# Patient Record
Sex: FEMALE | Race: WHITE | NOT HISPANIC OR LATINO | Employment: OTHER | URBAN - METROPOLITAN AREA
[De-identification: names, ages, dates, MRNs, and addresses within clinical notes are randomized per-mention and may not be internally consistent; named-entity substitution may affect disease eponyms.]

---

## 2017-11-29 ENCOUNTER — HOSPITAL ENCOUNTER (EMERGENCY)
Facility: HOSPITAL | Age: 42
Discharge: HOME | End: 2017-11-29
Attending: EMERGENCY MEDICINE
Payer: MEDICARE

## 2017-11-29 VITALS
SYSTOLIC BLOOD PRESSURE: 139 MMHG | HEART RATE: 95 BPM | HEIGHT: 68 IN | DIASTOLIC BLOOD PRESSURE: 60 MMHG | TEMPERATURE: 99 F | RESPIRATION RATE: 16 BRPM | OXYGEN SATURATION: 100 % | WEIGHT: 151 LBS | BODY MASS INDEX: 22.88 KG/M2

## 2017-11-29 DIAGNOSIS — N83.202 LEFT OVARIAN CYST: Primary | ICD-10-CM

## 2017-11-29 DIAGNOSIS — J06.9 VIRAL URI: ICD-10-CM

## 2017-11-29 LAB
BILIRUB UR QL STRIP: NEGATIVE
CLARITY UR: CLEAR
COLOR UR: YELLOW
GLUCOSE UR QL STRIP: NEGATIVE
HGB UR QL STRIP: NEGATIVE
KETONES UR QL STRIP: NEGATIVE
LEUKOCYTE ESTERASE UR QL STRIP: NEGATIVE
NITRITE UR QL STRIP: NEGATIVE
PH UR STRIP: 6 [PH] (ref 5–8)
PROT UR QL STRIP: NEGATIVE
SP GR UR STRIP: <=1.005 (ref 1–1.03)
URN SPEC COLLECT METH UR: ABNORMAL
UROBILINOGEN UR STRIP-ACNC: NEGATIVE EU/DL

## 2017-11-29 PROCEDURE — 99284 EMERGENCY DEPT VISIT MOD MDM: CPT | Mod: 25

## 2017-11-29 PROCEDURE — 81003 URINALYSIS AUTO W/O SCOPE: CPT

## 2017-11-29 PROCEDURE — 96372 THER/PROPH/DIAG INJ SC/IM: CPT

## 2017-11-29 PROCEDURE — 63600175 PHARM REV CODE 636 W HCPCS: Performed by: EMERGENCY MEDICINE

## 2017-11-29 RX ORDER — BENZONATATE 100 MG/1
100 CAPSULE ORAL 3 TIMES DAILY PRN
Qty: 20 CAPSULE | Refills: 0 | Status: SHIPPED | OUTPATIENT
Start: 2017-11-29 | End: 2017-12-09

## 2017-11-29 RX ORDER — KETOROLAC TROMETHAMINE 30 MG/ML
10 INJECTION, SOLUTION INTRAMUSCULAR; INTRAVENOUS
Status: COMPLETED | OUTPATIENT
Start: 2017-11-29 | End: 2017-11-29

## 2017-11-29 RX ORDER — DICLOFENAC SODIUM 50 MG/1
50 TABLET, DELAYED RELEASE ORAL 3 TIMES DAILY PRN
Qty: 30 TABLET | Refills: 0 | Status: SHIPPED | OUTPATIENT
Start: 2017-11-29 | End: 2021-01-25

## 2017-11-29 RX ORDER — FLUTICASONE PROPIONATE 50 MCG
1 SPRAY, SUSPENSION (ML) NASAL 2 TIMES DAILY PRN
Qty: 15 G | Refills: 0 | Status: SHIPPED | OUTPATIENT
Start: 2017-11-29 | End: 2020-05-26

## 2017-11-29 RX ADMIN — KETOROLAC TROMETHAMINE 10 MG: 30 INJECTION, SOLUTION INTRAMUSCULAR at 05:11

## 2017-11-29 NOTE — ED PROVIDER NOTES
Encounter Date: 2017    SCRIBE #1 NOTE: I, Anita Minaya, am scribing for, and in the presence of, Dr. Teresa.       History     Chief Complaint   Patient presents with    Abdominal Pain     began yesterday    URI       2017 5:08 PM     Chief complaint: Abdominal pain      Linda Sullivan is a 42 y.o. female with Behcet's syndrome, depression, and anxiety who presents to the ED with an onset of lower abdominal pain since yesterday with associated loose stools. She was seen at an urgent care [facility] where a negative UA was obtained and was referred to the ER for an US. The patient also endorses nasal congestion for a few days. She denies diarrhea, blood in stool, dysuria, hematuria, or any other symptoms at this time. The patient has a SHx including 3  sections and a hysterectomy.       The history is provided by the patient.     Review of patient's allergies indicates:   Allergen Reactions    Penicillins Hives     Past Medical History:   Diagnosis Date    Allergy     Anxiety     Behcet's     Depression     with psychosis    Migraine headache      Past Surgical History:   Procedure Laterality Date    SPINE SURGERY      cervical fusion C6 to cranium    TONSILLECTOMY, ADENOIDECTOMY      TOTAL ABDOMINAL HYSTERECTOMY      still has ovaries     Family History   Problem Relation Age of Onset    Hypertension Mother     Hyperlipidemia Mother     Cataracts Mother     Cancer Maternal Grandmother      bone    No Known Problems Father     No Known Problems Brother     Allergies Daughter     No Known Problems Son     No Known Problems Maternal Uncle     No Known Problems Paternal Aunt     No Known Problems Paternal Uncle     Amblyopia Neg Hx     Blindness Neg Hx     Diabetes Neg Hx     Glaucoma Neg Hx     Macular degeneration Neg Hx     Retinal detachment Neg Hx     Strabismus Neg Hx     Stroke Neg Hx     Thyroid disease Neg Hx      Social History   Substance Use Topics    Smoking  status: Current Every Day Smoker     Packs/day: 1.00     Years: 21.00     Types: Cigarettes     Start date: 1/27/2015    Smokeless tobacco: Never Used      Comment: started smoking again, 3 months    Alcohol use 16.8 oz/week     28 Cans of beer per week      Comment: 4 cans of beer a night     Review of Systems   Constitutional: Negative for chills and fever.   HENT: Positive for congestion (nasal). Negative for nosebleeds.    Eyes: Negative for visual disturbance.   Respiratory: Negative for cough and shortness of breath.    Cardiovascular: Negative for chest pain and palpitations.   Gastrointestinal: Positive for abdominal pain (lower). Negative for blood in stool, diarrhea, nausea and vomiting.        Positive for loose stools.    Genitourinary: Negative for dysuria and hematuria.   Musculoskeletal: Negative for back pain and neck pain.   Skin: Negative for rash.   Neurological: Negative for seizures, syncope and headaches.     Physical Exam     Initial Vitals [11/29/17 1655]   BP Pulse Resp Temp SpO2   139/60 95 16 98.6 °F (37 °C) 100 %      MAP       86.33         Physical Exam    Nursing note and vitals reviewed.  Constitutional: She appears well-developed and well-nourished. She is not diaphoretic. No distress.   HENT:   Head: Normocephalic and atraumatic.   Eyes: EOM are normal. Pupils are equal, round, and reactive to light.   Neck: Normal range of motion. Neck supple.   Cardiovascular: Normal rate, regular rhythm, normal heart sounds and intact distal pulses. Exam reveals no gallop and no friction rub.    No murmur heard.  Pulmonary/Chest: Breath sounds normal. No respiratory distress. She has no wheezes. She has no rhonchi. She has no rales.   Abdominal: Soft. Bowel sounds are normal. There is tenderness in the right lower quadrant and left lower quadrant. There is no rebound and no guarding.   Musculoskeletal: Normal range of motion.   Neurological: She is alert and oriented to person, place, and time.    Skin: Skin is warm and dry.   Psychiatric: She has a normal mood and affect. Her behavior is normal. Judgment and thought content normal.       ED Course   Procedures  Labs Reviewed   URINALYSIS      Imaging Results          US Pelvis Comp with Transvag NON-OB (xpd (Final result)  Result time 11/29/17 17:55:48    Final result by Basil Thompson MD (11/29/17 17:55:48)                 Impression:      1.  Approximately 5 cm hemorrhagic left ovarian cyst. 6-12 week followup ultrasound surveillance is recommended in this patient with pelvic pain.  2. Hysterectomy.      Electronically signed by: Basil Thompson MD  Date:     11/29/17  Time:    17:55              Narrative:    Comparison: None    Technique: Transverse and longitudinal gray scale ultrasound images of the pelvis with color and spectral Doppler analysis.    Findings: Evidence of prior hysterectomy is noted. The right ovary measures approximately 2.7 x 1.6 x 2.2 cm. It demonstrates normal Doppler flow.    The left ovary is enlarged, measuring approximately 4.4 x 4.5 x 5.2 cm. It contains an approximately 5 cm hypoechoic nonvascular mass consistent with a hemorrhagic cyst. Normal flow to the surrounding left ovarian tissue is identified.    No abnormal free pelvic fluid is identified.                                  Medical Decision Making:   History:   Old Medical Records: I decided to obtain old medical records.  Initial Assessment:   42-year-old female presents with a chief complaint of  pelvic pain and upper respiratory symptoms.  Differential Diagnosis:   Initial differential diagnosis included but not limited to ovarian torsion, ovarian cysts, UTI, and URI.  Clinical Tests:   Lab Tests: Reviewed and Ordered  Radiological Study: Reviewed and Ordered  ED Management:  The patient was emergently evaluated in the emergency Department, her evaluation was significant for a young female with mild pelvic tenderness.  The patient's ultrasound does show a  left ovarian cyst, that is likely the etiology of her pain.  The patient's pain was treated with a dose of parenteral Toradol.  The patient had a negative urinalysis done at the urgent care center prior to coming to the emergency Department tonight.  The patient is stable for discharge to home.  The patient will be discharged home with by mouth diclofenac to take as needed for pain and is to follow-up with her GYN physician.  Additionally the patient will be discharged home with Flonase and by mouth Tessalon Perles to take for her month-long URI.  There is no need for antibiotics for this.  She is to follow-up with her PCP for this.            Scribe Attestation:   Scribe #1: I performed the above scribed service and the documentation accurately describes the services I performed. I attest to the accuracy of the note.    I, Dr. Kunal Teresa, personally performed the services described in this documentation. All medical record entries made by the scribe were at my direction and in my presence.  I have reviewed the chart and agree that the record reflects my personal performance and is accurate and complete. Kunal Teresa MD.  6:50 PM 11/29/2017          ED Course      Clinical Impression:     1. Left ovarian cyst    2. Viral URI                                 Kunal Teresa MD  11/29/17 [id number]

## 2018-01-11 ENCOUNTER — OFFICE VISIT (OUTPATIENT)
Dept: OBSTETRICS AND GYNECOLOGY | Facility: CLINIC | Age: 43
End: 2018-01-11
Payer: MEDICARE

## 2018-01-11 VITALS
HEART RATE: 97 BPM | BODY MASS INDEX: 22.63 KG/M2 | HEIGHT: 69 IN | SYSTOLIC BLOOD PRESSURE: 121 MMHG | DIASTOLIC BLOOD PRESSURE: 81 MMHG | WEIGHT: 152.75 LBS

## 2018-01-11 DIAGNOSIS — Z01.419 ENCOUNTER FOR WELL WOMAN EXAM WITH ROUTINE GYNECOLOGICAL EXAM: Primary | ICD-10-CM

## 2018-01-11 DIAGNOSIS — N60.12 FIBROCYSTIC BREAST CHANGES OF BOTH BREASTS: ICD-10-CM

## 2018-01-11 DIAGNOSIS — Z01.419 GYNECOLOGIC EXAM NORMAL: ICD-10-CM

## 2018-01-11 DIAGNOSIS — N60.11 FIBROCYSTIC BREAST CHANGES OF BOTH BREASTS: ICD-10-CM

## 2018-01-11 DIAGNOSIS — R35.0 FREQUENT URINATION: ICD-10-CM

## 2018-01-11 DIAGNOSIS — Z12.31 VISIT FOR SCREENING MAMMOGRAM: ICD-10-CM

## 2018-01-11 LAB
BILIRUB SERPL-MCNC: NEGATIVE MG/DL
BLOOD URINE, POC: NEGATIVE
COLOR, POC UA: YELLOW
GLUCOSE UR QL STRIP: NORMAL
KETONES UR QL STRIP: NEGATIVE
LEUKOCYTE ESTERASE URINE, POC: NEGATIVE
NITRITE, POC UA: NEGATIVE
PH, POC UA: 5
PROTEIN, POC: NEGATIVE
SPECIFIC GRAVITY, POC UA: 1.02
UROBILINOGEN, POC UA: NORMAL

## 2018-01-11 PROCEDURE — 81001 URINALYSIS AUTO W/SCOPE: CPT | Mod: PBBFAC,PO | Performed by: OBSTETRICS & GYNECOLOGY

## 2018-01-11 PROCEDURE — 99999 PR PBB SHADOW E&M-EST. PATIENT-LVL IV: CPT | Mod: PBBFAC,,, | Performed by: OBSTETRICS & GYNECOLOGY

## 2018-01-11 PROCEDURE — 88142 CYTOPATH C/V THIN LAYER: CPT | Performed by: PATHOLOGY

## 2018-01-11 PROCEDURE — 87086 URINE CULTURE/COLONY COUNT: CPT

## 2018-01-11 PROCEDURE — 88141 CYTOPATH C/V INTERPRET: CPT | Mod: ,,, | Performed by: PATHOLOGY

## 2018-01-11 PROCEDURE — 99214 OFFICE O/P EST MOD 30 MIN: CPT | Mod: PBBFAC,PO | Performed by: OBSTETRICS & GYNECOLOGY

## 2018-01-11 PROCEDURE — 87624 HPV HI-RISK TYP POOLED RSLT: CPT

## 2018-01-11 PROCEDURE — G0101 CA SCREEN;PELVIC/BREAST EXAM: HCPCS | Mod: S$PBB,,, | Performed by: OBSTETRICS & GYNECOLOGY

## 2018-01-11 NOTE — PROGRESS NOTES
Subjective:       Patient ID: Linda Sullivan is a 42 y.o. female.    Chief Complaint:  lump on left breast and cyst on left ovary      History of Present Illness  HPI  Annual Exam-Premenopausal  Patient presents for annual exam. The patient has no complaints today other than urinary frequency.GYN screening history: no prior history of gyn screening tests. The patient wears seatbelts: yes. The patient participates in regular exercise: not asked. Has the patient ever been transfused or tattooed?: not asked. The patient reports that there is not domestic violence in her life.    GYN & OB History  No LMP recorded. Patient has had a hysterectomy.   Date of Last Pap: No result found    OB History    Para Term  AB Living   4 3 3   1     SAB TAB Ectopic Multiple Live Births   1              # Outcome Date GA Lbr Saturnino/2nd Weight Sex Delivery Anes PTL Lv   4 SAB            3 Term      CS-LTranv      2 Term      CS-LTranv      1 Term      CS-LTranv         Obstetric Comments   Menarche age 12. LMP age  35 (postsurgical).   No history of abnormal PAP smear.       Review of Systems  Review of Systems   Constitutional: Negative for activity change, appetite change, chills, diaphoresis, fatigue, fever and unexpected weight change.   HENT: Negative for mouth sores and tinnitus.    Eyes: Negative for discharge and visual disturbance.   Respiratory: Negative for cough, shortness of breath and wheezing.    Cardiovascular: Negative for chest pain, palpitations and leg swelling.   Gastrointestinal: Negative for abdominal pain, bloating, blood in stool, constipation, diarrhea, nausea and vomiting.   Endocrine: Negative for diabetes, hair loss, hot flashes, hyperthyroidism and hypothyroidism.   Genitourinary: Positive for frequency. Negative for decreased libido, dyspareunia, dysuria, flank pain, genital sores, hematuria, menorrhagia, menstrual problem, pelvic pain, urgency, vaginal bleeding, vaginal discharge, vaginal  pain, dysmenorrhea, urinary incontinence, postcoital bleeding, postmenopausal bleeding and vaginal odor.   Musculoskeletal: Negative for back pain, joint swelling and myalgias.   Skin:  Negative for rash, no acne and hair changes.   Neurological: Negative for seizures, syncope, numbness and headaches.   Hematological: Negative for adenopathy. Does not bruise/bleed easily.   Psychiatric/Behavioral: Positive for depression. Negative for sleep disturbance. The patient is not nervous/anxious.    Breast: Negative for breast mass, breast pain, nipple discharge and skin changes          Objective:    Physical Exam:   Constitutional: She is oriented to person, place, and time. She appears well-developed and well-nourished. No distress.    HENT:   Head: Normocephalic.    Eyes: Pupils are equal, round, and reactive to light.    Neck: Normal range of motion.    Cardiovascular: Normal rate.     Pulmonary/Chest: Effort normal.   Breasts are multinodular bilaterally. No nipple discharge noted        Abdominal: Soft. She exhibits no distension.     Genitourinary: Vagina normal. No vaginal discharge found.   Genitourinary Comments: Pap smear done of vaginal cuff. No palpable pelvic masses or tenderness           Musculoskeletal: Normal range of motion.       Neurological: She is alert and oriented to person, place, and time.    Skin: Skin is warm and dry.    Psychiatric: She has a normal mood and affect. Her behavior is normal. Judgment and thought content normal.          Assessment:        1. Encounter for well woman exam with routine gynecological exam    2. Frequent urination    3. Gynecologic exam normal    4. Visit for screening mammogram    5. Fibrocystic breast changes of both breasts                Plan:      Screening mammogram  Urine C&S

## 2018-01-13 LAB — BACTERIA UR CULT: NO GROWTH

## 2018-01-15 ENCOUNTER — HOSPITAL ENCOUNTER (OUTPATIENT)
Dept: RADIOLOGY | Facility: HOSPITAL | Age: 43
Discharge: HOME | End: 2018-01-15
Attending: OBSTETRICS & GYNECOLOGY
Payer: MEDICARE

## 2018-01-15 DIAGNOSIS — Z12.31 VISIT FOR SCREENING MAMMOGRAM: ICD-10-CM

## 2018-01-15 PROCEDURE — 77063 BREAST TOMOSYNTHESIS BI: CPT | Mod: 26,,, | Performed by: OBSTETRICS & GYNECOLOGY

## 2018-01-15 PROCEDURE — 77067 SCR MAMMO BI INCL CAD: CPT | Mod: TC

## 2018-01-15 PROCEDURE — 77067 SCR MAMMO BI INCL CAD: CPT | Mod: 26,,, | Performed by: OBSTETRICS & GYNECOLOGY

## 2018-01-19 LAB
HPV16 AG SPEC QL: NEGATIVE
HPV16+18+H RISK 12 DNA CVX-IMP: NEGATIVE
HPV18 DNA SPEC QL NAA+PROBE: NEGATIVE

## 2020-04-15 ENCOUNTER — OFFICE VISIT (OUTPATIENT)
Dept: FAMILY MEDICINE | Facility: CLINIC | Age: 45
End: 2020-04-15
Payer: COMMERCIAL

## 2020-04-15 VITALS
HEIGHT: 68 IN | DIASTOLIC BLOOD PRESSURE: 82 MMHG | SYSTOLIC BLOOD PRESSURE: 122 MMHG | BODY MASS INDEX: 21.98 KG/M2 | OXYGEN SATURATION: 98 % | TEMPERATURE: 99 F | WEIGHT: 145 LBS | HEART RATE: 112 BPM

## 2020-04-15 DIAGNOSIS — M35.2 BEHCET'S DISEASE: ICD-10-CM

## 2020-04-15 DIAGNOSIS — M54.2 CHRONIC NECK PAIN: ICD-10-CM

## 2020-04-15 DIAGNOSIS — R53.83 FATIGUE, UNSPECIFIED TYPE: ICD-10-CM

## 2020-04-15 DIAGNOSIS — Z98.1 S/P CERVICAL SPINAL FUSION: ICD-10-CM

## 2020-04-15 DIAGNOSIS — G43.511 INTRACTABLE PERSISTENT MIGRAINE AURA WITHOUT CEREBRAL INFARCTION AND WITH STATUS MIGRAINOSUS: Primary | ICD-10-CM

## 2020-04-15 DIAGNOSIS — R00.0 TACHYCARDIA: ICD-10-CM

## 2020-04-15 DIAGNOSIS — G89.29 CHRONIC NECK PAIN: ICD-10-CM

## 2020-04-15 DIAGNOSIS — Z12.31 SCREENING MAMMOGRAM, ENCOUNTER FOR: ICD-10-CM

## 2020-04-15 DIAGNOSIS — Z91.018 MULTIPLE FOOD ALLERGIES: ICD-10-CM

## 2020-04-15 DIAGNOSIS — Z00.00 WELLNESS EXAMINATION: ICD-10-CM

## 2020-04-15 PROCEDURE — 99205 PR OFFICE/OUTPT VISIT, NEW, LEVL V, 60-74 MIN: ICD-10-PCS | Mod: S$GLB,,, | Performed by: NURSE PRACTITIONER

## 2020-04-15 PROCEDURE — 99205 OFFICE O/P NEW HI 60 MIN: CPT | Mod: S$GLB,,, | Performed by: NURSE PRACTITIONER

## 2020-04-15 RX ORDER — RIZATRIPTAN BENZOATE 10 MG/1
10 TABLET ORAL
Qty: 9 TABLET | Refills: 1 | Status: SHIPPED | OUTPATIENT
Start: 2020-04-15 | End: 2021-01-25

## 2020-04-15 RX ORDER — TOPIRAMATE 25 MG/1
25 TABLET ORAL 2 TIMES DAILY
Qty: 60 TABLET | Refills: 2 | Status: SHIPPED | OUTPATIENT
Start: 2020-04-15 | End: 2021-01-25 | Stop reason: ALTCHOICE

## 2020-04-15 NOTE — PATIENT INSTRUCTIONS
Eating Heart-Healthy Foods  Eating has a big impact on your heart health. In fact, eating healthier can improve several of your heart risks at once. For instance, it helps you manage weight, cholesterol, and blood pressure. Here are ideas to help you make heart-healthy changes without giving up all the foods and flavors you love.  Getting started  · Talk with your health care provider about eating plans, such as the DASH or Mediterranean diet. You may also be referred to a dietitian.  · Change a few things at a time. Give yourself time to get used to a few eating changes before adding more.  · Work to create a tasty, healthy eating plan that you can stick to for the rest of your life.    Goals for healthy eating  Below are some tips to improve your eating habits:  · Limit saturated fats and trans fats. Saturated fats raise your levels of cholesterol, so keep these fats to a minimum. They are found in foods such as fatty meats, whole milk, cheese, and palm and coconut oils. Avoid trans fats because they lower good cholesterol as well as raise bad cholesterol. Trans fats are most often found in processed foods.  · Reduce sodium (salt) intake. Eating too much salt may increase your blood pressure. Limit your sodium intake to 2,300 milligrams (mg) per day, or less if your health care provider recommends it. Dining out less often and eating fewer processed foods are two great ways to decrease the amount of salt you consume.  · Managing calories. A calorie is a unit of energy. Your body burns calories for fuel, but if you eat more calories than your body burns, the extras are stored as fat. Your health care provider can help you create a diet plan to manage your calories. This will likely include eating healthier foods as well as exercising regularly. To help you track your progress, keep a diary to record what you eat and how often you exercise.  Choose the right foods  Aim to make these foods staples of your diet. If  you have diabetes, you may have different recommendations than what is listed here:  · Fruits and vegetable provide plenty of nutrients without a lot of calories. At meals, fill half your plate with these foods. Split the other half of your plate between whole grains and lean protein.  · Whole grains are high in fiber and rich in vitamins and nutrients. Good choices include whole-wheat bread, pasta, and brown rice.  · Lean proteins give you nutrition with less fat. Good choices include fish, skinless chicken, and beans.  · Low-fat or nonfat dairy provides nutrients without a lot of fat. Try low-fat or nonfat milk, cheese, or yogurt.  · Healthy fats can be good for you in small amounts. These are unsaturated fats, such as olive oil, nuts, and fish. Try to have at least 2 servings per week of fatty fish such as salmon, sardines, mackerel, rainbow trout, and albacore tuna. These contain omega-3 fatty acids, which are good for your heart. Flaxseed is another source of a heart-healthy fat.  More on heart healthy eating    Read food labels  Healthy eating starts at the grocery store. Be sure to pay attention to food labels on packaged foods. Look for products that are high in fiber and protein, and low in saturated fat, cholesterol, and sodium. Avoid products that contain trans fat. And pay close attention to serving size. For instance, if you plan to eat two servings, double all the numbers on the label.  Prepare food right  A key part of healthy cooking is cutting down on added fat and salt. Look on the internet for lower-fat, lower-sodium recipes. Also, try these tips:  · Remove fat from meat and skin from poultry before cooking.  · Skim fat from the surface of soups and sauces.  · Broil, boil, bake, steam, grill, and microwave food without added fats.  · Choose ingredients that spice up your food without adding calories, fat, or sodium. Try these items: horseradish, hot sauce, lemon, mustard, nonfat salad dressings,  and vinegar. For salt-free herbs and spices, try basil, cilantro, cinnamon, pepper, and rosemary.  Date Last Reviewed: 6/25/2015 © 2000-2017 [employer]. [address] [place] [address], [place] [address]. All rights reserved. This information is not intended as a substitute for professional medical care. Always follow your healthcare professional's instructions.        Understanding Tachycardia    The heart has an electrical system that sends signals to control the heartbeat. Any abnormal change in the speed or pattern of the heartbeat is called an arrhythmia. If you have an arrhythmia that causes the heart to beat faster than normal, this is known as tachycardia. There are many types of tachycardia. They can affect the hearts upper chambers (atria), the hearts lower chambers (ventricles), or both.  What causes tachycardia?  Many things can cause tachycardia, including:  · Damage to heart tissue from heart disease, past heart attack, or heart surgery  · Abnormal electrical pathways in the heart  · Problems with the hearts structure that you are born with   · High blood pressure  · Overactive thyroid  · Use of certain medicines  · Severe blood loss or anemia  · Dehydration  · Severe stress, fear, or anxiety  · Smoking  · Too much alcohol or caffeine  · Abuse of certain street drugs, such as cocaine  · Infections  · Certain inflammatory conditions  · Chronic  pain syndromes  What are the symptoms of tachycardia?  Tachycardia can cause a fast, pounding, or irregular heartbeat. It can also make it harder for the heart to pump blood efficiently to the body. This may cause symptoms such as:  · Shortness of breath  · Tiredness  · Dizziness or fainting  · Chest pain  Some people with tachycardia may have no symptoms at all.  How is tachycardiatreated?  Treatment for tachycardia depends on the cause. It also depends on the type you have and how severe your symptoms are. Tachycardia in the ventricles is often more  serious than in the atria. For this reason, it may need to be treated right away. Possible treatments include:  · Treating the underlying cause. For instance, if a medicine is causing your tachycardia, changing the dosage or stopping the medicine with your doctors guidance may correct the problem.  · Lifestyle changes. These include getting enough sleep and reducing stress. They also include avoiding caffeine, alcohol, tobacco, and street drugs.  · Vagal maneuvers.These are techniques that may help interrupt a fast heartbeat and slow it down. One example is to take a deep breath and bear down hard while holding your breath.   · Medicines. These may be used to help slow down a fast heartbeat. They may also be used to regulate the pattern of the heartbeat.  · Electrical cardioversion. Special pads or paddles are used to send one or more brief  electrical shocks to the heart. This can help restore the heartbeat to normal.  · Ablation. A long thin tube called a catheter is inserted into a blood vessel and threaded to the heart. The catheter sends out hot or cold energy to the areas causing abnormal signals. This destroys the problem tissue or cells. This may stop certain types of tachycardia.  · Pacemaker. This is a device that is placed just under the skin in the chest. It sends paced signals to make the heart beat at a more normal rate and rhythm. You may need this when certain medicines that treat tachycardia also result in a slow heart rate.    · Implantable cardioverter defibrillator (ICD). This is a device that is placed just under the skin in the chest or armpit. The ICD monitors your heart rate. When needed, it sends controlled burst of signals to the heart to overdrive a tachycardia.  It can also send a single stronger shock to the heart to stop a life-threatening type of tachycardia, if needed.  · Surgery. During surgery, different techniques may be used to create scar tissue in the areas of the heart causing  abnormal signals. This may help stop certain types of tachycardia.  What are the complications of tachycardia?  These can include:  · Blood clots or stroke  · Heart failure. With this problem, the heart muscle is so weak it no longer pumps blood well.  · Sudden cardiac arrest. This is when the heart suddenly stops beating.  When should I call my healthcare provider?  Call your healthcare provider right away if you have any of these:  · Symptoms that dont get better with treatment, or get worse  · New symptoms   Date Last Reviewed: 5/1/2016 © 2000-2017 [employer]. [address] [place] [id number]. All rights reserved. This information is not intended as a substitute for professional medical care. Always follow your healthcare professional's instructions.        Preventing Migraine Headaches: Medicines and Lifestyle Changes     Going to bed and getting up at the same time each day, including weekends, may help prevent migraines.     A migraine is a type of severe headache. Having a migraine can be very painful. But there are steps you can take to help prevent migraines.  Medicines to help prevent migraines  · Your healthcare provider may prescribe certain medicines to help prevent migraines. These medicines may need to be taken daily. Or they may only need to be taken at times when youre likely to have a migraine.  · Common medicines used to help prevent migraines include:  ¨ Triptans (serotonin receptor agonists)  ¨ Nonsteroidal anti-inflammatory drugs (available over-the-counter)  ¨ Beta-blockers  ¨ Anticonvulsants  ¨ Tricyclic antidepressants  ¨ Calcium channel blockers  ¨ Certain vitamins, minerals, and plant extracts  ¨ Botulinum toxin injection (Botox) for certain chronic migraines   ¨ CGRP (calcitonin gene-related peptide) agnonists are being reviewed by the Food and Drug Administration (FDA)  Lifestyle changes for long-term prevention  Here are some suggestions:  · Exercise. Regular  exercise can help prevent migraines and improve your health. (If exercise triggers your migraines, talk to your healthcare provider.)  · Keep regular habits. Dont skip or delay meals. Drink plenty of water. And go to bed and get up at about the same time each day. This includes weekends.  · Try alternative treatments. These are treatments that do not involve the use of medicines or surgery. They may help relieve symptoms and prevent migraines. Some treatment options include biofeedback and acupuncture. Ask your healthcare provider to tell you more about these treatments if you have questions.  · Limit caffeine. You may find that caffeine helps relieve pain during an attack. But too much caffeine can also trigger migraines. So, limit the amount of caffeine you consume.  Date Last Reviewed: 10/11/2015  © 2220-8610 [employer]. [address] [place] [address], [place] [address]. All rights reserved. This information is not intended as a substitute for professional medical care. Always follow your healthcare professional's instructions.

## 2020-04-15 NOTE — PROGRESS NOTES
SUBJECTIVE:    Patient ID: Linda Sullivan is a 45 y.o. female.    Chief Complaint: Establish Care; Migraine; and Sinus Problem    Ms. Sullivan is a 45-year-old lady here to establish as a new patient.  Her main complaint today is fatigue which has been persistent over the last few months.  No recent blood work.  No recent vision exam.    She has history significant for Behcet's syndrome, migraine headache, environmental allergies, depression and anxiety with history of psychosis.  She reports 4 prior neuro surgeries including cervical fusion and with prior diagnosis of basilar invagination.  She reports her migraines were previously managed with Triptan as abortive agent, Topamax which she believes was 300 mg q.day, morphine p.o. and some other medications that she cannot remember.  She was previously being seen by Neurology, Dr. Bravo.    She is requesting referral to allergist as she has been told previously that she may require allergy injections.  Today she has complained of clear rhinorrhea and scratchy throat, denies cough.  Eyes are itchy and watery.  She is taking OTC loratadine.    Denies chest pain, palpitations or dyspnea.  Denies dyspepsia, hemoptysis or melena.      Past Medical History:   Diagnosis Date    Allergy     Anxiety     Behcet's     Depression     with psychosis    Migraine headache      Past Surgical History:   Procedure Laterality Date     SECTION      SPINE SURGERY      cervical fusion C6 to cranium    TONSILLECTOMY, ADENOIDECTOMY      TOTAL ABDOMINAL HYSTERECTOMY      still has ovaries     Family History   Problem Relation Age of Onset    Hypertension Mother     Hyperlipidemia Mother     Cataracts Mother     Cancer Maternal Grandmother         bone    Cancer Father     No Known Problems Brother     Allergies Daughter     No Known Problems Son     No Known Problems Maternal Uncle     No Known Problems Paternal Aunt     No Known Problems Paternal Uncle   "   No Known Problems Daughter     Amblyopia Neg Hx     Blindness Neg Hx     Diabetes Neg Hx     Glaucoma Neg Hx     Macular degeneration Neg Hx     Retinal detachment Neg Hx     Strabismus Neg Hx     Stroke Neg Hx     Thyroid disease Neg Hx        Marital Status:   Alcohol History:  reports that she drank about 28.0 standard drinks of alcohol per week.  Tobacco History:  reports that she has been smoking cigarettes. She has a 8.25 pack-year smoking history. She has never used smokeless tobacco.  Drug History:  reports that she has current or past drug history. Drugs: Amphetamines, "Crack" cocaine, Cocaine, Codeine, Hydrocodone, Morphine, and Marijuana.    Review of patient's allergies indicates:   Allergen Reactions    Penicillins Hives    Banana Nausea Only    Potato Nausea Only    Tomato (solanum lycopersicum) Nausea Only       Current Outpatient Medications:     diclofenac (VOLTAREN) 50 MG EC tablet, Take 1 tablet (50 mg total) by mouth 3 (three) times daily as needed (pain). (Patient not taking: Reported on 4/15/2020), Disp: 30 tablet, Rfl: 0    fluticasone (FLONASE) 50 mcg/actuation nasal spray, 1 spray by Each Nare route 2 (two) times daily as needed for Rhinitis. (Patient not taking: Reported on 4/15/2020), Disp: 15 g, Rfl: 0    rizatriptan (MAXALT) 10 MG tablet, Take 1 tablet (10 mg total) by mouth as needed for Migraine (may repeat dose in 2 hours if HA not relieved, max 2 tabs in 24h)., Disp: 9 tablet, Rfl: 1    topiramate (TOPAMAX) 25 MG tablet, Take 1 tablet (25 mg total) by mouth 2 (two) times daily., Disp: 60 tablet, Rfl: 2    Review of Systems   Constitutional: Positive for fatigue. Negative for fever.   HENT: Positive for postnasal drip (With scratchy throat) and rhinorrhea.    Eyes: Positive for photophobia and itching.   Respiratory: Positive for cough (dry x2mo). Negative for shortness of breath.    Gastrointestinal: Negative for diarrhea, nausea and vomiting. " "  Allergic/Immunologic: Positive for environmental allergies and food allergies.   Neurological: Positive for light-headedness and headaches.   All other systems reviewed and are negative.         Objective:      Vitals:    04/15/20 0934   BP: 122/82   Pulse: (!) 112   Temp: 99.2 °F (37.3 °C)   SpO2: 98%   Weight: 65.8 kg (145 lb)   Height: 5' 8" (1.727 m)     Body mass index is 22.05 kg/m².  Physical Exam   Constitutional: She is oriented to person, place, and time. She appears well-developed and well-nourished.   HENT:   Head: Normocephalic.   Eyes: Pupils are equal, round, and reactive to light. Conjunctivae and EOM are normal.   Neck: Normal range of motion. Neck supple. No thyromegaly present.   Cardiovascular: Regular rhythm and normal heart sounds. Tachycardia present.   88bpm   Pulmonary/Chest: Effort normal and breath sounds normal.   Abdominal: Soft. Bowel sounds are normal.   Musculoskeletal: Normal range of motion.   Neurological: She is alert and oriented to person, place, and time.   Skin: Skin is warm and dry. Capillary refill takes less than 2 seconds.   Psychiatric: She has a normal mood and affect.   Nursing note and vitals reviewed.        Assessment:       1. Intractable persistent migraine aura without cerebral infarction and with status migrainosus    2. Tachycardia    3. Fatigue, unspecified type    4. S/P cervical spinal fusion    5. Behcet's disease    6. Chronic neck pain    7. Wellness examination    8. Screening mammogram, encounter for    9. Multiple food allergies         Plan:       Intractable persistent migraine aura without cerebral infarction and with status migrainosus  -     Ambulatory referral/consult to Neurology; Future; Expected date: 04/22/2020  -     Comprehensive metabolic panel; Future; Expected date: 04/15/2020  -     TSH; Future; Expected date: 04/15/2020  -     T4, free; Future; Expected date: 04/15/2020  -     topiramate (TOPAMAX) 25 MG tablet; Take 1 tablet (25 mg " total) by mouth 2 (two) times daily.  Dispense: 60 tablet; Refill: 2    Tachycardia  Keep home log.  Bring to follow-up.    Fatigue, unspecified type  -     Ambulatory referral/consult to Neurology; Future; Expected date: 04/22/2020  -     CBC auto differential; Future; Expected date: 04/15/2020  -     TSH; Future; Expected date: 04/15/2020  -     Vitamin D; Future; Expected date: 04/15/2020  -     T4, free; Future; Expected date: 04/15/2020    S/P cervical spinal fusion  Historical.    Behcet's disease  Historical.-     Ambulatory referral/consult to Rheumatology; Future; Expected date: 04/22/2020    Chronic neck pain  -     Ambulatory referral/consult to Neurology; Future; Expected date: 04/22/2020    Wellness examination  -     CBC auto differential; Future; Expected date: 04/15/2020  -     Comprehensive metabolic panel; Future; Expected date: 04/15/2020  -     Lipid panel; Future; Expected date: 04/15/2020  -     TSH; Future; Expected date: 04/15/2020  -     Vitamin D; Future; Expected date: 04/15/2020  -     T4, free; Future; Expected date: 04/15/2020  -     HIV 1/2 Ag/Ab (4th Gen); Future; Expected date: 04/15/2020    Screening mammogram, encounter for  -     Mammo Digital Screening Bilat without CA; Future    Multiple food allergies  -     Ambulatory referral/consult to Allergy; Future; Expected date: 04/22/2020    Other orders  -     rizatriptan (MAXALT) 10 MG tablet; Take 1 tablet (10 mg total) by mouth as needed for Migraine (may repeat dose in 2 hours if HA not relieved, max 2 tabs in 24h).  Dispense: 9 tablet; Refill: 1     I asked her to keep a blood pressure log to include her heart rate and to bring to her follow-up point appointment.    Follow up in about 3 months (around 7/15/2020), or if symptoms worsen or fail to improve.

## 2020-05-26 ENCOUNTER — OFFICE VISIT (OUTPATIENT)
Dept: ALLERGY | Facility: CLINIC | Age: 45
End: 2020-05-26
Payer: COMMERCIAL

## 2020-05-26 VITALS
HEART RATE: 89 BPM | SYSTOLIC BLOOD PRESSURE: 120 MMHG | BODY MASS INDEX: 21.98 KG/M2 | WEIGHT: 145 LBS | DIASTOLIC BLOOD PRESSURE: 80 MMHG | HEIGHT: 68 IN | OXYGEN SATURATION: 98 %

## 2020-05-26 DIAGNOSIS — J31.0 CHRONIC RHINITIS: ICD-10-CM

## 2020-05-26 DIAGNOSIS — K90.49 FOOD INTOLERANCE: Primary | ICD-10-CM

## 2020-05-26 PROCEDURE — 99204 OFFICE O/P NEW MOD 45 MIN: CPT | Mod: S$GLB,,, | Performed by: ALLERGY & IMMUNOLOGY

## 2020-05-26 PROCEDURE — 99204 PR OFFICE/OUTPT VISIT, NEW, LEVL IV, 45-59 MIN: ICD-10-PCS | Mod: S$GLB,,, | Performed by: ALLERGY & IMMUNOLOGY

## 2020-05-26 RX ORDER — MOMETASONE FUROATE 50 UG/1
2 SPRAY, METERED NASAL DAILY
Qty: 17 G | Refills: 3 | Status: SHIPPED | OUTPATIENT
Start: 2020-05-26 | End: 2021-01-25

## 2020-05-26 RX ORDER — AZELASTINE 1 MG/ML
1 SPRAY, METERED NASAL 2 TIMES DAILY
Qty: 30 ML | Refills: 2 | Status: SHIPPED | OUTPATIENT
Start: 2020-05-26 | End: 2021-01-25

## 2020-05-26 NOTE — LETTER
May 26, 2020      Gabriela Reynolds NP  [address]  [address]  [place] [address]           [facility] - Allergy  [address]  [address]  [place] [address]  Phone: [phone]  Fax: [phone]          Patient: Linda Sullivan   MR Number: [id number]   YOB: 1975   Date of Visit: 5/26/2020       Dear Gabriela Reynolds:    Thank you for referring Linda Sullivan to me for evaluation. Attached you will find relevant portions of my assessment and plan of care.    If you have questions, please do not hesitate to call me. I look forward to following Linda Sullivan along with you.    Sincerely,    Joan Gama MD    Enclosure  CC:  No Recipients    If you would like to receive this communication electronically, please contact [email or web address] or [phone] to request more information on [employer] Link access.    For providers and/or their staff who would like to refer a patient to [facility], please contact us through our one-stop-shop provider referral line, [facility] , at [phone].    If you feel you have received this communication in error or would no longer like to receive these types of communications, please e-mail [email or web address]

## 2020-05-26 NOTE — PATIENT INSTRUCTIONS
Nose:  Saline first 1-2 times per day- arm and hammer simply saline   Next azelastine 1 spray per nare twice per day - if symptoms worsen, may use up to 4 times per day   Then rhinocort 1 spray per nare twice per day    Technique:  Head down  Aim up and out   Spray don't sniff    Food testing    Instructions For Skin Testing    Please HOLD all antihistamines (Benadryl, zyrtec, Claritin, loratidine, cetirizine, diphenhydramine, medications with pm in the name, Allegra, fexofenadine) 7 days prior to testing.     Please HOLD zantac, ranitidine, pepsid, famotidine 3 days prior to your testing.     Please HOLD azelastine, astelin, astepro, dymista three days prior to your skin testing    Please HOLD your Beta Blocker (ask the office if you are on one.) These medicines typically end in olol. HOLD the morning of skin testing.    Please HOLD clonidine the morning of skin testing.     Please HOLD any Tricyclic antidepressants 14 days prior to skin testing. Please consult your prescribing doctor prior to discontinuing this medication.     Please HOLD Seroquel 14 days prior to skin testing. Please consult your prescribing physician prior to stopping this medication.     After skin testing, you may resume taking your HELD medications.     You may CONTINUE Montelukast , Flonase, Fluticasone, Nasonex, or other intranasal steroid.       Follow up in 4-6 weeks sooner if needed.

## 2020-05-26 NOTE — PROGRESS NOTES
"Subjective:       Patient ID: Linda Sullivan is a 45 y.o. female.    Chief Complaint: Food Intolerance (referred by SIM Etienne for multiple food allergies.) and Allergic Rhinitis  (seasonal allergy, pt had testing done ~15 years ago. )    HPI     Pt presents as a consult from Gabriela Reynolds NP for food sensitivity.       Food sensitivity   Onset: childhood   Sx: nausea, headaches, "light real bright" photophobia and feels this makes her sick. - does have migraine medication. Sore throat. Diarrhea.   Foods: uncertain, reports food testing of banana and potato   Treatment: tylenol, cold medicine    Testing: 15 yrs ago     Rhinitis:  Onset: childhood   Sx: headache, eyes burning, pnd, congestion   Trigger: possible outside. Smells   Tx: flonase- nothing right now.   Testing: 15 years ago     Atopic Hx    Food allergy no ige mediated sx   Oral allergy ulcers - citrus   Asthma- history of asthma   Latex- tolerates  Eczema denies   Urticaria denies   Nsaid tolerance yes     Infection History  N/a     Pneumonia # in the past 12 months:  Sinus infection # in the past 12 months:  Otitis infection # in the past 12 months:     Hospitalized to clear infection     Overwhelming warts or molluscum:      Denies lpr sx.     Review of Systems    General: neg unexpected weight changes, fevers, chills, night sweats, malaise  HEENT: see hpi, Neg eye pain, vision changes, ear drainage, nose bleeds, throat tightness, sores in the mouth  CV: Neg chest pain, palpitations, swelling  Resp: see hpi, neg shortness of breath, hemoptysis, cough  GI: see hpi, neg dysphagia, reflux, chronic diarrhea, chronic constipation  Derm: See Hpi, neg new rash, neg flushing  Mu/sk: Neg joint pain, joint swelling   Psych: Neg anxiety  neuro: neg chronic headaches, muscle weakness  Endo: neg heat/cold intolerance, chronic fatigue    Objective:     Vitals:    05/26/20 1315   BP: 120/80   Pulse: 89   SpO2: 98%   Weight: 65.8 kg (145 lb)   Height: 5' 8" " (1.727 m)        Physical Exam    General: no acute distress, well developed well nourished   HEENT:   Head:normocephalic atraumatic  Eyes: CARISSA, EOMI, Neg injection, scleral icterus, or conjunctival papillary hypertrophy.  Ears: tm clear bilaterally, normal canal  Nose: 2-3+ inferior turbinates pink, neg nasal polyps            Mucosa: moist             Septal irritation: none   OP: mucus membranes moist, - cobblestoning, - PND, neg erythema or lesions  Neck: supple, Full range of motion, neg lymphadenopathy  Chest: full respiratory excursion no abnormal chest abnormality  Resp: clear to ascultation bilaterally  CV: RRR, neg MRG, brisk capillary refill  Abdomen: BS+, non tender, non distended  Ext:  Neg clubbing, cyanosis, pitting edema  Skin: Neg rashes or lesions  Lymph: neg supraclavicular, axillary     Assessment:       1. Food intolerance    2. Chronic rhinitis        Plan:       Food intolerance  -     Ambulatory referral/consult to Allergy    Chronic rhinitis  -     azelastine (ASTELIN) 137 mcg (0.1 %) nasal spray; 1 spray (137 mcg total) by Nasal route 2 (two) times daily.  Dispense: 30 mL; Refill: 2  -     mometasone (NASONEX) 50 mcg/actuation nasal spray; 2 sprays by Nasal route once daily.  Dispense: 17 g; Refill: 3      Food intolerance- nausea   Food panel on procedure day   Suspects tomato and cheese  Will need to be off zyrtec x 7 days.     Chronic rhinitis  Inhalant panel at same time as food panel   Start with saline and combination therapy     History of GI doctor seen in the past     History of migraine   Sees neurology on topomax   Most likely the reason for nausea and sensitivity to light.     Follow up in 6 weeks, sooner if needed.         Joan Gama M.D.  Allergy/Immunology  [facility]   [phone] phone  [phone] fax

## 2020-07-08 ENCOUNTER — CLINICAL SUPPORT (OUTPATIENT)
Dept: ALLERGY | Facility: CLINIC | Age: 45
End: 2020-07-08
Payer: COMMERCIAL

## 2020-07-08 VITALS — BODY MASS INDEX: 21.98 KG/M2 | WEIGHT: 145 LBS | TEMPERATURE: 98 F | HEIGHT: 68 IN

## 2020-07-08 DIAGNOSIS — J31.0 CHRONIC RHINITIS: ICD-10-CM

## 2020-07-08 DIAGNOSIS — K90.49 FOOD INTOLERANCE: ICD-10-CM

## 2020-07-08 PROCEDURE — 95004 PERQ TESTS W/ALRGNC XTRCS: CPT | Mod: S$GLB,,, | Performed by: ALLERGY & IMMUNOLOGY

## 2020-07-08 PROCEDURE — 95004 PR ALLERGY SKIN TESTS,ALLERGENS: ICD-10-PCS | Mod: S$GLB,,, | Performed by: ALLERGY & IMMUNOLOGY

## 2020-07-09 ENCOUNTER — HOSPITAL ENCOUNTER (OUTPATIENT)
Dept: RADIOLOGY | Facility: HOSPITAL | Age: 45
Discharge: HOME | End: 2020-07-09
Attending: NURSE PRACTITIONER
Payer: COMMERCIAL

## 2020-07-09 VITALS — HEIGHT: 68 IN | WEIGHT: 145.06 LBS | BODY MASS INDEX: 21.99 KG/M2

## 2020-07-09 DIAGNOSIS — Z12.31 SCREENING MAMMOGRAM, ENCOUNTER FOR: ICD-10-CM

## 2020-07-09 PROCEDURE — 77067 SCR MAMMO BI INCL CAD: CPT | Mod: TC,PO

## 2020-07-13 ENCOUNTER — TELEPHONE (OUTPATIENT)
Dept: FAMILY MEDICINE | Facility: CLINIC | Age: 45
End: 2020-07-13

## 2020-07-13 NOTE — TELEPHONE ENCOUNTER
----- Message from Gabriela Reynolds NP sent at 7/9/2020  1:43 PM CDT -----  Please contact the patient and let them know that her mammo was wnl and f/u for annual mammo.

## 2020-07-15 ENCOUNTER — TELEPHONE (OUTPATIENT)
Dept: FAMILY MEDICINE | Facility: CLINIC | Age: 45
End: 2020-07-15

## 2020-07-22 ENCOUNTER — TELEPHONE (OUTPATIENT)
Dept: FAMILY MEDICINE | Facility: CLINIC | Age: 45
End: 2020-07-22

## 2020-07-22 NOTE — TELEPHONE ENCOUNTER
----- Message from Julia Whitaker MA sent at 7/22/2020  4:13 PM CDT -----  Regarding: FW: DEXA ORDER REQUEST    ----- Message -----  From: Gabriela Reynolds NP  Sent: 7/22/2020  11:24 AM CDT  To: Julia Whitaker MA  Subject: RE: DEXA ORDER REQUEST                           Bone density test will not be covered at her age unless there is a specific indication.   ----- Message -----  From: Julia Whitaker MA  Sent: 7/13/2020  12:50 PM CDT  To: Gabriela Reynolds NP  Subject: FW: DEXA ORDER REQUEST                             ----- Message -----  From: Denise Barron  Sent: 7/9/2020  11:09 AM CDT  To: [facility] Staff  Subject: DEXA ORDER REQUEST                               PT IS REQUESTING A BONE DENSITY TEST , PT ST SHE HAS HISTORY OF NEUR SURGERIES,AND HIP INJURY, PLEASE CALL PT IF THERE ARE ANY QUESTIONS OR CONCERNS. THANKS IN ADVANCE

## 2020-07-27 ENCOUNTER — DOCUMENTATION ONLY (OUTPATIENT)
Dept: ALLERGY | Facility: CLINIC | Age: 45
End: 2020-07-27

## 2020-07-27 DIAGNOSIS — Z91.199 NO-SHOW FOR APPOINTMENT: Primary | ICD-10-CM

## 2020-08-19 ENCOUNTER — LAB VISIT (OUTPATIENT)
Dept: LAB | Facility: HOSPITAL | Age: 45
End: 2020-08-19
Attending: INTERNAL MEDICINE
Payer: COMMERCIAL

## 2020-08-19 ENCOUNTER — OFFICE VISIT (OUTPATIENT)
Dept: RHEUMATOLOGY | Facility: CLINIC | Age: 45
End: 2020-08-19
Payer: COMMERCIAL

## 2020-08-19 VITALS
BODY MASS INDEX: 23.13 KG/M2 | DIASTOLIC BLOOD PRESSURE: 65 MMHG | TEMPERATURE: 97 F | SYSTOLIC BLOOD PRESSURE: 105 MMHG | WEIGHT: 152.13 LBS

## 2020-08-19 DIAGNOSIS — M79.7 FIBROMYALGIA: ICD-10-CM

## 2020-08-19 DIAGNOSIS — M79.7 FIBROMYALGIA: Primary | ICD-10-CM

## 2020-08-19 DIAGNOSIS — R29.91 MARFANOID HABITUS: ICD-10-CM

## 2020-08-19 DIAGNOSIS — F32.9 MAJOR DEPRESSIVE DISORDER WITH CURRENT ACTIVE EPISODE, UNSPECIFIED DEPRESSION EPISODE SEVERITY, UNSPECIFIED WHETHER RECURRENT: ICD-10-CM

## 2020-08-19 DIAGNOSIS — M35.2 BEHCET'S DISEASE: ICD-10-CM

## 2020-08-19 LAB
CK SERPL-CCNC: 51 U/L (ref 20–180)
CRP SERPL-MCNC: <0.02 MG/DL

## 2020-08-19 PROCEDURE — 36415 COLL VENOUS BLD VENIPUNCTURE: CPT

## 2020-08-19 PROCEDURE — 99205 OFFICE O/P NEW HI 60 MIN: CPT | Mod: S$GLB,,, | Performed by: INTERNAL MEDICINE

## 2020-08-19 PROCEDURE — 86038 ANTINUCLEAR ANTIBODIES: CPT

## 2020-08-19 PROCEDURE — 86235 NUCLEAR ANTIGEN ANTIBODY: CPT

## 2020-08-19 PROCEDURE — 86140 C-REACTIVE PROTEIN: CPT

## 2020-08-19 PROCEDURE — 82550 ASSAY OF CK (CPK): CPT

## 2020-08-19 PROCEDURE — 99205 PR OFFICE/OUTPT VISIT, NEW, LEVL V, 60-74 MIN: ICD-10-PCS | Mod: S$GLB,,, | Performed by: INTERNAL MEDICINE

## 2020-08-19 PROCEDURE — 86200 CCP ANTIBODY: CPT

## 2020-08-19 NOTE — PROGRESS NOTES
[facility] RHEUMATOLOGY           New patient visit    Notes dictated to M*Modal. Please forgive any unintentional errors.  Subjective:       Patient ID:   NAME: Linda Sullivan : 1975     45 y.o. female    Referring Doc: Gabriela Reynolds NP  Other Physicians:    Chief Complaint:  Behcet's disease    HPI:  This patient was referred by Dr. Gama for evaluation and management of generalized musculoskeletal pain of a chronic nature.  Additionally, the patient was diagnosed with Behcet's disease many years ago though she has not been treated for it in at least the last 10 years  (patient claims the diagnosis was made in error).  She describes pain in her neck, shoulders, and lower back which is burning in quality and moderate to severe.  She does not describe any red, hot, and/or swollen joints.  She notes that she has had multiples interventions surgically for neck and back problems and that none of these have been successful.  She takes over-the-counter anti-inflammatories and uses diclofenac gel with limited benefit.  She has not been treated with steroids.    She has a past medical history of major depression/schizophrenia which had been treated 4 times on an inpatient basis but is now totally untreated.  She is not under the care of any mental health provider.    She also has a past medical history of atopy and migraines.    ROS:   GEN:      no fever, night sweats or weight loss.  SKIN:     no rashes, bruising, no Raynauds, no photosensitivity  HEENT:  no acute changes in vision, no mouth ulcers, no sicca symptoms, no scalp tenderness, jaw claudication.  CV:        no CP, PND, PATE or orthopnea, no palpitations  PULM:   no SOB, cough, hemoptysis, sputum or pleuritic pain  GI:          No dysphagia, GERD, hematemesis; no abdominal pain, nausea, vomiting, constipation, diarrhea, melanotic stools, BRBPR.  :        no hematuria, dysuria.  No vaginal lesions  NEURO: no paresthesias, + visual disturbances  "with migraine headaches  MUSCULOSKELETAL:  No red, hot, and/or swollen joints.  No muscle weakness   PSYCH: + insomnia, + anxiety-depression    Past Medical/Surgical History:  Past Medical History:   Diagnosis Date    Allergy     Anxiety     Behcet's     Depression     with psychosis    Migraine headache      Past Surgical History:   Procedure Laterality Date     SECTION      SPINE SURGERY      cervical fusion C6 to cranium    TONSILLECTOMY, ADENOIDECTOMY      TOTAL ABDOMINAL HYSTERECTOMY      still has ovaries       Allergies:  Review of patient's allergies indicates:   Allergen Reactions    Penicillins Hives    Banana Nausea Only    Potato Nausea Only    Tomato (solanum lycopersicum) Nausea Only       Social/Family History:  Social History     Socioeconomic History    Marital status:      Spouse name: Not on file    Number of children: 3    Years of education: Not on file    Highest education level: Not on file   Occupational History    Occupation: homemaker   Social Needs    Financial resource strain: Not on file    Food insecurity     Worry: Not on file     Inability: Not on file    Transportation needs     Medical: Not on file     Non-medical: Not on file   Tobacco Use    Smoking status: Current Some Day Smoker     Packs/day: 0.25     Years: 33.00     Pack years: 8.25     Types: Cigarettes    Smokeless tobacco: Never Used    Tobacco comment: started smoking again, 3 months   Substance and Sexual Activity    Alcohol use: Not Currently     Alcohol/week: 28.0 standard drinks     Types: 28 Cans of beer per week     Comment: now only occasionally, quit after 2012    Drug use: Not Currently     Types: Amphetamines, "Crack" cocaine, Cocaine, Codeine, Hydrocodone, Morphine, Marijuana     Comment: rehab, clean years ago     Sexual activity: Not Currently     Comment: No hx of STd.   Lifestyle    Physical activity     Days per week: Not on file     Minutes per session: " Not on file    Stress: Very much   Relationships    Social connections     Talks on phone: Not on file     Gets together: Not on file     Attends Jew service: Not on file     Active member of club or organization: Not on file     Attends meetings of clubs or organizations: Not on file     Relationship status: Not on file   Other Topics Concern    Not on file   Social History Narrative    The patient does not exercise regularly ().Rates diet as fair.She is not satisfied with weight.     Family History   Problem Relation Age of Onset    Hypertension Mother     Hyperlipidemia Mother     Cataracts Mother     Cancer Maternal Grandmother         bone    Cancer Father     No Known Problems Brother     Allergies Daughter     No Known Problems Son     No Known Problems Maternal Uncle     No Known Problems Paternal Aunt     No Known Problems Paternal Uncle     No Known Problems Daughter     Amblyopia Neg Hx     Blindness Neg Hx     Diabetes Neg Hx     Glaucoma Neg Hx     Macular degeneration Neg Hx     Retinal detachment Neg Hx     Strabismus Neg Hx     Stroke Neg Hx     Thyroid disease Neg Hx      FAMILY HISTORY: negative for Connective Tissue Disease  There are no end exam questions for this visit.    Medications:    Current Outpatient Medications:     acetaminophen (TYLENOL ORAL), Take by mouth., Disp: , Rfl:     CALCIUM ORAL, Take by mouth., Disp: , Rfl:     MAGNESIUM ORAL, Take by mouth., Disp: , Rfl:     ZINC ORAL, Take by mouth., Disp: , Rfl:     azelastine (ASTELIN) 137 mcg (0.1 %) nasal spray, 1 spray (137 mcg total) by Nasal route 2 (two) times daily. (Patient not taking: Reported on 8/19/2020), Disp: 30 mL, Rfl: 2    diclofenac (VOLTAREN) 50 MG EC tablet, Take 1 tablet (50 mg total) by mouth 3 (three) times daily as needed (pain). (Patient not taking: Reported on 8/19/2020), Disp: 30 tablet, Rfl: 0    mometasone (NASONEX) 50 mcg/actuation nasal spray, 2 sprays by Nasal route  once daily. (Patient not taking: Reported on 8/19/2020), Disp: 17 g, Rfl: 3    rizatriptan (MAXALT) 10 MG tablet, Take 1 tablet (10 mg total) by mouth as needed for Migraine (may repeat dose in 2 hours if HA not relieved, max 2 tabs in 24h). (Patient not taking: Reported on 8/19/2020), Disp: 9 tablet, Rfl: 1    topiramate (TOPAMAX) 25 MG tablet, Take 1 tablet (25 mg total) by mouth 2 (two) times daily. (Patient not taking: Reported on 8/19/2020), Disp: 60 tablet, Rfl: 2    Objective:     Vitals:  Blood pressure 105/65, temperature 97.4 °F (36.3 °C), weight 69 kg (152 lb 1.6 oz).    Physical Examination:   GEN:     Tall, long-limbed female in no apparent distress  SKIN:    no rashes, no sclerodactyly, no Raynaud's, no periungual erythema, no digital tip tapering, no nailbed pitting  HEAD:   no alopecia, no scalp tenderness, no temporal artery tenderness or induration.  EYES:   no conjunctival pallor, no icterus  ENT:     no thrush, no mucosal dryness or ulcerations, adequate oral hygiene & dentition.  NECK:   Decreased range of motion in 6 planes, no masses, no thyromegaly, no lymphadenopathy.  CV:        S1 and S2, RRR, II/VI MARIA VICTORIA @ LUSB, no gallop or rubs  CHEST: Normal respiratory effort;  normal breath sounds/no adventitious sounds. No signs of consolidation.  ABD:      non-tender and non-distended; soft; normal bowel sounds; no rebound, guarding, or tenderness. No hepatosplenomegaly.  Musculoskeletal:  No evidence of inflammatory arthritis of the small joints of the hands or feet.  No evidence of large joint inflammation, deformity, or laxity.  Muscle strength 5/5 x4.  No discrete muscular tenderness or atrophy noted.  Trigger points are reactive in 6 of 8 tested locations.  Range of motion of the lumbar spine is full.  Posture is normal.  Gait is normal  EXTREM: no clubbing, cyanosis or edema. normal pulses. Unique's - x2  NEURO:  grossly intact; motor/sensory WNL; no tremors  PSYCH:  Somewhat anxious,  pressured speech, oriented x3, insight impaired, not acutely psychotic, not suicidal nor homicidal    Labs:   Lab Results   Component Value Date    WBC 6.05 03/20/2014    HGB 14.0 03/20/2014    HCT 42.3 03/20/2014     (H) 03/20/2014     03/20/2014   CMP@  Sodium   Date Value Ref Range Status   03/20/2014 137 136 - 145 mmol/L Final     Potassium   Date Value Ref Range Status   03/20/2014 4.3 3.5 - 5.1 mmol/L Final     Chloride   Date Value Ref Range Status   03/20/2014 103 95 - 110 mmol/L Final     CO2   Date Value Ref Range Status   03/20/2014 23 23 - 29 mmol/L Final     Glucose   Date Value Ref Range Status   03/20/2014 98 70 - 110 mg/dL Final     BUN, Bld   Date Value Ref Range Status   03/20/2014 8 6 - 20 mg/dL Final     Creatinine   Date Value Ref Range Status   03/20/2014 0.8 0.5 - 1.4 mg/dL Final     Calcium   Date Value Ref Range Status   03/20/2014 9.2 8.7 - 10.5 mg/dL Final     Total Protein   Date Value Ref Range Status   03/20/2014 7.4 6.0 - 8.4 g/dL Final     Albumin   Date Value Ref Range Status   03/20/2014 3.9 3.5 - 5.2 g/dL Final     Total Bilirubin   Date Value Ref Range Status   03/20/2014 0.9 0.1 - 1.0 mg/dL Final     Comment:     For infants and newborns, interpretation of results should be based  on gestational age, weight and in agreement with clinical  observations.  Premature Infant recommended reference ranges:  Up to 24 hours.............<8.0 mg/dL  Up to 48 hours............<12.0 mg/dL  3-5 days..................<15.0 mg/dL  6-29 days.................<15.0 mg/dL     Alkaline Phosphatase   Date Value Ref Range Status   03/20/2014 54 (L) 55 - 135 U/L Final     AST   Date Value Ref Range Status   03/20/2014 49 (H) 10 - 40 U/L Final     ALT   Date Value Ref Range Status   03/20/2014 65 (H) 10 - 44 U/L Final     CRP   Date Value Ref Range Status   11/07/2013 0.6 0.0 - 8.2 mg/L Final     CRISTA   Date Value Ref Range Status   07/01/2013 Negative Neg <1:160 Final     Comment:     CRISTA  performed on HEP-2 substrate.     Rheumatoid Factor   Date Value Ref Range Status   07/01/2013 < 10.0 0 - 15 IU/ml Final       Radiology/Diagnostic Studies:    None    Assessment/Discussion/Plan:   45 y.o. female with a history of Behcet's disease diagnosed in the mid 1990s without any features at present that suggest activity  2) generalized musculoskeletal pain in a setting of chronic anxiety-depressive disorder, without evidence of inflammatory muscle or joint disease-consistent with fibromyalgia  3) MAJOR DEPRESSIVE DISORDER- untreated  4) MARFANOID HABITUS    PLAN:  I reviewed Behcet's disease with her. She answers all questions with a zay no.  She then mentions that that diagnosis was incorrect.    I then described fibromyalgia to her.  She states did that she had some familiarity with it.  I explained that it is generally caused by an abnormality in the sleep cycle which results in muscular exhaustion.  I told her that anxiety and depression are very common triggers of an interrupted sleep cycle.  I recommended that she reestablish care with a mental health provider so that her anxiety-depression can be dealt with in such a way that her myofascial pain symptoms will diminish.  She is not interested in such a referral at this time.    I have ordered appropriate blood testing to rule out any underlying inflammatory disease.  This included an CRISTA/SSA, RF/CCP, CPK, and acute phase reactants.    I recommend that she have an echocardiogram done through her cardiology NP.  I note that a previous echo was done about 8 years ago and was normal.  My concern is not only the mitral valve murmur heard on exam but the possibility of aortic root issues associated with what appears to be Marfan's.  Additionally, he should be referred to Ophthalmology for a complete examination including slit-lamp exam.    RTC:  I will see her back on an as-needed or re-referral basis.             Electronically signed by Jase THURSTON  MD Doni

## 2020-08-19 NOTE — LETTER
August 19, 2020      Gabriela Reynolds NP  [address]  [address]  [place] [address]           [facility] - Rheumatology  [address]  [address]  [place] [address]  Phone: [phone]  Fax: [phone]          Patient: Linda Sullivan   MR Number: [id number]   YOB: 1975   Date of Visit: 8/19/2020       Dear Gabriela Reynolds:    Thank you for referring Linda Sullivan to me for evaluation. Attached you will find relevant portions of my assessment and plan of care.    If you have questions, please do not hesitate to call me. I look forward to following Linda Sullivan along with you.    Sincerely,    Jase Marion MD    Enclosure  CC:  No Recipients    If you would like to receive this communication electronically, please contact [email or web address] or [phone] to request more information on [employer] Link access.    For providers and/or their staff who would like to refer a patient to [facility], please contact us through our one-stop-shop provider referral line, [facility], at [phone].    If you feel you have received this communication in error or would no longer like to receive these types of communications, please e-mail [email or web address]

## 2020-08-19 NOTE — PATIENT INSTRUCTIONS
Understanding Fibromyalgia     People with fibromyalgia tend to have at least 11 of the 18 tender points shown above.     Fibromyalgia is a condition that causes pain at specific points on the body, stiffness, and fatigue.  What are the symptoms of fibromyalgia?  In most cases, you will have tender points on your body. These points are very sore, especially when touched. Finding several of these points helps your healthcare provider diagnose fibromyalgia.  Along with the tender points, you may have some or all of the following symptoms:  · Constant tiredness (fatigue), even after a full nights sleep (non-restorative sleep)  · A burning or throbbing pain in many parts of the body (this pain may vary during the day)  · Stiffness or aching all over your body  · Numbness or tingling in your arms and legs  · Trouble sleeping  · Bowel problems (bloating, diarrhea, constipation)  · Headaches  · Depression  How is fibromyalgia diagnosed?  There is no lab test to diagnose fibromyalgia. Instead, your healthcare provider will take your health history and examine your joints and muscles. Certain criteria are used when diagnosing fibromyalgia. Symptoms need to be present for at least 3 months. Tests may be done to rule out other conditions with similar symptoms. Then, together you can design a plan to help you manage your symptoms.   How is fibromyalgia treated?  Several medications are approved to treat fibromyalgia. Two were originally made to treat depression. They are duloxetine, and milnacipran. A third, called pregaballin, was developed to treat nerve pain. Certain medicines used to treat depression have been helpful with fibromyalgia. Other medications include pain relievers such as acetaminophen or stronger narcotics. These may be prescribed short term.  NSAIDs (nonsteroidal anti-inflammatory drugs) including aspirin, ibuprofen, and naproxen are used to relieve pain.  Getting enough sleep, regular exercise, and eating  a healthy diet can help manage symptoms. Cognitive behavioral therapy (a form of psychotherapy) can be helpful for fibromyalgia. Some people find alternative treatments such as massage, chiropractic treatments, biofeedback, or acupuncture also help with symptoms.  Date Last Reviewed: 2/14/2016  © 5415-6419 [employer]. [address] [place] [address], [place] [address]. All rights reserved. This information is not intended as a substitute for professional medical care. Always follow your healthcare professional's instructions.

## 2020-08-20 ENCOUNTER — TELEPHONE (OUTPATIENT)
Dept: FAMILY MEDICINE | Facility: CLINIC | Age: 45
End: 2020-08-20

## 2020-08-20 LAB
ANA TITR SER IF: NEGATIVE {TITER}
ENA SS-A AB SER-ACNC: <0.2 AI (ref 0–0.9)

## 2020-08-20 NOTE — TELEPHONE ENCOUNTER
----- Message from Gabriela Reynolds NP sent at 8/19/2020  6:39 PM CDT -----  Regarding: RE: Echo needed  Please schedule her for an appointment next week.  ----- Message -----  From: Jase Marion MD  Sent: 8/19/2020  11:56 AM CDT  To: Gabriela Reynolds NP  Subject: Echo needed                                      Gabriela,    Please see the last paragraph of my note on this patient today.  I think she should have an echo done.  A referral to Ophthalmology would also be advised.    Thanks,    DIAZ Marion MD

## 2020-08-21 LAB — CCP IGA+IGG SERPL IA-ACNC: 4 UNITS (ref 0–19)

## 2020-09-14 ENCOUNTER — TELEPHONE (OUTPATIENT)
Dept: FAMILY MEDICINE | Facility: CLINIC | Age: 45
End: 2020-09-14

## 2020-09-14 NOTE — TELEPHONE ENCOUNTER
----- Message from Gabriela Reynolds NP sent at 9/14/2020 10:51 AM CDT -----  Please call her for a f/u appt.   ----- Message -----  From: Jase Marion MD  Sent: 8/19/2020  11:55 AM CDT  To: Gabriela Reynolds NP

## 2020-09-16 ENCOUNTER — TELEPHONE (OUTPATIENT)
Dept: FAMILY MEDICINE | Facility: CLINIC | Age: 45
End: 2020-09-16
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
## 2021-01-25 ENCOUNTER — OFFICE VISIT (OUTPATIENT)
Dept: FAMILY MEDICINE | Facility: CLINIC | Age: 46
End: 2021-01-25
Payer: MEDICARE

## 2021-01-25 VITALS
DIASTOLIC BLOOD PRESSURE: 82 MMHG | BODY MASS INDEX: 20.89 KG/M2 | HEIGHT: 68 IN | SYSTOLIC BLOOD PRESSURE: 144 MMHG | WEIGHT: 137.81 LBS | TEMPERATURE: 98 F | OXYGEN SATURATION: 99 % | HEART RATE: 89 BPM

## 2021-01-25 DIAGNOSIS — Z00.00 PERIODIC HEALTH ASSESSMENT, GENERAL SCREENING, ADULT: ICD-10-CM

## 2021-01-25 DIAGNOSIS — R89.9 ABNORMAL LABORATORY TEST RESULT: ICD-10-CM

## 2021-01-25 DIAGNOSIS — J31.0 CHRONIC RHINITIS: ICD-10-CM

## 2021-01-25 DIAGNOSIS — M62.838 CERVICAL PARASPINAL MUSCLE SPASM: ICD-10-CM

## 2021-01-25 DIAGNOSIS — R53.83 FATIGUE, UNSPECIFIED TYPE: ICD-10-CM

## 2021-01-25 DIAGNOSIS — G43.111 INTRACTABLE MIGRAINE WITH AURA WITH STATUS MIGRAINOSUS: Primary | ICD-10-CM

## 2021-01-25 DIAGNOSIS — F32.0 CURRENT MILD EPISODE OF MAJOR DEPRESSIVE DISORDER WITHOUT PRIOR EPISODE: ICD-10-CM

## 2021-01-25 PROCEDURE — 99204 OFFICE O/P NEW MOD 45 MIN: CPT | Mod: PBBFAC,PO | Performed by: PHYSICIAN ASSISTANT

## 2021-01-25 PROCEDURE — 99204 PR OFFICE/OUTPT VISIT, NEW, LEVL IV, 45-59 MIN: ICD-10-PCS | Mod: S$GLB,,, | Performed by: PHYSICIAN ASSISTANT

## 2021-01-25 PROCEDURE — 99999 PR PBB SHADOW E&M-NEW PATIENT-LVL IV: CPT | Mod: PBBFAC,,, | Performed by: PHYSICIAN ASSISTANT

## 2021-01-25 PROCEDURE — 99999 PR PBB SHADOW E&M-NEW PATIENT-LVL IV: ICD-10-PCS | Mod: PBBFAC,,, | Performed by: PHYSICIAN ASSISTANT

## 2021-01-25 PROCEDURE — 99204 OFFICE O/P NEW MOD 45 MIN: CPT | Mod: S$GLB,,, | Performed by: PHYSICIAN ASSISTANT

## 2021-01-25 RX ORDER — SUMATRIPTAN SUCCINATE 100 MG/1
TABLET ORAL
Qty: 18 TABLET | Refills: 11 | Status: SHIPPED | OUTPATIENT
Start: 2021-01-25 | End: 2021-06-23 | Stop reason: ALTCHOICE

## 2021-01-25 RX ORDER — TOPIRAMATE 100 MG/1
100 TABLET, FILM COATED ORAL 2 TIMES DAILY
Qty: 60 TABLET | Refills: 11 | Status: SHIPPED | OUTPATIENT
Start: 2021-01-25 | End: 2021-12-30

## 2021-01-25 RX ORDER — CITALOPRAM 10 MG/1
10 TABLET ORAL DAILY
Qty: 30 TABLET | Refills: 11 | Status: SHIPPED | OUTPATIENT
Start: 2021-01-25 | End: 2021-06-23

## 2021-01-25 RX ORDER — ONDANSETRON 4 MG/1
4 TABLET, ORALLY DISINTEGRATING ORAL EVERY 8 HOURS PRN
Qty: 20 TABLET | Refills: 1 | Status: SHIPPED | OUTPATIENT
Start: 2021-01-25 | End: 2021-03-21

## 2021-02-01 ENCOUNTER — TELEPHONE (OUTPATIENT)
Dept: FAMILY MEDICINE | Facility: CLINIC | Age: 46
End: 2021-02-01

## 2021-02-02 ENCOUNTER — TELEPHONE (OUTPATIENT)
Dept: FAMILY MEDICINE | Facility: CLINIC | Age: 46
End: 2021-02-02

## 2021-02-09 ENCOUNTER — TELEPHONE (OUTPATIENT)
Dept: FAMILY MEDICINE | Facility: CLINIC | Age: 46
End: 2021-02-09

## 2021-02-11 ENCOUNTER — LAB VISIT (OUTPATIENT)
Dept: LAB | Facility: HOSPITAL | Age: 46
End: 2021-02-11
Attending: PHYSICIAN ASSISTANT
Payer: MEDICARE

## 2021-02-11 ENCOUNTER — TELEPHONE (OUTPATIENT)
Dept: FAMILY MEDICINE | Facility: CLINIC | Age: 46
End: 2021-02-11

## 2021-02-11 DIAGNOSIS — Z00.00 PERIODIC HEALTH ASSESSMENT, GENERAL SCREENING, ADULT: ICD-10-CM

## 2021-02-11 DIAGNOSIS — F32.0 CURRENT MILD EPISODE OF MAJOR DEPRESSIVE DISORDER WITHOUT PRIOR EPISODE: ICD-10-CM

## 2021-02-11 DIAGNOSIS — G43.111 INTRACTABLE MIGRAINE WITH AURA WITH STATUS MIGRAINOSUS: ICD-10-CM

## 2021-02-11 DIAGNOSIS — J31.0 CHRONIC RHINITIS: ICD-10-CM

## 2021-02-11 DIAGNOSIS — R53.83 FATIGUE, UNSPECIFIED TYPE: ICD-10-CM

## 2021-02-11 LAB
ALBUMIN SERPL BCP-MCNC: 4.2 G/DL (ref 3.5–5.2)
ALP SERPL-CCNC: 35 U/L (ref 55–135)
ALT SERPL W/O P-5'-P-CCNC: 43 U/L (ref 10–44)
ANION GAP SERPL CALC-SCNC: 6 MMOL/L (ref 8–16)
AST SERPL-CCNC: 19 U/L (ref 10–40)
BASOPHILS # BLD AUTO: 0.03 K/UL (ref 0–0.2)
BASOPHILS NFR BLD: 0.8 % (ref 0–1.9)
BILIRUB SERPL-MCNC: 1.1 MG/DL (ref 0.1–1)
BUN SERPL-MCNC: 4 MG/DL (ref 6–20)
CALCIUM SERPL-MCNC: 8.5 MG/DL (ref 8.7–10.5)
CHLORIDE SERPL-SCNC: 110 MMOL/L (ref 95–110)
CO2 SERPL-SCNC: 24 MMOL/L (ref 23–29)
CREAT SERPL-MCNC: 0.8 MG/DL (ref 0.5–1.4)
DIFFERENTIAL METHOD: ABNORMAL
EOSINOPHIL # BLD AUTO: 0 K/UL (ref 0–0.5)
EOSINOPHIL NFR BLD: 0.5 % (ref 0–8)
ERYTHROCYTE [DISTWIDTH] IN BLOOD BY AUTOMATED COUNT: 12.1 % (ref 11.5–14.5)
EST. GFR  (AFRICAN AMERICAN): >60 ML/MIN/1.73 M^2
EST. GFR  (NON AFRICAN AMERICAN): >60 ML/MIN/1.73 M^2
GLUCOSE SERPL-MCNC: 93 MG/DL (ref 70–110)
HCT VFR BLD AUTO: 38.2 % (ref 37–48.5)
HGB BLD-MCNC: 12.4 G/DL (ref 12–16)
IMM GRANULOCYTES # BLD AUTO: 0 K/UL (ref 0–0.04)
IMM GRANULOCYTES NFR BLD AUTO: 0 % (ref 0–0.5)
LYMPHOCYTES # BLD AUTO: 1.2 K/UL (ref 1–4.8)
LYMPHOCYTES NFR BLD: 32.4 % (ref 18–48)
MCH RBC QN AUTO: 31.6 PG (ref 27–31)
MCHC RBC AUTO-ENTMCNC: 32.5 G/DL (ref 32–36)
MCV RBC AUTO: 97 FL (ref 82–98)
MONOCYTES # BLD AUTO: 0.4 K/UL (ref 0.3–1)
MONOCYTES NFR BLD: 10.7 % (ref 4–15)
NEUTROPHILS # BLD AUTO: 2.1 K/UL (ref 1.8–7.7)
NEUTROPHILS NFR BLD: 55.6 % (ref 38–73)
NRBC BLD-RTO: 0 /100 WBC
PLATELET # BLD AUTO: 232 K/UL (ref 150–350)
PMV BLD AUTO: 11.5 FL (ref 9.2–12.9)
POTASSIUM SERPL-SCNC: 4.7 MMOL/L (ref 3.5–5.1)
PROT SERPL-MCNC: 6.6 G/DL (ref 6–8.4)
RBC # BLD AUTO: 3.93 M/UL (ref 4–5.4)
SODIUM SERPL-SCNC: 140 MMOL/L (ref 136–145)
TSH SERPL DL<=0.005 MIU/L-ACNC: 1.63 UIU/ML (ref 0.4–4)
WBC # BLD AUTO: 3.73 K/UL (ref 3.9–12.7)

## 2021-02-11 PROCEDURE — 85025 COMPLETE CBC W/AUTO DIFF WBC: CPT

## 2021-02-11 PROCEDURE — 84443 ASSAY THYROID STIM HORMONE: CPT

## 2021-02-11 PROCEDURE — 80053 COMPREHEN METABOLIC PANEL: CPT

## 2021-02-11 PROCEDURE — 36415 COLL VENOUS BLD VENIPUNCTURE: CPT | Mod: PO

## 2021-02-12 ENCOUNTER — LAB VISIT (OUTPATIENT)
Dept: LAB | Facility: HOSPITAL | Age: 46
End: 2021-02-12
Attending: PHYSICIAN ASSISTANT
Payer: MEDICARE

## 2021-02-12 DIAGNOSIS — R53.83 FATIGUE, UNSPECIFIED TYPE: ICD-10-CM

## 2021-02-12 DIAGNOSIS — J31.0 CHRONIC RHINITIS: ICD-10-CM

## 2021-02-12 DIAGNOSIS — F32.0 CURRENT MILD EPISODE OF MAJOR DEPRESSIVE DISORDER WITHOUT PRIOR EPISODE: ICD-10-CM

## 2021-02-12 DIAGNOSIS — G43.111 INTRACTABLE MIGRAINE WITH AURA WITH STATUS MIGRAINOSUS: ICD-10-CM

## 2021-02-12 DIAGNOSIS — Z00.00 PERIODIC HEALTH ASSESSMENT, GENERAL SCREENING, ADULT: ICD-10-CM

## 2021-02-12 PROCEDURE — 81003 URINALYSIS AUTO W/O SCOPE: CPT

## 2021-02-13 LAB
BILIRUB UR QL STRIP: NEGATIVE
CLARITY UR REFRACT.AUTO: CLEAR
COLOR UR AUTO: NORMAL
GLUCOSE UR QL STRIP: NEGATIVE
HGB UR QL STRIP: NEGATIVE
KETONES UR QL STRIP: NEGATIVE
LEUKOCYTE ESTERASE UR QL STRIP: NEGATIVE
NITRITE UR QL STRIP: NEGATIVE
PH UR STRIP: 7 [PH] (ref 5–8)
PROT UR QL STRIP: NEGATIVE
SP GR UR STRIP: 1 (ref 1–1.03)
URN SPEC COLLECT METH UR: NORMAL

## 2021-02-18 ENCOUNTER — TELEPHONE (OUTPATIENT)
Dept: FAMILY MEDICINE | Facility: CLINIC | Age: 46
End: 2021-02-18

## 2021-02-19 ENCOUNTER — TELEPHONE (OUTPATIENT)
Dept: FAMILY MEDICINE | Facility: CLINIC | Age: 46
End: 2021-02-19

## 2021-02-19 ENCOUNTER — TELEPHONE (OUTPATIENT)
Dept: OBSTETRICS AND GYNECOLOGY | Facility: CLINIC | Age: 46
End: 2021-02-19

## 2021-02-24 ENCOUNTER — TELEPHONE (OUTPATIENT)
Dept: FAMILY MEDICINE | Facility: CLINIC | Age: 46
End: 2021-02-24

## 2021-03-02 ENCOUNTER — TELEPHONE (OUTPATIENT)
Dept: FAMILY MEDICINE | Facility: CLINIC | Age: 46
End: 2021-03-02

## 2021-04-14 ENCOUNTER — TELEPHONE (OUTPATIENT)
Dept: NEUROLOGY | Facility: CLINIC | Age: 46
End: 2021-04-14

## 2021-06-23 ENCOUNTER — OFFICE VISIT (OUTPATIENT)
Dept: FAMILY MEDICINE | Facility: CLINIC | Age: 46
End: 2021-06-23
Payer: MEDICARE

## 2021-06-23 VITALS
BODY MASS INDEX: 20.16 KG/M2 | HEART RATE: 66 BPM | SYSTOLIC BLOOD PRESSURE: 105 MMHG | TEMPERATURE: 98 F | HEIGHT: 68 IN | DIASTOLIC BLOOD PRESSURE: 60 MMHG | WEIGHT: 133 LBS

## 2021-06-23 DIAGNOSIS — B96.89 BACTERIAL SINUSITIS: ICD-10-CM

## 2021-06-23 DIAGNOSIS — J32.9 BACTERIAL SINUSITIS: ICD-10-CM

## 2021-06-23 DIAGNOSIS — J31.0 CHRONIC RHINITIS: ICD-10-CM

## 2021-06-23 DIAGNOSIS — F32.0 CURRENT MILD EPISODE OF MAJOR DEPRESSIVE DISORDER WITHOUT PRIOR EPISODE: ICD-10-CM

## 2021-06-23 DIAGNOSIS — G43.111 INTRACTABLE MIGRAINE WITH AURA WITH STATUS MIGRAINOSUS: Primary | ICD-10-CM

## 2021-06-23 DIAGNOSIS — K21.9 GASTROESOPHAGEAL REFLUX DISEASE, UNSPECIFIED WHETHER ESOPHAGITIS PRESENT: ICD-10-CM

## 2021-06-23 DIAGNOSIS — Z01.419 WOMEN'S ANNUAL ROUTINE GYNECOLOGICAL EXAMINATION: ICD-10-CM

## 2021-06-23 PROCEDURE — 99999 PR PBB SHADOW E&M-EST. PATIENT-LVL V: ICD-10-PCS | Mod: PBBFAC,,, | Performed by: INTERNAL MEDICINE

## 2021-06-23 PROCEDURE — 99204 PR OFFICE/OUTPT VISIT, NEW, LEVL IV, 45-59 MIN: ICD-10-PCS | Mod: S$GLB,,, | Performed by: INTERNAL MEDICINE

## 2021-06-23 PROCEDURE — 1126F PR PAIN SEVERITY QUANTIFIED, NO PAIN PRESENT: ICD-10-PCS | Mod: S$GLB,,, | Performed by: INTERNAL MEDICINE

## 2021-06-23 PROCEDURE — 99204 OFFICE O/P NEW MOD 45 MIN: CPT | Mod: S$GLB,,, | Performed by: INTERNAL MEDICINE

## 2021-06-23 PROCEDURE — 1126F AMNT PAIN NOTED NONE PRSNT: CPT | Mod: S$GLB,,, | Performed by: INTERNAL MEDICINE

## 2021-06-23 PROCEDURE — 99999 PR PBB SHADOW E&M-EST. PATIENT-LVL V: CPT | Mod: PBBFAC,,, | Performed by: INTERNAL MEDICINE

## 2021-06-23 PROCEDURE — 3008F BODY MASS INDEX DOCD: CPT | Mod: CPTII,S$GLB,, | Performed by: INTERNAL MEDICINE

## 2021-06-23 PROCEDURE — 3008F PR BODY MASS INDEX (BMI) DOCUMENTED: ICD-10-PCS | Mod: CPTII,S$GLB,, | Performed by: INTERNAL MEDICINE

## 2021-06-23 RX ORDER — RIZATRIPTAN BENZOATE 10 MG/1
10 TABLET ORAL
Qty: 9 TABLET | Refills: 5 | Status: SHIPPED | OUTPATIENT
Start: 2021-06-23 | End: 2022-05-04

## 2021-06-23 RX ORDER — DOXYCYCLINE 100 MG/1
100 CAPSULE ORAL 2 TIMES DAILY
Qty: 14 CAPSULE | Refills: 0 | Status: SHIPPED | OUTPATIENT
Start: 2021-06-23 | End: 2021-06-30

## 2021-06-23 RX ORDER — OMEPRAZOLE 20 MG/1
20 CAPSULE, DELAYED RELEASE ORAL DAILY
COMMUNITY
End: 2022-05-04

## 2021-06-23 RX ORDER — AZELASTINE 1 MG/ML
1 SPRAY, METERED NASAL 2 TIMES DAILY
Qty: 30 ML | Refills: 0 | Status: SHIPPED | OUTPATIENT
Start: 2021-06-23 | End: 2021-10-01

## 2021-06-23 RX ORDER — CITALOPRAM 20 MG/1
20 TABLET, FILM COATED ORAL DAILY
Qty: 30 TABLET | Refills: 11 | Status: SHIPPED | OUTPATIENT
Start: 2021-06-23 | End: 2021-10-01

## 2021-06-23 RX ORDER — PHENYLEPHRINE HCL 10 MG/1
10 TABLET, FILM COATED ORAL EVERY 4 HOURS PRN
COMMUNITY

## 2021-06-23 RX ORDER — MINERAL OIL
180 ENEMA (ML) RECTAL DAILY
COMMUNITY

## 2021-06-23 RX ORDER — FOLIC ACID 0.8 MG
800 TABLET ORAL DAILY
COMMUNITY

## 2021-06-23 RX ORDER — FLUTICASONE PROPIONATE 50 MCG
SPRAY, SUSPENSION (ML) NASAL
Qty: 16 G | Refills: 12 | Status: SHIPPED | OUTPATIENT
Start: 2021-06-23 | End: 2021-10-01

## 2021-08-31 ENCOUNTER — PATIENT OUTREACH (OUTPATIENT)
Dept: ADMINISTRATIVE | Facility: OTHER | Age: 46
End: 2021-08-31

## 2021-10-01 ENCOUNTER — TELEPHONE (OUTPATIENT)
Dept: OBSTETRICS AND GYNECOLOGY | Facility: CLINIC | Age: 46
End: 2021-10-01

## 2021-10-01 ENCOUNTER — OFFICE VISIT (OUTPATIENT)
Dept: OBSTETRICS AND GYNECOLOGY | Facility: CLINIC | Age: 46
End: 2021-10-01
Payer: MEDICARE

## 2021-10-01 VITALS
BODY MASS INDEX: 20.11 KG/M2 | HEIGHT: 68 IN | DIASTOLIC BLOOD PRESSURE: 88 MMHG | SYSTOLIC BLOOD PRESSURE: 122 MMHG | WEIGHT: 132.69 LBS

## 2021-10-01 DIAGNOSIS — Z12.31 BREAST CANCER SCREENING BY MAMMOGRAM: Primary | ICD-10-CM

## 2021-10-01 DIAGNOSIS — R10.2 PELVIC PAIN: Primary | ICD-10-CM

## 2021-10-01 PROCEDURE — 1160F RVW MEDS BY RX/DR IN RCRD: CPT | Mod: CPTII,S$GLB,, | Performed by: NURSE PRACTITIONER

## 2021-10-01 PROCEDURE — 3079F DIAST BP 80-89 MM HG: CPT | Mod: CPTII,S$GLB,, | Performed by: NURSE PRACTITIONER

## 2021-10-01 PROCEDURE — 99999 PR PBB SHADOW E&M-EST. PATIENT-LVL IV: ICD-10-PCS | Mod: PBBFAC,,, | Performed by: NURSE PRACTITIONER

## 2021-10-01 PROCEDURE — 3074F PR MOST RECENT SYSTOLIC BLOOD PRESSURE < 130 MM HG: ICD-10-PCS | Mod: CPTII,S$GLB,, | Performed by: NURSE PRACTITIONER

## 2021-10-01 PROCEDURE — 3008F PR BODY MASS INDEX (BMI) DOCUMENTED: ICD-10-PCS | Mod: CPTII,S$GLB,, | Performed by: NURSE PRACTITIONER

## 2021-10-01 PROCEDURE — 1159F PR MEDICATION LIST DOCUMENTED IN MEDICAL RECORD: ICD-10-PCS | Mod: CPTII,S$GLB,, | Performed by: NURSE PRACTITIONER

## 2021-10-01 PROCEDURE — 1160F PR REVIEW ALL MEDS BY PRESCRIBER/CLIN PHARMACIST DOCUMENTED: ICD-10-PCS | Mod: CPTII,S$GLB,, | Performed by: NURSE PRACTITIONER

## 2021-10-01 PROCEDURE — 3079F PR MOST RECENT DIASTOLIC BLOOD PRESSURE 80-89 MM HG: ICD-10-PCS | Mod: CPTII,S$GLB,, | Performed by: NURSE PRACTITIONER

## 2021-10-01 PROCEDURE — 3008F BODY MASS INDEX DOCD: CPT | Mod: CPTII,S$GLB,, | Performed by: NURSE PRACTITIONER

## 2021-10-01 PROCEDURE — 3074F SYST BP LT 130 MM HG: CPT | Mod: CPTII,S$GLB,, | Performed by: NURSE PRACTITIONER

## 2021-10-01 PROCEDURE — 99202 OFFICE O/P NEW SF 15 MIN: CPT | Mod: S$GLB,,, | Performed by: NURSE PRACTITIONER

## 2021-10-01 PROCEDURE — 99202 PR OFFICE/OUTPT VISIT, NEW, LEVL II, 15-29 MIN: ICD-10-PCS | Mod: S$GLB,,, | Performed by: NURSE PRACTITIONER

## 2021-10-01 PROCEDURE — 99999 PR PBB SHADOW E&M-EST. PATIENT-LVL IV: CPT | Mod: PBBFAC,,, | Performed by: NURSE PRACTITIONER

## 2021-10-01 PROCEDURE — 1159F MED LIST DOCD IN RCRD: CPT | Mod: CPTII,S$GLB,, | Performed by: NURSE PRACTITIONER

## 2021-10-18 ENCOUNTER — HOSPITAL ENCOUNTER (OUTPATIENT)
Dept: RADIOLOGY | Facility: HOSPITAL | Age: 46
Discharge: HOME | End: 2021-10-18
Attending: NURSE PRACTITIONER
Payer: MEDICARE

## 2021-10-18 DIAGNOSIS — Z12.31 BREAST CANCER SCREENING BY MAMMOGRAM: ICD-10-CM

## 2021-10-18 DIAGNOSIS — R10.2 PELVIC PAIN: ICD-10-CM

## 2021-10-18 PROCEDURE — 77063 BREAST TOMOSYNTHESIS BI: CPT | Mod: 26,,, | Performed by: RADIOLOGY

## 2021-10-18 PROCEDURE — 76830 US PELVIS COMP WITH TRANSVAG NON-OB (XPD): ICD-10-PCS | Mod: 26,,, | Performed by: RADIOLOGY

## 2021-10-18 PROCEDURE — 77067 MAMMO DIGITAL SCREENING BILAT WITH TOMO: ICD-10-PCS | Mod: 26,,, | Performed by: RADIOLOGY

## 2021-10-18 PROCEDURE — 76856 US PELVIS COMP WITH TRANSVAG NON-OB (XPD): ICD-10-PCS | Mod: 26,,, | Performed by: RADIOLOGY

## 2021-10-18 PROCEDURE — 77067 SCR MAMMO BI INCL CAD: CPT | Mod: 26,,, | Performed by: RADIOLOGY

## 2021-10-18 PROCEDURE — 77063 MAMMO DIGITAL SCREENING BILAT WITH TOMO: ICD-10-PCS | Mod: 26,,, | Performed by: RADIOLOGY

## 2021-10-18 PROCEDURE — 76856 US EXAM PELVIC COMPLETE: CPT | Mod: 26,,, | Performed by: RADIOLOGY

## 2021-10-18 PROCEDURE — 76830 TRANSVAGINAL US NON-OB: CPT | Mod: 26,,, | Performed by: RADIOLOGY

## 2021-10-18 PROCEDURE — 77067 SCR MAMMO BI INCL CAD: CPT | Mod: TC

## 2021-10-18 PROCEDURE — 76856 US EXAM PELVIC COMPLETE: CPT | Mod: TC

## 2021-10-19 ENCOUNTER — TELEPHONE (OUTPATIENT)
Dept: OBSTETRICS AND GYNECOLOGY | Facility: CLINIC | Age: 46
End: 2021-10-19

## 2021-12-30 ENCOUNTER — TELEPHONE (OUTPATIENT)
Dept: FAMILY MEDICINE | Facility: CLINIC | Age: 46
End: 2021-12-30
Payer: MEDICARE

## 2021-12-30 ENCOUNTER — HOSPITAL ENCOUNTER (OUTPATIENT)
Dept: RADIOLOGY | Facility: HOSPITAL | Age: 46
Discharge: HOME | End: 2021-12-30
Attending: STUDENT IN AN ORGANIZED HEALTH CARE EDUCATION/TRAINING PROGRAM
Payer: MEDICARE

## 2021-12-30 ENCOUNTER — OFFICE VISIT (OUTPATIENT)
Dept: FAMILY MEDICINE | Facility: CLINIC | Age: 46
End: 2021-12-30
Payer: MEDICARE

## 2021-12-30 VITALS
SYSTOLIC BLOOD PRESSURE: 119 MMHG | WEIGHT: 138.69 LBS | HEART RATE: 73 BPM | DIASTOLIC BLOOD PRESSURE: 77 MMHG | BODY MASS INDEX: 21.02 KG/M2 | TEMPERATURE: 99 F | HEIGHT: 68 IN

## 2021-12-30 DIAGNOSIS — F32.A DEPRESSION, UNSPECIFIED DEPRESSION TYPE: ICD-10-CM

## 2021-12-30 DIAGNOSIS — Z98.1 S/P CERVICAL SPINAL FUSION: ICD-10-CM

## 2021-12-30 DIAGNOSIS — Z11.4 SCREENING FOR HIV (HUMAN IMMUNODEFICIENCY VIRUS): ICD-10-CM

## 2021-12-30 DIAGNOSIS — E16.2 HYPOGLYCEMIA: ICD-10-CM

## 2021-12-30 DIAGNOSIS — G43.111 INTRACTABLE MIGRAINE WITH AURA WITH STATUS MIGRAINOSUS: ICD-10-CM

## 2021-12-30 DIAGNOSIS — R11.0 NAUSEA: Primary | ICD-10-CM

## 2021-12-30 DIAGNOSIS — K42.9 UMBILICAL HERNIA WITHOUT OBSTRUCTION AND WITHOUT GANGRENE: ICD-10-CM

## 2021-12-30 PROCEDURE — 3044F HG A1C LEVEL LT 7.0%: CPT | Mod: CPTII,S$GLB,, | Performed by: STUDENT IN AN ORGANIZED HEALTH CARE EDUCATION/TRAINING PROGRAM

## 2021-12-30 PROCEDURE — 3078F PR MOST RECENT DIASTOLIC BLOOD PRESSURE < 80 MM HG: ICD-10-PCS | Mod: CPTII,S$GLB,, | Performed by: STUDENT IN AN ORGANIZED HEALTH CARE EDUCATION/TRAINING PROGRAM

## 2021-12-30 PROCEDURE — 72040 X-RAY EXAM NECK SPINE 2-3 VW: CPT | Mod: 26,,, | Performed by: RADIOLOGY

## 2021-12-30 PROCEDURE — 3074F SYST BP LT 130 MM HG: CPT | Mod: CPTII,S$GLB,, | Performed by: STUDENT IN AN ORGANIZED HEALTH CARE EDUCATION/TRAINING PROGRAM

## 2021-12-30 PROCEDURE — 3044F PR MOST RECENT HEMOGLOBIN A1C LEVEL <7.0%: ICD-10-PCS | Mod: CPTII,S$GLB,, | Performed by: STUDENT IN AN ORGANIZED HEALTH CARE EDUCATION/TRAINING PROGRAM

## 2021-12-30 PROCEDURE — 3008F BODY MASS INDEX DOCD: CPT | Mod: CPTII,S$GLB,, | Performed by: STUDENT IN AN ORGANIZED HEALTH CARE EDUCATION/TRAINING PROGRAM

## 2021-12-30 PROCEDURE — 1160F RVW MEDS BY RX/DR IN RCRD: CPT | Mod: CPTII,S$GLB,, | Performed by: STUDENT IN AN ORGANIZED HEALTH CARE EDUCATION/TRAINING PROGRAM

## 2021-12-30 PROCEDURE — 72040 X-RAY EXAM NECK SPINE 2-3 VW: CPT | Mod: TC,PO

## 2021-12-30 PROCEDURE — 3078F DIAST BP <80 MM HG: CPT | Mod: CPTII,S$GLB,, | Performed by: STUDENT IN AN ORGANIZED HEALTH CARE EDUCATION/TRAINING PROGRAM

## 2021-12-30 PROCEDURE — 3074F PR MOST RECENT SYSTOLIC BLOOD PRESSURE < 130 MM HG: ICD-10-PCS | Mod: CPTII,S$GLB,, | Performed by: STUDENT IN AN ORGANIZED HEALTH CARE EDUCATION/TRAINING PROGRAM

## 2021-12-30 PROCEDURE — 1160F PR REVIEW ALL MEDS BY PRESCRIBER/CLIN PHARMACIST DOCUMENTED: ICD-10-PCS | Mod: CPTII,S$GLB,, | Performed by: STUDENT IN AN ORGANIZED HEALTH CARE EDUCATION/TRAINING PROGRAM

## 2021-12-30 PROCEDURE — 3008F PR BODY MASS INDEX (BMI) DOCUMENTED: ICD-10-PCS | Mod: CPTII,S$GLB,, | Performed by: STUDENT IN AN ORGANIZED HEALTH CARE EDUCATION/TRAINING PROGRAM

## 2021-12-30 PROCEDURE — 99999 PR PBB SHADOW E&M-EST. PATIENT-LVL IV: CPT | Mod: PBBFAC,,, | Performed by: STUDENT IN AN ORGANIZED HEALTH CARE EDUCATION/TRAINING PROGRAM

## 2021-12-30 PROCEDURE — 99214 OFFICE O/P EST MOD 30 MIN: CPT | Mod: S$GLB,,, | Performed by: STUDENT IN AN ORGANIZED HEALTH CARE EDUCATION/TRAINING PROGRAM

## 2021-12-30 PROCEDURE — 1159F MED LIST DOCD IN RCRD: CPT | Mod: CPTII,S$GLB,, | Performed by: STUDENT IN AN ORGANIZED HEALTH CARE EDUCATION/TRAINING PROGRAM

## 2021-12-30 PROCEDURE — 99214 PR OFFICE/OUTPT VISIT, EST, LEVL IV, 30-39 MIN: ICD-10-PCS | Mod: S$GLB,,, | Performed by: STUDENT IN AN ORGANIZED HEALTH CARE EDUCATION/TRAINING PROGRAM

## 2021-12-30 PROCEDURE — 72040 XR CERVICAL SPINE AP LATERAL: ICD-10-PCS | Mod: 26,,, | Performed by: RADIOLOGY

## 2021-12-30 PROCEDURE — 1159F PR MEDICATION LIST DOCUMENTED IN MEDICAL RECORD: ICD-10-PCS | Mod: CPTII,S$GLB,, | Performed by: STUDENT IN AN ORGANIZED HEALTH CARE EDUCATION/TRAINING PROGRAM

## 2021-12-30 PROCEDURE — 99999 PR PBB SHADOW E&M-EST. PATIENT-LVL IV: ICD-10-PCS | Mod: PBBFAC,,, | Performed by: STUDENT IN AN ORGANIZED HEALTH CARE EDUCATION/TRAINING PROGRAM

## 2021-12-30 RX ORDER — GABAPENTIN 400 MG/1
400 CAPSULE ORAL 3 TIMES DAILY
COMMUNITY

## 2021-12-30 RX ORDER — ONDANSETRON HYDROCHLORIDE 8 MG/1
8 TABLET, FILM COATED ORAL 2 TIMES DAILY
Qty: 15 TABLET | Refills: 1 | Status: SHIPPED | OUTPATIENT
Start: 2021-12-30 | End: 2021-12-30 | Stop reason: SDUPTHER

## 2021-12-30 RX ORDER — VENLAFAXINE 37.5 MG/1
37.5 TABLET ORAL ONCE
COMMUNITY

## 2021-12-30 RX ORDER — QUETIAPINE FUMARATE 50 MG/1
50 TABLET, FILM COATED ORAL NIGHTLY
COMMUNITY

## 2021-12-30 RX ORDER — ONDANSETRON 4 MG/1
1 TABLET, ORALLY DISINTEGRATING ORAL EVERY 8 HOURS PRN
COMMUNITY
Start: 2021-12-23 | End: 2021-12-30

## 2021-12-30 RX ORDER — ONDANSETRON HYDROCHLORIDE 8 MG/1
8 TABLET, FILM COATED ORAL 2 TIMES DAILY
Qty: 15 TABLET | Refills: 1 | Status: SHIPPED | OUTPATIENT
Start: 2021-12-30 | End: 2022-03-07

## 2021-12-30 RX ORDER — BUTALBITAL, ACETAMINOPHEN AND CAFFEINE 50; 325; 40 MG/1; MG/1; MG/1
1 TABLET ORAL 3 TIMES DAILY
Qty: 30 TABLET | Refills: 1 | Status: SHIPPED | OUTPATIENT
Start: 2021-12-30 | End: 2022-04-05 | Stop reason: SDUPTHER

## 2021-12-30 RX ORDER — ARIPIPRAZOLE 5 MG/1
5 TABLET ORAL DAILY
COMMUNITY

## 2021-12-30 RX ORDER — BUTALBITAL, ACETAMINOPHEN AND CAFFEINE 50; 325; 40 MG/1; MG/1; MG/1
1 TABLET ORAL 3 TIMES DAILY
COMMUNITY
Start: 2021-12-23 | End: 2021-12-30 | Stop reason: SDUPTHER

## 2021-12-31 PROBLEM — K42.9 UMBILICAL HERNIA WITHOUT OBSTRUCTION AND WITHOUT GANGRENE: Status: ACTIVE | Noted: 2021-12-31

## 2021-12-31 PROBLEM — E16.2 HYPOGLYCEMIA: Status: ACTIVE | Noted: 2021-12-31

## 2022-01-03 ENCOUNTER — TELEPHONE (OUTPATIENT)
Dept: FAMILY MEDICINE | Facility: CLINIC | Age: 47
End: 2022-01-03
Payer: MEDICARE

## 2022-01-03 NOTE — TELEPHONE ENCOUNTER
----- Message from Li Gutierres sent at 1/3/2022 10:56 AM CST -----  Contact: Linda  .Type:  Test Results    Who Called:  Linda   Name of Test (Lab/Mammo/Etc):  labs  Date of Test:  12/30/21  Ordering Provider: Dr Walker   Where the test was performed:  [facility]  Would the patient rather a call back or a response via [facility]? call  Best Call Back Number:  [phone]  Additional Information:  please give patient a call back with status.  Thanks,  RP

## 2022-01-04 ENCOUNTER — TELEPHONE (OUTPATIENT)
Dept: FAMILY MEDICINE | Facility: CLINIC | Age: 47
End: 2022-01-04
Payer: MEDICARE

## 2022-01-04 NOTE — TELEPHONE ENCOUNTER
----- Message from Jess Fenton sent at 1/4/2022  1:52 PM CST -----  Contact: [phone]  Name of test:    Date of test: 12/30/21     Ordering provider: Royal Yael RAGSDALE    Where was the test performed:[facility]    Comments: patient stated that has been trying to reach out someone in the office for a few days. Thank you.

## 2022-01-04 NOTE — TELEPHONE ENCOUNTER
Patient contacted, advised that we will contact with lab results once they have all returned and received back from provider.     Patient is inquiring about neck xray results as well. Please advise.

## 2022-01-04 NOTE — TELEPHONE ENCOUNTER
----- Message from Roya Savage sent at 1/4/2022  2:05 PM CST -----  Contact: Self   Patient is returning a phone call.  Who left a message for the patient: Luanne Presley LPN  Does patient know what this is regarding:  results  Would you like a call back, or a response through your [facility] portal?:   call back  Comments:

## 2022-01-10 ENCOUNTER — TELEPHONE (OUTPATIENT)
Dept: FAMILY MEDICINE | Facility: CLINIC | Age: 47
End: 2022-01-10
Payer: MEDICARE

## 2022-01-10 NOTE — TELEPHONE ENCOUNTER
----- Message from Marcie Alvarado sent at 1/10/2022  9:12 AM CST -----  Contact: Patient [phone]  Calling to get test results.  Name of test (lab, x-ray): Labs  Date of test: 12/30/21  [place] [facility]  Would you like a call back, or a response through your [facility] portal?: call back   Comments:

## 2022-01-11 ENCOUNTER — TELEPHONE (OUTPATIENT)
Dept: FAMILY MEDICINE | Facility: CLINIC | Age: 47
End: 2022-01-11
Payer: MEDICARE

## 2022-01-11 DIAGNOSIS — G43.111 INTRACTABLE MIGRAINE WITH AURA WITH STATUS MIGRAINOSUS: Primary | ICD-10-CM

## 2022-01-11 NOTE — TELEPHONE ENCOUNTER
Patient called requesting lab and x-ray results. Informed patient of normal lab results, and that I would send a message to Dr. Walker for x-ray results. Patient verbalized understanding. She would like to know if the medications previously prescribed to her by Dr. Walker could be refilled, and would also like to know if a referral can be placed for Neurology.

## 2022-01-11 NOTE — TELEPHONE ENCOUNTER
----- Message from Diann Mcduffie sent at 1/11/2022  2:34 PM CST -----  Contact: Linda  Type:  Test Results    Who Called: Linda  Name of Test (Lab/Mammo/Etc): Labs, Xrays  Date of Test: 12/30/2021  Ordering Provider: Dr. Walker  Where the test was performed: [facility]  Would the patient rather a call back or a response via [facility]? Callback  Best Call Back Number: Please call her at [phone]  Additional Information:

## 2022-02-03 ENCOUNTER — PATIENT MESSAGE (OUTPATIENT)
Dept: FAMILY MEDICINE | Facility: CLINIC | Age: 47
End: 2022-02-03
Payer: MEDICARE

## 2022-02-09 PROBLEM — F20.9 SCHIZOPHRENIA: Status: ACTIVE | Noted: 2022-02-09

## 2022-03-05 DIAGNOSIS — R11.0 NAUSEA: ICD-10-CM

## 2022-03-07 RX ORDER — ONDANSETRON HYDROCHLORIDE 8 MG/1
TABLET, FILM COATED ORAL
Qty: 15 TABLET | Refills: 0 | Status: SHIPPED | OUTPATIENT
Start: 2022-03-07 | End: 2022-08-23

## 2022-03-18 ENCOUNTER — PATIENT MESSAGE (OUTPATIENT)
Dept: ADMINISTRATIVE | Facility: HOSPITAL | Age: 47
End: 2022-03-18
Payer: MEDICARE

## 2022-03-28 ENCOUNTER — OFFICE VISIT (OUTPATIENT)
Dept: NEUROLOGY | Facility: CLINIC | Age: 47
End: 2022-03-28
Payer: MEDICARE

## 2022-03-28 ENCOUNTER — PATIENT OUTREACH (OUTPATIENT)
Dept: ADMINISTRATIVE | Facility: OTHER | Age: 47
End: 2022-03-28
Payer: MEDICARE

## 2022-03-28 VITALS
WEIGHT: 157.44 LBS | RESPIRATION RATE: 17 BRPM | TEMPERATURE: 98 F | HEIGHT: 63 IN | BODY MASS INDEX: 27.89 KG/M2 | SYSTOLIC BLOOD PRESSURE: 102 MMHG | DIASTOLIC BLOOD PRESSURE: 63 MMHG | HEART RATE: 65 BPM

## 2022-03-28 DIAGNOSIS — Z12.11 ENCOUNTER FOR FECAL IMMUNOCHEMICAL TEST SCREENING: Primary | ICD-10-CM

## 2022-03-28 DIAGNOSIS — Z72.820 LACK OF ADEQUATE SLEEP: ICD-10-CM

## 2022-03-28 DIAGNOSIS — G44.40 REBOUND HEADACHE: ICD-10-CM

## 2022-03-28 DIAGNOSIS — G43.E09 CHRONIC MIGRAINE WITH AURA: Primary | ICD-10-CM

## 2022-03-28 DIAGNOSIS — M79.18 MYOFASCIAL PAIN: ICD-10-CM

## 2022-03-28 DIAGNOSIS — F15.90 CAFFEINE USE DISORDER: ICD-10-CM

## 2022-03-28 DIAGNOSIS — R11.2 NAUSEA AND VOMITING, INTRACTABILITY OF VOMITING NOT SPECIFIED, UNSPECIFIED VOMITING TYPE: ICD-10-CM

## 2022-03-28 DIAGNOSIS — Z98.890 HISTORY OF NECK SURGERY: ICD-10-CM

## 2022-03-28 PROCEDURE — 99214 OFFICE O/P EST MOD 30 MIN: CPT | Mod: 25,S$GLB,, | Performed by: PHYSICIAN ASSISTANT

## 2022-03-28 PROCEDURE — 3074F PR MOST RECENT SYSTOLIC BLOOD PRESSURE < 130 MM HG: ICD-10-PCS | Mod: CPTII,S$GLB,, | Performed by: PHYSICIAN ASSISTANT

## 2022-03-28 PROCEDURE — 3078F DIAST BP <80 MM HG: CPT | Mod: CPTII,S$GLB,, | Performed by: PHYSICIAN ASSISTANT

## 2022-03-28 PROCEDURE — 3078F PR MOST RECENT DIASTOLIC BLOOD PRESSURE < 80 MM HG: ICD-10-PCS | Mod: CPTII,S$GLB,, | Performed by: PHYSICIAN ASSISTANT

## 2022-03-28 PROCEDURE — 3074F SYST BP LT 130 MM HG: CPT | Mod: CPTII,S$GLB,, | Performed by: PHYSICIAN ASSISTANT

## 2022-03-28 PROCEDURE — 1160F PR REVIEW ALL MEDS BY PRESCRIBER/CLIN PHARMACIST DOCUMENTED: ICD-10-PCS | Mod: CPTII,S$GLB,, | Performed by: PHYSICIAN ASSISTANT

## 2022-03-28 PROCEDURE — 99999 PR PBB SHADOW E&M-EST. PATIENT-LVL V: CPT | Mod: PBBFAC,,, | Performed by: PHYSICIAN ASSISTANT

## 2022-03-28 PROCEDURE — 1160F RVW MEDS BY RX/DR IN RCRD: CPT | Mod: CPTII,S$GLB,, | Performed by: PHYSICIAN ASSISTANT

## 2022-03-28 PROCEDURE — 3008F PR BODY MASS INDEX (BMI) DOCUMENTED: ICD-10-PCS | Mod: CPTII,S$GLB,, | Performed by: PHYSICIAN ASSISTANT

## 2022-03-28 PROCEDURE — 64400: ICD-10-PCS | Mod: 50,S$GLB,, | Performed by: PHYSICIAN ASSISTANT

## 2022-03-28 PROCEDURE — 99999 PR PBB SHADOW E&M-EST. PATIENT-LVL V: ICD-10-PCS | Mod: PBBFAC,,, | Performed by: PHYSICIAN ASSISTANT

## 2022-03-28 PROCEDURE — 99214 PR OFFICE/OUTPT VISIT, EST, LEVL IV, 30-39 MIN: ICD-10-PCS | Mod: 25,S$GLB,, | Performed by: PHYSICIAN ASSISTANT

## 2022-03-28 PROCEDURE — 1159F MED LIST DOCD IN RCRD: CPT | Mod: CPTII,S$GLB,, | Performed by: PHYSICIAN ASSISTANT

## 2022-03-28 PROCEDURE — 64400 NJX AA&/STRD TRIGEMINAL NRV: CPT | Mod: 50,S$GLB,, | Performed by: PHYSICIAN ASSISTANT

## 2022-03-28 PROCEDURE — 1159F PR MEDICATION LIST DOCUMENTED IN MEDICAL RECORD: ICD-10-PCS | Mod: CPTII,S$GLB,, | Performed by: PHYSICIAN ASSISTANT

## 2022-03-28 PROCEDURE — 3008F BODY MASS INDEX DOCD: CPT | Mod: CPTII,S$GLB,, | Performed by: PHYSICIAN ASSISTANT

## 2022-03-28 RX ORDER — PROMETHAZINE HYDROCHLORIDE 25 MG/1
25 TABLET ORAL EVERY 6 HOURS PRN
Qty: 20 TABLET | Refills: 1 | Status: SHIPPED | OUTPATIENT
Start: 2022-03-28 | End: 2023-10-28

## 2022-03-28 RX ORDER — LANOLIN ALCOHOL/MO/W.PET/CERES
400 CREAM (GRAM) TOPICAL DAILY
COMMUNITY

## 2022-03-28 RX ORDER — GALCANEZUMAB 120 MG/ML
INJECTION, SOLUTION SUBCUTANEOUS
Qty: 2 EACH | Refills: 0 | Status: SHIPPED | OUTPATIENT
Start: 2022-03-28 | End: 2022-04-05 | Stop reason: SDUPTHER

## 2022-03-28 RX ORDER — BUPIVACAINE HYDROCHLORIDE 2.5 MG/ML
2 INJECTION, SOLUTION EPIDURAL; INFILTRATION; INTRACAUDAL ONCE
Status: COMPLETED | OUTPATIENT
Start: 2022-03-28 | End: 2022-03-28

## 2022-03-28 RX ORDER — GALCANEZUMAB 120 MG/ML
120 INJECTION, SOLUTION SUBCUTANEOUS
Qty: 1 EACH | Refills: 6 | Status: SHIPPED | OUTPATIENT
Start: 2022-03-28 | End: 2022-04-05 | Stop reason: SDUPTHER

## 2022-03-28 RX ADMIN — BUPIVACAINE HYDROCHLORIDE 5 MG: 2.5 INJECTION, SOLUTION EPIDURAL; INFILTRATION; INTRACAUDAL at 02:03
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
## 2022-03-28 NOTE — PROGRESS NOTES
Health Maintenance Due   Topic Date Due    Pneumococcal Vaccines (Age 0-64) (1 of 2 - PPSV23) Never done    TETANUS VACCINE  Never done    Lipid Panel  03/20/2019    Colorectal Cancer Screening  Never done     Updates were requested from care everywhere.  Chart was reviewed for overdue Proactive [facility] Encounters (FATEM[facility]) topics (CRS, Breast Cancer Screening, Eye exam)  Health Maintenance has been updated.  LINKS immunization registry triggered.  Immunizations were reconciled.

## 2022-03-28 NOTE — PATIENT INSTRUCTIONS
Today you received a trigeminal nerve block (marcaine was used)       I referred you to physical therapy and pain management      I sent phenergan to your pharmacy (for your nausea)    Emgality ordered (once a month injection) to help with the headaches

## 2022-04-01 ENCOUNTER — CLINICAL SUPPORT (OUTPATIENT)
Dept: REHABILITATION | Facility: HOSPITAL | Age: 47
End: 2022-04-01
Payer: MEDICARE

## 2022-04-01 DIAGNOSIS — R29.898 UPPER EXTREMITY WEAKNESS: ICD-10-CM

## 2022-04-01 DIAGNOSIS — M79.18 MYOFASCIAL PAIN: ICD-10-CM

## 2022-04-01 DIAGNOSIS — G89.29 CHRONIC NECK PAIN: Primary | ICD-10-CM

## 2022-04-01 DIAGNOSIS — M54.2 CHRONIC NECK PAIN: Primary | ICD-10-CM

## 2022-04-01 DIAGNOSIS — Z74.09 IMPAIRED FUNCTIONAL MOBILITY AND ENDURANCE: ICD-10-CM

## 2022-04-01 PROCEDURE — 97110 THERAPEUTIC EXERCISES: CPT | Mod: PN

## 2022-04-01 PROCEDURE — 97162 PT EVAL MOD COMPLEX 30 MIN: CPT | Mod: PN

## 2022-04-04 PROBLEM — Z74.09 IMPAIRED FUNCTIONAL MOBILITY AND ENDURANCE: Status: ACTIVE | Noted: 2022-04-04

## 2022-04-04 PROBLEM — R29.898 UPPER EXTREMITY WEAKNESS: Status: ACTIVE | Noted: 2022-04-04

## 2022-04-05 ENCOUNTER — CLINICAL SUPPORT (OUTPATIENT)
Dept: REHABILITATION | Facility: HOSPITAL | Age: 47
End: 2022-04-05
Attending: PHYSICIAN ASSISTANT
Payer: MEDICARE

## 2022-04-05 ENCOUNTER — TELEPHONE (OUTPATIENT)
Dept: NEUROLOGY | Facility: CLINIC | Age: 47
End: 2022-04-05
Payer: MEDICARE

## 2022-04-05 DIAGNOSIS — G44.40 REBOUND HEADACHE: ICD-10-CM

## 2022-04-05 DIAGNOSIS — R29.898 UPPER EXTREMITY WEAKNESS: Primary | ICD-10-CM

## 2022-04-05 DIAGNOSIS — G43.111 INTRACTABLE MIGRAINE WITH AURA WITH STATUS MIGRAINOSUS: ICD-10-CM

## 2022-04-05 DIAGNOSIS — M79.18 MYOFASCIAL PAIN: ICD-10-CM

## 2022-04-05 DIAGNOSIS — Z74.09 IMPAIRED FUNCTIONAL MOBILITY AND ENDURANCE: ICD-10-CM

## 2022-04-05 DIAGNOSIS — G43.E09 CHRONIC MIGRAINE WITH AURA: ICD-10-CM

## 2022-04-05 PROCEDURE — 97110 THERAPEUTIC EXERCISES: CPT | Mod: PN

## 2022-04-05 PROCEDURE — 97112 NEUROMUSCULAR REEDUCATION: CPT | Mod: PN

## 2022-04-05 RX ORDER — GALCANEZUMAB 120 MG/ML
INJECTION, SOLUTION SUBCUTANEOUS
Qty: 2 ML | Refills: 0 | Status: SHIPPED | OUTPATIENT
Start: 2022-04-05

## 2022-04-05 RX ORDER — BUTALBITAL, ACETAMINOPHEN AND CAFFEINE 50; 325; 40 MG/1; MG/1; MG/1
TABLET ORAL
Qty: 20 TABLET | Refills: 0 | Status: SHIPPED | OUTPATIENT
Start: 2022-04-05 | End: 2022-05-04

## 2022-04-05 RX ORDER — GALCANEZUMAB 120 MG/ML
120 INJECTION, SOLUTION SUBCUTANEOUS
Qty: 1 ML | Refills: 6 | Status: SHIPPED | OUTPATIENT
Start: 2022-04-05 | End: 2022-12-29 | Stop reason: SDUPTHER

## 2022-04-05 NOTE — TELEPHONE ENCOUNTER
personally reviewed.     Will write supply of fioricet, 1 pill up to 3x a day, no use more than 3 days out of week, 20 pills needs to last >1 month. Medication dispatched to her pharmacy on file.     JAGDEEP

## 2022-04-05 NOTE — TELEPHONE ENCOUNTER
----- Message from Michael Bertrand sent at 4/5/2022 12:32 PM CDT -----  Regarding: deedee authhorization  Contact: Patient  Patient want to speak with a nurse regarding deedee authorization, please call back at [phone] (home)     [id number] - [address] & [address] [address]  Phone: [phone] Fax: [phone]       Case number [id number]

## 2022-04-05 NOTE — TELEPHONE ENCOUNTER
Called patient and notified that Emgality sent to [facility] so that the PA department here can complete the PA. Patient then request refill for Fioricet. Notified patient would send to provider to review. Verbalized understanding.
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
## 2022-04-06 ENCOUNTER — TELEPHONE (OUTPATIENT)
Dept: PHARMACY | Facility: CLINIC | Age: 47
End: 2022-04-06
Payer: MEDICARE

## 2022-04-19 ENCOUNTER — TELEPHONE (OUTPATIENT)
Dept: NEUROLOGY | Facility: CLINIC | Age: 47
End: 2022-04-19
Payer: MEDICARE

## 2022-04-19 NOTE — TELEPHONE ENCOUNTER
----- Message from Nehal Stubbs sent at 4/19/2022 11:29 AM CDT -----  Contact: pt  Type: Needs Medical Advice  Who Called:  pt  Best Call Back Number: [phone]   Additional Information: pt went to pick medication at [facility]'s pt states [facility] says the medication  galcanezumab-gnlm (EMGALITY PEN) 120 mg/mL PnIj cost 1000 dollars and ask that nurse give her a call back

## 2022-04-29 ENCOUNTER — PATIENT OUTREACH (OUTPATIENT)
Dept: ADMINISTRATIVE | Facility: OTHER | Age: 47
End: 2022-04-29
Payer: MEDICARE

## 2022-04-29 NOTE — PROGRESS NOTES
Chart was reviewed for overdue Proactive [facility] Encounters (FATEMEH)  topics  Updates were requested from care everywhere  Health Maintenance has been updated  [place] immunization registry triggered

## 2022-05-04 ENCOUNTER — OFFICE VISIT (OUTPATIENT)
Dept: ENDOCRINOLOGY | Facility: CLINIC | Age: 47
End: 2022-05-04
Payer: MEDICARE

## 2022-05-04 VITALS
BODY MASS INDEX: 23.27 KG/M2 | HEART RATE: 56 BPM | TEMPERATURE: 98 F | WEIGHT: 153.56 LBS | HEIGHT: 68 IN | OXYGEN SATURATION: 98 % | SYSTOLIC BLOOD PRESSURE: 102 MMHG | DIASTOLIC BLOOD PRESSURE: 72 MMHG

## 2022-05-04 DIAGNOSIS — E16.2 HYPOGLYCEMIA: Primary | ICD-10-CM

## 2022-05-04 LAB — GLUCOSE SERPL-MCNC: 142 MG/DL (ref 70–110)

## 2022-05-04 PROCEDURE — 1159F MED LIST DOCD IN RCRD: CPT | Mod: CPTII,S$GLB,, | Performed by: PHYSICIAN ASSISTANT

## 2022-05-04 PROCEDURE — 99213 OFFICE O/P EST LOW 20 MIN: CPT | Mod: S$GLB,,, | Performed by: PHYSICIAN ASSISTANT

## 2022-05-04 PROCEDURE — 3078F DIAST BP <80 MM HG: CPT | Mod: CPTII,S$GLB,, | Performed by: PHYSICIAN ASSISTANT

## 2022-05-04 PROCEDURE — 82962 GLUCOSE BLOOD TEST: CPT | Mod: S$GLB,,, | Performed by: PHYSICIAN ASSISTANT

## 2022-05-04 PROCEDURE — 3008F PR BODY MASS INDEX (BMI) DOCUMENTED: ICD-10-PCS | Mod: CPTII,S$GLB,, | Performed by: PHYSICIAN ASSISTANT

## 2022-05-04 PROCEDURE — 1159F PR MEDICATION LIST DOCUMENTED IN MEDICAL RECORD: ICD-10-PCS | Mod: CPTII,S$GLB,, | Performed by: PHYSICIAN ASSISTANT

## 2022-05-04 PROCEDURE — 1160F RVW MEDS BY RX/DR IN RCRD: CPT | Mod: CPTII,S$GLB,, | Performed by: PHYSICIAN ASSISTANT

## 2022-05-04 PROCEDURE — 99213 PR OFFICE/OUTPT VISIT, EST, LEVL III, 20-29 MIN: ICD-10-PCS | Mod: S$GLB,,, | Performed by: PHYSICIAN ASSISTANT

## 2022-05-04 PROCEDURE — 3008F BODY MASS INDEX DOCD: CPT | Mod: CPTII,S$GLB,, | Performed by: PHYSICIAN ASSISTANT

## 2022-05-04 PROCEDURE — 3074F PR MOST RECENT SYSTOLIC BLOOD PRESSURE < 130 MM HG: ICD-10-PCS | Mod: CPTII,S$GLB,, | Performed by: PHYSICIAN ASSISTANT

## 2022-05-04 PROCEDURE — 99999 PR PBB SHADOW E&M-EST. PATIENT-LVL V: CPT | Mod: PBBFAC,,, | Performed by: PHYSICIAN ASSISTANT

## 2022-05-04 PROCEDURE — 3078F PR MOST RECENT DIASTOLIC BLOOD PRESSURE < 80 MM HG: ICD-10-PCS | Mod: CPTII,S$GLB,, | Performed by: PHYSICIAN ASSISTANT

## 2022-05-04 PROCEDURE — 99999 PR PBB SHADOW E&M-EST. PATIENT-LVL V: ICD-10-PCS | Mod: PBBFAC,,, | Performed by: PHYSICIAN ASSISTANT

## 2022-05-04 PROCEDURE — 3074F SYST BP LT 130 MM HG: CPT | Mod: CPTII,S$GLB,, | Performed by: PHYSICIAN ASSISTANT

## 2022-05-04 PROCEDURE — 82962 POCT GLUCOSE, HAND-HELD DEVICE: ICD-10-PCS | Mod: S$GLB,,, | Performed by: PHYSICIAN ASSISTANT

## 2022-05-04 PROCEDURE — 1160F PR REVIEW ALL MEDS BY PRESCRIBER/CLIN PHARMACIST DOCUMENTED: ICD-10-PCS | Mod: CPTII,S$GLB,, | Performed by: PHYSICIAN ASSISTANT

## 2022-05-04 RX ORDER — LANCETS 33 GAUGE
EACH MISCELLANEOUS
Qty: 100 EACH | Refills: 3 | Status: SHIPPED | OUTPATIENT
Start: 2022-05-04

## 2022-05-04 RX ORDER — INSULIN PUMP SYRINGE, 3 ML
EACH MISCELLANEOUS
Qty: 1 EACH | Refills: 0 | Status: SHIPPED | OUTPATIENT
Start: 2022-05-04

## 2022-05-04 NOTE — PROGRESS NOTES
"CC: Hypoglycemia    HPI: Linda Sullivan is a 47 y.o. female here for hypoglycemia along with pending conditions listed in the Visit Diagnosis. New to endocrine. Reports episodes of dizziness, nausea, tremors. She has been drinking cokes frequently. Dx in HS. Reports episodes occur multiple times during the day. She will also eat oranges and veggie sticks. + polydipsia, polyuria.     Diet:  BF-skips  LH-sandwich, chips  DN-apple, nectirines, veggie chips  Drinks coffee, gatorade.     She has three children. Hx of partial hysterectomy.    PMHx, PSHx: reviewed in epic.    Social Hx: no ETOH use. Smokes 4 packs weekly since she was 11 yo.    Wt Readings from Last 6 Encounters:   05/04/22 69.7 kg (153 lb 8.8 oz)   03/28/22 71.4 kg (157 lb 6.5 oz)   12/30/21 62.9 kg (138 lb 11.2 oz)   10/01/21 60.2 kg (132 lb 11.5 oz)   06/23/21 60.3 kg (133 lb)   01/25/21 62.5 kg (137 lb 12.6 oz)      ROS:   Constitutional: + wt loss 4 lbs  Eyes: No recent visual changes  Cardiovascular: Denies current anginal symptoms  Respiratory: Denies current respiratory difficulty  Gastrointestinal: Denies recent bowel disturbances  GenitoUrinary - No dysuria  Skin: No new skin rash  Neurologic: No focal neurologic complaints  Musculoskeletal: no joint pain  Endocrine: no polyphagia, polydipsia or polyuria  Remainder ROS negative     /72 (BP Location: Right arm, Patient Position: Sitting, BP Method: Small (Manual))   Pulse (!) 56   Temp 97.7 °F (36.5 °C) (Oral)   Ht 5' 8" (1.727 m)   Wt 69.7 kg (153 lb 8.8 oz)   SpO2 98%   BMI 23.35 kg/m²      Personally reviewed labs below:    Lab Results   Component Value Date    TSH 2.404 12/30/2021    D0FBQKR 6.2 05/19/2006    FREET4 0.84 07/01/2013          Chemistry        Component Value Date/Time     12/30/2021 1106    K 3.9 12/30/2021 1106     12/30/2021 1106    CO2 28 12/30/2021 1106    BUN 8 12/30/2021 1106    CREATININE 0.7 12/30/2021 1106    GLU 84 12/30/2021 1106      "   Component Value Date/Time    CALCIUM 9.5 12/30/2021 1106    ALKPHOS 48 (L) 12/30/2021 1106    AST 26 12/30/2021 1106    ALT 35 12/30/2021 1106    BILITOT 0.9 12/30/2021 1106    ESTGFRAFRICA >60.0 12/30/2021 1106    EGFRNONAA >60.0 12/30/2021 1106         Lab Results   Component Value Date    HGBA1C 4.7 12/30/2021      PE:  GENERAL: middle aged female, well developed, well nourished  NECK: Supple neck, normal thyroid. No bruit  LYMPHATIC: No cervical or supraclavicular lymphadenopathy  CARDIOVASCULAR: Normal heart sounds, no pedal edema  RESPIRATORY: Normal effort, clear to auscultation  MUSC: 2+ DTR UE/LE  NEURO: steady gait, CN ll-Xll grossly intact  PSYCH: normal mood and affect    Assessment/Plan:   1. Hypoglycemia  POCT Glucose, Hand-Held Device    blood-glucose meter kit    Glucose Monitoring Continuous Min 72 Hours    blood sugar diagnostic Strp    lancets (ONETOUCH DELICA LANCETS) 33 gauge Misc      Hypoglycemia-start checking glucose when feeling symptoms. Increase protein with meals. Dexcom pro. Referral to [facility].    Dexcom pro  [facility]  F/u in 6 mths

## 2022-05-07 DIAGNOSIS — M79.18 MYOFASCIAL PAIN: ICD-10-CM

## 2022-05-07 DIAGNOSIS — G43.E09 CHRONIC MIGRAINE WITH AURA: ICD-10-CM

## 2022-05-07 DIAGNOSIS — G44.40 REBOUND HEADACHE: ICD-10-CM

## 2022-05-09 RX ORDER — GALCANEZUMAB 120 MG/ML
INJECTION, SOLUTION SUBCUTANEOUS
Qty: 2 ML | Refills: 0 | OUTPATIENT
Start: 2022-05-09

## 2022-05-10 ENCOUNTER — PROCEDURE VISIT (OUTPATIENT)
Dept: DIABETES | Facility: CLINIC | Age: 47
End: 2022-05-10
Payer: MEDICARE

## 2022-05-10 DIAGNOSIS — E16.2 HYPOGLYCEMIA: Primary | ICD-10-CM

## 2022-05-10 NOTE — PROGRESS NOTES
"DIABETES EDUCATOR NOTE   PLACEMENT OF DEXCOM G6 PRO SENSOR  CONTINOUS GLUCOSE MONITORING SYSTEM (CGMS)    Patient is here in clinic today for placement of continuous glucose monitoring sensor.                 Patient verified that they were here for CGMS procedure ordered by their provider and that they have a working glucose meter and supplies at home.     Patient provided with a Dexcom G6 Sensor and a copy of the Blinded CGM Patient Handout.  A detailed explanation of Continuous Glucose Monitoring was provided. Patient informed that this is a blind procedure and that they will not actually see the blood sugar tracing in real time.  Reviewed with patient the  patient education handout called "Dexcom G6 Pro Continuous Glucose Monitoring System; Using Your G6 Pro" to review self-care during the study to avoid sensor loosening or removal ie... bathing, swimming, dressing, and exercising.    Instructed patient to check blood sugar using home glucometer and to record the following on provided patient log sheets: Blood sugar taken at home, meals and snacks, activity, and diabetes medications taken and dosage.    Patient was brought to a private location. Insertion was selected and prepared and allowed to dry. Glucose Sensor Serial Number [id number] was inserted to back of patient's LEFT abdomen.                 The following forms were given and reviewed in detail with patient and all questions answered:  · Blinded CGM Patient Handout  · Dexcom G6 Pro Continuous Glucose Monitoring System--Using Your G6 Pro,  For Blinded Patient Only          Time: 15 minutes     "

## 2022-05-13 ENCOUNTER — PATIENT MESSAGE (OUTPATIENT)
Dept: SMOKING CESSATION | Facility: CLINIC | Age: 47
End: 2022-05-13
Payer: MEDICARE

## 2022-05-17 ENCOUNTER — NUTRITION (OUTPATIENT)
Dept: DIABETES | Facility: CLINIC | Age: 47
End: 2022-05-17
Payer: MEDICARE

## 2022-05-17 DIAGNOSIS — E16.2 HYPOGLYCEMIA: ICD-10-CM

## 2022-05-17 PROCEDURE — 95250 CONT GLUC MNTR PHYS/QHP EQP: CPT | Mod: S$GLB,,, | Performed by: NUTRITIONIST

## 2022-05-17 PROCEDURE — 99999 PR PBB SHADOW E&M-EST. PATIENT-LVL II: CPT | Mod: PBBFAC,,, | Performed by: NUTRITIONIST

## 2022-05-17 PROCEDURE — 99999 PR PBB SHADOW E&M-EST. PATIENT-LVL II: ICD-10-PCS | Mod: PBBFAC,,, | Performed by: NUTRITIONIST

## 2022-05-17 PROCEDURE — 95250 PR GLUCOSE MONITORING,72 HRS,SUB-Q SENSOR: ICD-10-PCS | Mod: S$GLB,,, | Performed by: NUTRITIONIST

## 2022-05-17 NOTE — PROGRESS NOTES
DIABETES EDUCATOR NOTE   Return of the Dexcom G6 Pro Sensor      Patient returned to clinic today to remove Glucose Sensor.      The CGMS Sensor will be scanned and downloaded. All reports will be imported into the patient's electronic medical record.     Endocrine provider will complete data interpretation and make recommendations; will forward recommendations to the ordering provider for follow up with patient.

## 2022-05-26 ENCOUNTER — TELEPHONE (OUTPATIENT)
Dept: ENDOCRINOLOGY | Facility: CLINIC | Age: 47
End: 2022-05-26
Payer: MEDICARE

## 2022-05-26 NOTE — TELEPHONE ENCOUNTER
Thyroid hormones, electrolytes, blood sugar and kidney function are normal. Cholesterol is in the desired range. Her vitamin D is low. Start taking 2000 IU daily of Vitamin D. The glucose monitor did not show significant lows. The lows that did occur usually happened after an increase in blood sugar. Please meet with the dietician to evaluate your diet. Increase protein in diet.      Lab Amol 3/4/22 Dr. Montenegro  A1c 4.8%  Chol 211  Trig 63  HDL 88    CMP wnl  ALP 37 Low  CBC wnl    TSH 1.88  VD 25.8

## 2022-05-26 NOTE — TELEPHONE ENCOUNTER
Spoke with patient, relayed CGM reviews and lab results, per Ms. Benoit, also patient daughter will  labs and copy of CGM review.

## 2022-05-27 ENCOUNTER — PATIENT MESSAGE (OUTPATIENT)
Dept: FAMILY MEDICINE | Facility: CLINIC | Age: 47
End: 2022-05-27
Payer: MEDICARE

## 2022-06-02 ENCOUNTER — PATIENT MESSAGE (OUTPATIENT)
Dept: DIABETES | Facility: CLINIC | Age: 47
End: 2022-06-02
Payer: MEDICARE

## 2022-06-06 ENCOUNTER — PATIENT OUTREACH (OUTPATIENT)
Dept: DIABETES | Facility: CLINIC | Age: 47
End: 2022-06-06
Payer: MEDICARE

## 2022-06-06 ENCOUNTER — PATIENT MESSAGE (OUTPATIENT)
Dept: NEUROLOGY | Facility: CLINIC | Age: 47
End: 2022-06-06
Payer: MEDICARE

## 2022-06-06 NOTE — PROGRESS NOTES
Finally reached pt to speak with her regarding appointment for DM Education on 6/8/22 which she requested to be changed.  Wanted to reschedule while she was on the phone but she prefers to call back to schedule.

## 2022-06-07 ENCOUNTER — OFFICE VISIT (OUTPATIENT)
Dept: NEUROLOGY | Facility: CLINIC | Age: 47
End: 2022-06-07
Payer: MEDICARE

## 2022-06-07 VITALS
DIASTOLIC BLOOD PRESSURE: 77 MMHG | WEIGHT: 158.5 LBS | SYSTOLIC BLOOD PRESSURE: 111 MMHG | BODY MASS INDEX: 24.1 KG/M2 | RESPIRATION RATE: 20 BRPM | HEART RATE: 56 BPM

## 2022-06-07 DIAGNOSIS — M79.18 MYOFASCIAL PAIN: ICD-10-CM

## 2022-06-07 DIAGNOSIS — G43.E09 CHRONIC MIGRAINE WITH AURA: Primary | ICD-10-CM

## 2022-06-07 DIAGNOSIS — G44.40 MEDICATION OVERUSE HEADACHE: ICD-10-CM

## 2022-06-07 PROCEDURE — 3074F PR MOST RECENT SYSTOLIC BLOOD PRESSURE < 130 MM HG: ICD-10-PCS | Mod: CPTII,S$GLB,, | Performed by: PHYSICIAN ASSISTANT

## 2022-06-07 PROCEDURE — 1160F RVW MEDS BY RX/DR IN RCRD: CPT | Mod: CPTII,S$GLB,, | Performed by: PHYSICIAN ASSISTANT

## 2022-06-07 PROCEDURE — 3078F DIAST BP <80 MM HG: CPT | Mod: CPTII,S$GLB,, | Performed by: PHYSICIAN ASSISTANT

## 2022-06-07 PROCEDURE — 99999 PR PBB SHADOW E&M-EST. PATIENT-LVL V: ICD-10-PCS | Mod: PBBFAC,,, | Performed by: PHYSICIAN ASSISTANT

## 2022-06-07 PROCEDURE — 3008F PR BODY MASS INDEX (BMI) DOCUMENTED: ICD-10-PCS | Mod: CPTII,S$GLB,, | Performed by: PHYSICIAN ASSISTANT

## 2022-06-07 PROCEDURE — 3074F SYST BP LT 130 MM HG: CPT | Mod: CPTII,S$GLB,, | Performed by: PHYSICIAN ASSISTANT

## 2022-06-07 PROCEDURE — 1160F PR REVIEW ALL MEDS BY PRESCRIBER/CLIN PHARMACIST DOCUMENTED: ICD-10-PCS | Mod: CPTII,S$GLB,, | Performed by: PHYSICIAN ASSISTANT

## 2022-06-07 PROCEDURE — 1159F MED LIST DOCD IN RCRD: CPT | Mod: CPTII,S$GLB,, | Performed by: PHYSICIAN ASSISTANT

## 2022-06-07 PROCEDURE — 99999 PR PBB SHADOW E&M-EST. PATIENT-LVL V: CPT | Mod: PBBFAC,,, | Performed by: PHYSICIAN ASSISTANT

## 2022-06-07 PROCEDURE — 3078F PR MOST RECENT DIASTOLIC BLOOD PRESSURE < 80 MM HG: ICD-10-PCS | Mod: CPTII,S$GLB,, | Performed by: PHYSICIAN ASSISTANT

## 2022-06-07 PROCEDURE — 99214 PR OFFICE/OUTPT VISIT, EST, LEVL IV, 30-39 MIN: ICD-10-PCS | Mod: S$GLB,,, | Performed by: PHYSICIAN ASSISTANT

## 2022-06-07 PROCEDURE — 3008F BODY MASS INDEX DOCD: CPT | Mod: CPTII,S$GLB,, | Performed by: PHYSICIAN ASSISTANT

## 2022-06-07 PROCEDURE — 99214 OFFICE O/P EST MOD 30 MIN: CPT | Mod: S$GLB,,, | Performed by: PHYSICIAN ASSISTANT

## 2022-06-07 PROCEDURE — 1159F PR MEDICATION LIST DOCUMENTED IN MEDICAL RECORD: ICD-10-PCS | Mod: CPTII,S$GLB,, | Performed by: PHYSICIAN ASSISTANT

## 2022-06-07 RX ORDER — HYDROXYZINE PAMOATE 25 MG/1
CAPSULE ORAL
Qty: 15 CAPSULE | Refills: 0 | Status: SHIPPED | OUTPATIENT
Start: 2022-06-07 | End: 2022-07-25 | Stop reason: SDUPTHER
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
## 2022-06-07 NOTE — PATIENT INSTRUCTIONS
"Suggested that the patient research out OTC supplements (stressed NOT FDA regulated and should take at own risk).    To help prevent a headache:   Petadolex (butterbur root)  Vitamin B2 (riboflavin)  CoQ10  Magnesium  "Migraeeze" which is petadolex/Vitamin B2/ginger  "Dolovent" which is magnesium/Vitamin B2/coq10    * all of the above can be found locally in a variety of shops or on the internet     To help stop a headache while it occurs:   Ausanil (nasal spray that has some components of pepper plant extract and ginger plant essence). Information from the creator's website (http://ausanilmycujoo/faq/).  Per reports, can cause mild discomfort upon application, which ultimately helps stops the headache.      *It can be found online for purchase from multiple websites       Consider the OpVista device, www.OpVista.[employer]oSpin, please research, this is TENs unit designed in [place] and was FDA approved in the early 2010s for migraines.         Slowly cut back on your pain medicine (SLOWLY)      To help break up the headaches:  Vistaril (hydroxyzine) 1 pill 3x a day for 2 days, rest left over every 8 hours as needed for headache (no driving)     "

## 2022-06-07 NOTE — LETTER
June 7, 2022    Linda Sullivan  [address] [facility] [place] [address]         [facility]  [address]  [facility] [address]  Phone: [phone]  Fax: [phone] June 7, 2022     Patient: Linda Sullivan   YOB: 1975   Date of Visit: 6/7/2022       To Whom It May Concern:    It is my medical opinion that Linda Sullivan may return to work on 10 [address] 2022.She is being treated for a neurologic condition. Thank you for your due diligence in these matters.     If you have any questions or concerns, please don't hesitate to call.    Regards,              Marcelino Perez PA-C

## 2022-06-29 ENCOUNTER — TELEPHONE (OUTPATIENT)
Dept: NEUROLOGY | Facility: CLINIC | Age: 47
End: 2022-06-29
Payer: MEDICARE

## 2022-06-29 NOTE — TELEPHONE ENCOUNTER
Attempted to call patient to reschedule Botox due to provider having family emergency. No Answer and voice mail full.     Called again and got in touch with patient, changed to appointment to Luanne Mcduffie NP.  Same date/time confirmed.      General

## 2022-06-30 ENCOUNTER — PROCEDURE VISIT (OUTPATIENT)
Dept: NEUROLOGY | Facility: CLINIC | Age: 47
End: 2022-06-30
Payer: MEDICARE

## 2022-06-30 ENCOUNTER — TELEPHONE (OUTPATIENT)
Dept: NEUROLOGY | Facility: CLINIC | Age: 47
End: 2022-06-30

## 2022-06-30 VITALS
DIASTOLIC BLOOD PRESSURE: 85 MMHG | BODY MASS INDEX: 24.06 KG/M2 | HEIGHT: 68 IN | RESPIRATION RATE: 17 BRPM | SYSTOLIC BLOOD PRESSURE: 124 MMHG | WEIGHT: 158.75 LBS | HEART RATE: 66 BPM

## 2022-06-30 DIAGNOSIS — G43.719 INTRACTABLE CHRONIC MIGRAINE WITHOUT AURA AND WITHOUT STATUS MIGRAINOSUS: Primary | ICD-10-CM

## 2022-06-30 PROCEDURE — 64615 CHEMODENERV MUSC MIGRAINE: CPT | Mod: S$GLB,,, | Performed by: NURSE PRACTITIONER

## 2022-06-30 PROCEDURE — 64615 PR CHEMODENERVATION OF MUSCLE FOR CHRONIC MIGRAINE: ICD-10-PCS | Mod: S$GLB,,, | Performed by: NURSE PRACTITIONER

## 2022-06-30 NOTE — PROCEDURES

## 2022-07-21 ENCOUNTER — TELEPHONE (OUTPATIENT)
Dept: NEUROLOGY | Facility: CLINIC | Age: 47
End: 2022-07-21
Payer: MEDICARE

## 2022-07-21 NOTE — TELEPHONE ENCOUNTER
Called patient to reschedule appointment but no answer. Tried to leave a voicemail but mailbox was full. Notified patient through [employer].

## 2022-07-21 NOTE — TELEPHONE ENCOUNTER
----- Message from Christiano Lopez sent at 7/21/2022  9:41 AM CDT -----  Regarding: med request  Type: Needs Medical Advice    Who Called:  Linda    Pharm:  [id number] - [place] [id number] [facility] AT [facility] & [address] [address]  Phone: [phone] Fax: [phone]    Best Call Back Number: [phone]     Additional Information: pt would like to start Topamax again. Please call to discuss.

## 2022-07-21 NOTE — TELEPHONE ENCOUNTER
----- Message from Christiano Lopez sent at 7/21/2022 10:40 AM CDT -----  Regarding: sooner appt  Type:  Sooner Appointment Request    Caller is requesting a sooner appointment.  Caller declined first available appointment listed below.  Caller will not accept being placed on the waitlist and is requesting a message be sent to doctor.    Name of Caller:  Linda    When is the first available appointment?  9/15/22    Symptoms:  restart med    Best Call Back Number:  [phone]     Additional Information:  pt has appt 7/25 but wants to know if can get 7/26 b/c of transpo

## 2022-07-21 NOTE — TELEPHONE ENCOUNTER
Called patient and notified that if she wants to get back on medication that she will need to be seen. Appointment from 8/11/22 rescheduled. Date/Time confirmed. Verbalized understanding.

## 2022-07-25 ENCOUNTER — OFFICE VISIT (OUTPATIENT)
Dept: NEUROLOGY | Facility: CLINIC | Age: 47
End: 2022-07-25
Payer: MEDICARE

## 2022-07-25 ENCOUNTER — LAB VISIT (OUTPATIENT)
Dept: LAB | Facility: HOSPITAL | Age: 47
End: 2022-07-25
Attending: PHYSICIAN ASSISTANT
Payer: MEDICARE

## 2022-07-25 VITALS
HEART RATE: 71 BPM | HEIGHT: 68 IN | BODY MASS INDEX: 24.91 KG/M2 | WEIGHT: 164.38 LBS | DIASTOLIC BLOOD PRESSURE: 69 MMHG | RESPIRATION RATE: 17 BRPM | SYSTOLIC BLOOD PRESSURE: 103 MMHG

## 2022-07-25 DIAGNOSIS — M54.2 NECK PAIN: ICD-10-CM

## 2022-07-25 DIAGNOSIS — Z98.890 HISTORY OF NECK SURGERY: ICD-10-CM

## 2022-07-25 DIAGNOSIS — G43.719 INTRACTABLE CHRONIC MIGRAINE WITHOUT AURA AND WITHOUT STATUS MIGRAINOSUS: Primary | ICD-10-CM

## 2022-07-25 DIAGNOSIS — R51.9 WORSENING HEADACHES: ICD-10-CM

## 2022-07-25 DIAGNOSIS — F40.240 CLAUSTROPHOBIA: ICD-10-CM

## 2022-07-25 DIAGNOSIS — G43.719 INTRACTABLE CHRONIC MIGRAINE WITHOUT AURA AND WITHOUT STATUS MIGRAINOSUS: ICD-10-CM

## 2022-07-25 LAB
ALBUMIN SERPL BCP-MCNC: 4.3 G/DL (ref 3.5–5.2)
ALP SERPL-CCNC: 41 U/L (ref 55–135)
ALT SERPL W/O P-5'-P-CCNC: 16 U/L (ref 10–44)
ANION GAP SERPL CALC-SCNC: 10 MMOL/L (ref 8–16)
AST SERPL-CCNC: 18 U/L (ref 10–40)
BILIRUB SERPL-MCNC: 0.9 MG/DL (ref 0.1–1)
BUN SERPL-MCNC: 14 MG/DL (ref 6–20)
CALCIUM SERPL-MCNC: 9.3 MG/DL (ref 8.7–10.5)
CHLORIDE SERPL-SCNC: 100 MMOL/L (ref 95–110)
CO2 SERPL-SCNC: 26 MMOL/L (ref 23–29)
CREAT SERPL-MCNC: 0.7 MG/DL (ref 0.5–1.4)
EST. GFR  (AFRICAN AMERICAN): >60 ML/MIN/1.73 M^2
EST. GFR  (NON AFRICAN AMERICAN): >60 ML/MIN/1.73 M^2
GLUCOSE SERPL-MCNC: 85 MG/DL (ref 70–110)
POTASSIUM SERPL-SCNC: 4.5 MMOL/L (ref 3.5–5.1)
PROT SERPL-MCNC: 7.6 G/DL (ref 6–8.4)
SODIUM SERPL-SCNC: 136 MMOL/L (ref 136–145)

## 2022-07-25 PROCEDURE — 99999 PR PBB SHADOW E&M-EST. PATIENT-LVL V: CPT | Mod: PBBFAC,,, | Performed by: PHYSICIAN ASSISTANT

## 2022-07-25 PROCEDURE — 99214 OFFICE O/P EST MOD 30 MIN: CPT | Mod: S$GLB,,, | Performed by: PHYSICIAN ASSISTANT

## 2022-07-25 PROCEDURE — 3078F DIAST BP <80 MM HG: CPT | Mod: CPTII,S$GLB,, | Performed by: PHYSICIAN ASSISTANT

## 2022-07-25 PROCEDURE — 1160F RVW MEDS BY RX/DR IN RCRD: CPT | Mod: CPTII,S$GLB,, | Performed by: PHYSICIAN ASSISTANT

## 2022-07-25 PROCEDURE — 36415 COLL VENOUS BLD VENIPUNCTURE: CPT | Mod: PO | Performed by: PHYSICIAN ASSISTANT

## 2022-07-25 PROCEDURE — 1159F MED LIST DOCD IN RCRD: CPT | Mod: CPTII,S$GLB,, | Performed by: PHYSICIAN ASSISTANT

## 2022-07-25 PROCEDURE — 3008F PR BODY MASS INDEX (BMI) DOCUMENTED: ICD-10-PCS | Mod: CPTII,S$GLB,, | Performed by: PHYSICIAN ASSISTANT

## 2022-07-25 PROCEDURE — 3078F PR MOST RECENT DIASTOLIC BLOOD PRESSURE < 80 MM HG: ICD-10-PCS | Mod: CPTII,S$GLB,, | Performed by: PHYSICIAN ASSISTANT

## 2022-07-25 PROCEDURE — 1159F PR MEDICATION LIST DOCUMENTED IN MEDICAL RECORD: ICD-10-PCS | Mod: CPTII,S$GLB,, | Performed by: PHYSICIAN ASSISTANT

## 2022-07-25 PROCEDURE — 3008F BODY MASS INDEX DOCD: CPT | Mod: CPTII,S$GLB,, | Performed by: PHYSICIAN ASSISTANT

## 2022-07-25 PROCEDURE — 3074F PR MOST RECENT SYSTOLIC BLOOD PRESSURE < 130 MM HG: ICD-10-PCS | Mod: CPTII,S$GLB,, | Performed by: PHYSICIAN ASSISTANT

## 2022-07-25 PROCEDURE — 3074F SYST BP LT 130 MM HG: CPT | Mod: CPTII,S$GLB,, | Performed by: PHYSICIAN ASSISTANT

## 2022-07-25 PROCEDURE — 99214 PR OFFICE/OUTPT VISIT, EST, LEVL IV, 30-39 MIN: ICD-10-PCS | Mod: S$GLB,,, | Performed by: PHYSICIAN ASSISTANT

## 2022-07-25 PROCEDURE — 80053 COMPREHEN METABOLIC PANEL: CPT | Performed by: PHYSICIAN ASSISTANT

## 2022-07-25 PROCEDURE — 1160F PR REVIEW ALL MEDS BY PRESCRIBER/CLIN PHARMACIST DOCUMENTED: ICD-10-PCS | Mod: CPTII,S$GLB,, | Performed by: PHYSICIAN ASSISTANT

## 2022-07-25 PROCEDURE — 99999 PR PBB SHADOW E&M-EST. PATIENT-LVL V: ICD-10-PCS | Mod: PBBFAC,,, | Performed by: PHYSICIAN ASSISTANT

## 2022-07-25 RX ORDER — HYDROXYZINE PAMOATE 50 MG/1
CAPSULE ORAL
Qty: 15 CAPSULE | Refills: 0 | Status: SHIPPED | OUTPATIENT
Start: 2022-07-25 | End: 2023-10-28

## 2022-07-25 RX ORDER — DIAZEPAM 5 MG/1
TABLET ORAL
Qty: 3 TABLET | Refills: 0 | Status: SHIPPED | OUTPATIENT
Start: 2022-07-25 | End: 2022-08-23
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
## 2022-07-25 NOTE — PATIENT INSTRUCTIONS
MRI brain and MRI C spine ordered  -pre procedure labs ordered   -valium written for claustrophobia, 1 pill 4 hours before MRI, second pill 30 mins before, no driving/causes sedation/secure a ride     Vistaril (hydroxyzine) increased to 50 mg, try 1 pill 3x a day for 2 days, rest left over every 8 hours as needed for bad headaches, no driving/causes sedation

## 2022-08-08 ENCOUNTER — PATIENT MESSAGE (OUTPATIENT)
Dept: NEUROLOGY | Facility: CLINIC | Age: 47
End: 2022-08-08
Payer: MEDICARE

## 2022-08-08 ENCOUNTER — HOSPITAL ENCOUNTER (OUTPATIENT)
Dept: RADIOLOGY | Facility: HOSPITAL | Age: 47
Discharge: HOME | End: 2022-08-08
Attending: PHYSICIAN ASSISTANT
Payer: MEDICARE

## 2022-08-08 ENCOUNTER — TELEPHONE (OUTPATIENT)
Dept: NEUROLOGY | Facility: CLINIC | Age: 47
End: 2022-08-08
Payer: MEDICARE

## 2022-08-08 DIAGNOSIS — M54.2 NECK PAIN: ICD-10-CM

## 2022-08-08 DIAGNOSIS — Z98.890 HISTORY OF NECK SURGERY: ICD-10-CM

## 2022-08-08 DIAGNOSIS — R51.9 WORSENING HEADACHES: ICD-10-CM

## 2022-08-08 DIAGNOSIS — G43.719 INTRACTABLE CHRONIC MIGRAINE WITHOUT AURA AND WITHOUT STATUS MIGRAINOSUS: ICD-10-CM

## 2022-08-08 PROCEDURE — 72141 MRI NECK SPINE W/O DYE: CPT | Mod: 26,,, | Performed by: RADIOLOGY

## 2022-08-08 PROCEDURE — 70553 MRI BRAIN STEM W/O & W/DYE: CPT | Mod: 26,,, | Performed by: RADIOLOGY

## 2022-08-08 PROCEDURE — 72141 MRI NECK SPINE W/O DYE: CPT | Mod: TC,PO

## 2022-08-08 PROCEDURE — 70553 MRI BRAIN STEM W/O & W/DYE: CPT | Mod: TC,PO

## 2022-08-08 PROCEDURE — 72141 MRI CERVICAL SPINE WITHOUT CONTRAST: ICD-10-PCS | Mod: 26,,, | Performed by: RADIOLOGY

## 2022-08-08 PROCEDURE — A9585 GADOBUTROL INJECTION: HCPCS | Mod: PO | Performed by: PHYSICIAN ASSISTANT

## 2022-08-08 PROCEDURE — 70553 MRI BRAIN W WO CONTRAST: ICD-10-PCS | Mod: 26,,, | Performed by: RADIOLOGY

## 2022-08-08 PROCEDURE — 25500020 PHARM REV CODE 255: Mod: PO | Performed by: PHYSICIAN ASSISTANT

## 2022-08-08 RX ORDER — GADOBUTROL 604.72 MG/ML
7 INJECTION INTRAVENOUS
Status: COMPLETED | OUTPATIENT
Start: 2022-08-08 | End: 2022-08-08

## 2022-08-08 RX ADMIN — GADOBUTROL 7 ML: 604.72 INJECTION INTRAVENOUS at 09:08

## 2022-08-08 NOTE — TELEPHONE ENCOUNTER
Called patient and notified of what Franc had written to her in the portal since patient not able to access. Will have BRISEYDA Bill call her tomorrow since he has already left for the day to discuss the MRI Brain instead of sending her a message. Verbalized understanding.

## 2022-08-08 NOTE — TELEPHONE ENCOUNTER
----- Message from Christiano Lopez sent at 8/8/2022  3:31 PM CDT -----  Regarding: test results  Type:  Test Results    Who Called:  Linda    Name of Test (Lab/Mammo/Etc):  MRI    Date of Test:  8/8/22 at 10am    Ordering Provider:  BRISEYDA Perez    Where the test was performed:  [facility]    Best Call Back Number:  [phone]     Additional Information:  pt does not have access to portal. Is aware if results not in today will get call back after provider as seen.

## 2022-08-09 ENCOUNTER — TELEPHONE (OUTPATIENT)
Dept: NEUROLOGY | Facility: CLINIC | Age: 47
End: 2022-08-09
Payer: MEDICARE

## 2022-08-09 NOTE — TELEPHONE ENCOUNTER
Call patient back, unable to leave message, wrote detailed response in a previous encounter (also dated today). JAGDEEP

## 2022-08-09 NOTE — TELEPHONE ENCOUNTER
Called patient, her voice mail is full/ phone not accepting calls. I called her back on [phone] (listed as home/mobile in chart).     Staff: if she calls back:    MRI brain doesn't show acute changes, however has old stroke in back of brain, I would like her to start a baby aspirin (81 mg). This is not likely cause of her headaches.    She does have some structural changes on her MRI of the C spine and I would recommend seeing pain management (I had referred her previously). I think this could be contributing to her headaches.       I had written to her extensively on the [employer], if she would like to look at the reports and see my comments, I would highly recommend she call the service desk to help recover her password.     JAGDEEP

## 2022-08-09 NOTE — TELEPHONE ENCOUNTER
----- Message from Christiano Lopez sent at 8/9/2022  8:24 AM CDT -----  Regarding: test results  Type:  Test Results    Who Called:  Linda    Name of Test (Lab/Mammo/Etc):  MRI    Date of Test:  8/8/22    Ordering Provider:  BRISEYDA Perez    Where the test was performed:  [facility]    Best Call Back Number:  [phone]     Additional Information:  pt said spoke to nurse yesterday and would get call from BRISEYDA Perez today.

## 2022-08-10 ENCOUNTER — TELEPHONE (OUTPATIENT)
Dept: NEUROLOGY | Facility: CLINIC | Age: 47
End: 2022-08-10
Payer: MEDICARE

## 2022-08-10 DIAGNOSIS — R11.0 NAUSEA: ICD-10-CM

## 2022-08-10 DIAGNOSIS — M54.2 NECK PAIN: ICD-10-CM

## 2022-08-10 DIAGNOSIS — M47.812 OSTEOARTHRITIS OF CERVICAL SPINE, UNSPECIFIED SPINAL OSTEOARTHRITIS COMPLICATION STATUS: Primary | ICD-10-CM

## 2022-08-10 RX ORDER — ONDANSETRON 8 MG/1
8 TABLET, ORALLY DISINTEGRATING ORAL EVERY 6 HOURS PRN
Qty: 30 TABLET | Refills: 1 | Status: SHIPPED | OUTPATIENT
Start: 2022-08-10 | End: 2022-08-23

## 2022-08-10 NOTE — TELEPHONE ENCOUNTER
Informed patient the pain management referral Marcelino Perez PA-C does not state a particular facility.  I will forward message to [facility] to review referral and call to schedule. Verbalized understanding.

## 2022-08-10 NOTE — TELEPHONE ENCOUNTER
----- Message from Christiano Lopez sent at 8/10/2022  4:43 PM CDT -----  Regarding: question  Type: Needs Medical Advice    Who Called:  Mariola    Keven Call Back Number: [phone]    Additional Information: pt was referred to pain management in [place]. Pt would like to stay on NS. Please call to discus.

## 2022-08-10 NOTE — TELEPHONE ENCOUNTER
----- Message from Argentina Villa MA sent at 8/10/2022  9:38 AM CDT -----  Contact: pt at [phone]    ----- Message -----  From: Xiang Perez  Sent: 8/10/2022   9:36 AM CDT  To: Ana Benson Staff    Type:  Patient Returning Call    Who Called:  pt  Who Left Message for Patient:  Marcelino  Does the patient know what this is regarding?:  yes  Best Call Back Number:  [phone]  Additional Information:  pt is very sorry for having missed the call from yesterday and would like to speak with the provider today if at all possible. The pt states that she will have her phone by her all day awaiting a new call in regards to her test results and the plan of action that comes next. . Please call back to advise.

## 2022-08-10 NOTE — TELEPHONE ENCOUNTER
Called patient back    She doesn't have equipment to access the portal.    DIscussed results of MRI of head and neck, discussed pineal cyst (offered referral to neurosurgery---discussed unlikely to be sole cause of her HA, she declined) and old lacunes in posterior circulation (start baby ASA 81 mg, she agreed).    I discussed c spine findings, and she agreed to see pain management.     States HA are bad, neck pain bad, feels light headed and nauseous.     I discussed no acute findings on her MRI, possible feeling light headed based on locations of old stroke, however, could be electrolyte changes, illness, cardiac, etc.... no acute findings on MRI brain.     She asked for refills of nausea medicine and I suggested she catch up with her PCP to ensure there is no primary GI issue causing her nausea. She states all questions answered and appreciated the phone call.     JAGDEEP

## 2022-08-11 ENCOUNTER — TELEPHONE (OUTPATIENT)
Dept: PAIN MEDICINE | Facility: CLINIC | Age: 47
End: 2022-08-11
Payer: MEDICARE

## 2022-08-11 NOTE — TELEPHONE ENCOUNTER
Spoke with patient and scheduled new patient appointment with provider closer to her residence. Referral from Neurology for neck pain. Patient voiced understanding regarding scheduled appointment date, time, phone number, and location.

## 2022-08-11 NOTE — TELEPHONE ENCOUNTER
Left voicemails with contacts for patient to return call regarding scheduling appointment for NS pain mgt. Unable to leave message with patient

## 2022-08-23 ENCOUNTER — OFFICE VISIT (OUTPATIENT)
Dept: URGENT CARE | Facility: CLINIC | Age: 47
End: 2022-08-23
Payer: MEDICARE

## 2022-08-23 VITALS
OXYGEN SATURATION: 99 % | HEART RATE: 62 BPM | SYSTOLIC BLOOD PRESSURE: 106 MMHG | RESPIRATION RATE: 17 BRPM | DIASTOLIC BLOOD PRESSURE: 65 MMHG | BODY MASS INDEX: 25.71 KG/M2 | WEIGHT: 169.63 LBS | TEMPERATURE: 98 F | HEIGHT: 68 IN

## 2022-08-23 DIAGNOSIS — N89.8 VAGINAL ITCHING: ICD-10-CM

## 2022-08-23 DIAGNOSIS — R10.9 ABDOMINAL PAIN, UNSPECIFIED ABDOMINAL LOCATION: ICD-10-CM

## 2022-08-23 DIAGNOSIS — R52 BODY ACHES: ICD-10-CM

## 2022-08-23 DIAGNOSIS — R30.0 DYSURIA: Primary | ICD-10-CM

## 2022-08-23 LAB
BILIRUB UR QL STRIP: NEGATIVE
CTP QC/QA: YES
CTP QC/QA: YES
FLUAV AG NPH QL: NEGATIVE
FLUBV AG NPH QL: NEGATIVE
GLUCOSE UR QL STRIP: NEGATIVE
KETONES UR QL STRIP: NEGATIVE
LEUKOCYTE ESTERASE UR QL STRIP: NEGATIVE
PH, POC UA: 5.5
POC BLOOD, URINE: NEGATIVE
POC NITRATES, URINE: NEGATIVE
PROT UR QL STRIP: NEGATIVE
SARS-COV-2 AG RESP QL IA.RAPID: NEGATIVE
SP GR UR STRIP: 1.01 (ref 1–1.03)
UROBILINOGEN UR STRIP-ACNC: NORMAL (ref 0.1–1.1)

## 2022-08-23 PROCEDURE — 1159F MED LIST DOCD IN RCRD: CPT | Mod: CPTII,S$GLB,,

## 2022-08-23 PROCEDURE — 3078F PR MOST RECENT DIASTOLIC BLOOD PRESSURE < 80 MM HG: ICD-10-PCS | Mod: CPTII,S$GLB,,

## 2022-08-23 PROCEDURE — 87804 INFLUENZA ASSAY W/OPTIC: CPT | Mod: QW,,,

## 2022-08-23 PROCEDURE — 3074F PR MOST RECENT SYSTOLIC BLOOD PRESSURE < 130 MM HG: ICD-10-PCS | Mod: CPTII,S$GLB,,

## 2022-08-23 PROCEDURE — 87811 SARS-COV-2 COVID19 W/OPTIC: CPT | Mod: QW,S$GLB,,

## 2022-08-23 PROCEDURE — 3008F PR BODY MASS INDEX (BMI) DOCUMENTED: ICD-10-PCS | Mod: CPTII,S$GLB,,

## 2022-08-23 PROCEDURE — 1159F PR MEDICATION LIST DOCUMENTED IN MEDICAL RECORD: ICD-10-PCS | Mod: CPTII,S$GLB,,

## 2022-08-23 PROCEDURE — 81003 POCT URINALYSIS, DIPSTICK, AUTOMATED, W/O SCOPE: ICD-10-PCS | Mod: QW,S$GLB,,

## 2022-08-23 PROCEDURE — 3008F BODY MASS INDEX DOCD: CPT | Mod: CPTII,S$GLB,,

## 2022-08-23 PROCEDURE — 99214 OFFICE O/P EST MOD 30 MIN: CPT | Mod: 25,S$GLB,,

## 2022-08-23 PROCEDURE — 81003 URINALYSIS AUTO W/O SCOPE: CPT | Mod: QW,S$GLB,,

## 2022-08-23 PROCEDURE — 99214 PR OFFICE/OUTPT VISIT, EST, LEVL IV, 30-39 MIN: ICD-10-PCS | Mod: 25,S$GLB,,

## 2022-08-23 PROCEDURE — 87811 SARS CORONAVIRUS 2 ANTIGEN POCT, MANUAL READ: ICD-10-PCS | Mod: QW,S$GLB,,

## 2022-08-23 PROCEDURE — 3078F DIAST BP <80 MM HG: CPT | Mod: CPTII,S$GLB,,

## 2022-08-23 PROCEDURE — 87804 POCT INFLUENZA A/B: ICD-10-PCS | Mod: 59,QW,,

## 2022-08-23 PROCEDURE — 3074F SYST BP LT 130 MM HG: CPT | Mod: CPTII,S$GLB,,

## 2022-08-23 RX ORDER — FLUCONAZOLE 150 MG/1
150 TABLET ORAL
Qty: 2 TABLET | Refills: 0 | Status: SHIPPED | OUTPATIENT
Start: 2022-08-23 | End: 2022-08-25

## 2022-08-23 NOTE — PROGRESS NOTES
"Subjective:       Patient ID: Linda Sullivan is a 47 y.o. female.    Vitals:  height is 5' 8" (1.727 m) and weight is 76.9 kg (169 lb 9.6 oz). Her oral temperature is 98.4 °F (36.9 °C). Her blood pressure is 106/65 and her pulse is 62. Her respiration is 17 and oxygen saturation is 99%.     Chief Complaint: Generalized Body Aches    Pt states she feels ill "feels fluish" , body aches,and sinus congestion   Burning and a little itching when urinating started  All symptoms started 3 days ago   Took aspirin in the morning       Constitution: Positive for chills. Negative for activity change, appetite change, sweating and fever.   HENT: Negative for ear pain, sinus pain, sinus pressure and sore throat.    Neck: Negative for neck pain.   Cardiovascular: Negative for chest pain.   Eyes: Negative for blurred vision.   Respiratory: Negative for chest tightness, cough and shortness of breath.    Gastrointestinal: Negative for abdominal pain.   Genitourinary: Positive for dysuria. Negative for frequency, urgency, flank pain, vaginal discharge, vaginal bleeding and pelvic pain.        Vaginal itching     Neurological: Negative for dizziness and history of vertigo.       Objective:      Physical Exam   Constitutional: She is oriented to person, place, and time.  Non-toxic appearance. She does not appear ill. No distress.   HENT:   Head: Normocephalic.   Nose: Nose normal.   Eyes: Conjunctivae are normal. Extraocular movement intact   Cardiovascular: Normal rate, normal heart sounds and normal pulses.   Pulmonary/Chest: Effort normal and breath sounds normal. No respiratory distress.   Abdominal: Soft. There is no abdominal tenderness. There is no guarding, no left CVA tenderness and no right CVA tenderness.   Neurological: no focal deficit. She is alert and oriented to person, place, and time.   Skin: Skin is warm, dry and not diaphoretic. Capillary refill takes 2 to 3 seconds.   Psychiatric: Her behavior is normal. Mood " normal.         Assessment:       1. Dysuria    2. Body aches    3. Vaginal itching    4. Abdominal pain, unspecified abdominal location          Plan:         Dysuria  -     POCT Urinalysis, Dipstick, Automated, W/O Scope  -     NuSwab Vaginitis Plus (VG+)    Body aches  -     SARS Coronavirus 2 Antigen, POCT Manual Read  -     POCT Influenza A/B    Vaginal itching  -     fluconazole (DIFLUCAN) 150 MG Tab; Take 1 tablet (150 mg total) by mouth every 72 hours. for 2 days  Dispense: 2 tablet; Refill: 0    Abdominal pain, unspecified abdominal location         Patient presents with vaginal irritation that is described as itching, reports she has frequent yeast infections, reports symptoms are consistent with previous episodes. Denies any vaginal discharge. Patient also having slight dysuria. UA is unremarkable. Patient having lower abdominal pain on left and right side, reports 4/10. Patient is tolerating po liquids and solids. Denies fever, vomiting, cp or sob.     Patient informed the need to go to the ER for abdominal pain for further imaging if worsen, informed patient by not going could lead to worsening of symptoms and or death. Patient verbalized understanding.     Nuswab collected, will treat empirically for yeast infection. Denies chance of std, pmhx hysterectomy. Patient informed to fu with obgyn if symptoms persist.     Covid/flu negative, body aches high suspicion for viral etiology.

## 2022-08-23 NOTE — PATIENT INSTRUCTIONS
"We will notify you with Nu-swab results within one week.     If symptoms persist and not resolving follow up with your obgyn.     Increase fluids. Get plenty of rest.     If you develop worsening of symptoms, abdominal pain, vomiting that is not relieved, fever not lowered with OTC medication, go straight to the emergency room for further work up and evaluation.     Start BRAT diet " Bananas, rice, applesauce, toast". Increase hydration, use supplements like Pedialyte or Gatorade. Ensure you consume 8, 8oz glasses of water per day.   "

## 2022-08-26 LAB
A VAGINAE DNA VAG QL NAA+PROBE: NORMAL SCORE
BVAB2 DNA VAG QL NAA+PROBE: NORMAL SCORE
C ALBICANS DNA VAG QL NAA+PROBE: NEGATIVE
C GLABRATA DNA VAG QL NAA+PROBE: NEGATIVE
C TRACH DNA VAG QL NAA+PROBE: NEGATIVE
MEGA1 DNA VAG QL NAA+PROBE: NORMAL SCORE
N GONORRHOEA DNA VAG QL NAA+PROBE: NEGATIVE
T VAGINALIS DNA VAG QL NAA+PROBE: NEGATIVE

## 2022-08-30 ENCOUNTER — TELEPHONE (OUTPATIENT)
Dept: URGENT CARE | Facility: CLINIC | Age: 47
End: 2022-08-30
Payer: MEDICARE

## 2022-08-30 NOTE — TELEPHONE ENCOUNTER
----- Message from Lindesy Duff NP sent at 8/26/2022  3:50 PM CDT -----  Please notify that vaginal swab came back negative for BV, yeast, GC, chlamydia, and trich.

## 2022-09-22 ENCOUNTER — PROCEDURE VISIT (OUTPATIENT)
Dept: NEUROLOGY | Facility: CLINIC | Age: 47
End: 2022-09-22
Payer: MEDICARE

## 2022-09-22 VITALS
HEART RATE: 81 BPM | RESPIRATION RATE: 17 BRPM | BODY MASS INDEX: 27.65 KG/M2 | SYSTOLIC BLOOD PRESSURE: 116 MMHG | DIASTOLIC BLOOD PRESSURE: 79 MMHG | HEIGHT: 68 IN | WEIGHT: 182.44 LBS

## 2022-09-22 DIAGNOSIS — G43.719 INTRACTABLE CHRONIC MIGRAINE WITHOUT AURA AND WITHOUT STATUS MIGRAINOSUS: Primary | ICD-10-CM

## 2022-09-22 DIAGNOSIS — R93.89 ABNORMAL MRI: ICD-10-CM

## 2022-09-22 DIAGNOSIS — Z71.6 ENCOUNTER FOR TOBACCO USE CESSATION COUNSELING: ICD-10-CM

## 2022-09-22 DIAGNOSIS — R41.89 SUBJECTIVE MEMORY COMPLAINTS: ICD-10-CM

## 2022-09-22 PROCEDURE — 64615 PR CHEMODENERVATION OF MUSCLE FOR CHRONIC MIGRAINE: ICD-10-PCS | Mod: S$GLB,,, | Performed by: PHYSICIAN ASSISTANT

## 2022-09-22 PROCEDURE — 99212 OFFICE O/P EST SF 10 MIN: CPT | Mod: 25,S$GLB,, | Performed by: PHYSICIAN ASSISTANT

## 2022-09-22 PROCEDURE — 99212 PR OFFICE/OUTPT VISIT, EST, LEVL II, 10-19 MIN: ICD-10-PCS | Mod: 25,S$GLB,, | Performed by: PHYSICIAN ASSISTANT

## 2022-09-22 PROCEDURE — 64615 CHEMODENERV MUSC MIGRAINE: CPT | Mod: S$GLB,,, | Performed by: PHYSICIAN ASSISTANT

## 2022-09-22 RX ORDER — ASPIRIN 81 MG/1
81 TABLET ORAL DAILY
COMMUNITY

## 2022-09-22 NOTE — PROCEDURES
Procedures  [facility]  [address] [place] [address]  Phone:[phone]  Fax: [phone]  Botox Visit, #2    Chief Complaint   Patient presents with    Botulinum Toxin Injection         A/P:        ICD-10-CM ICD-9-CM   1. Intractable chronic migraine without aura and without status migrainosus  G43.719 346.71   2. Encounter for tobacco use cessation counseling  Z71.6 V65.42   3. Subjective memory complaints  R41.89 780.93   4. Abnormal MRI  R93.89 793.99     Botox for migraine    PROCEDURE NOTE:  BOTOX injection is indicated for the prophylaxis of headaches in adult patients with chronic  migraine. Patient meets indications for BOTOX therapy.  Potential risks and benefits were reviewed. Side effects including, but not limited to, potential  systemic allergic reactions of the anaphylactic type as well as local injection site reactions of  blepharoptosis, diplopia, infection, bleeding, pain, redness and bruising were reviewed. The  potential for headaches and/or neck pain post procedure were reviewed.  The patient's questions were answered. The patient signed a consent form. Patient  understands that depending on their insurance carrier, there may be a copay for this treatment.  BOTOX was reconstituted using  two 100 unit vials and diluted with 4 mL of sterile saline.  BOTOX was injected as per the PREEMPT trial injection paradigm with dose administered as 5  unit intramuscular (IM) injections per site using a sterile, 30-gauge 0.5 inch needle as follows:  Muscle Dose, # of Sites   10 units divided in 2 sites  Procerus 5 units in 1 site  Frontalis 20 units divided in 4 sites  Temporalis 40 units divided in 8 sites  Occipitalis 30 units divided in 6 sites  Cervical paraspinal 20 units divided in 4 sites  Trapezius 30 units divided in 6 sites  Each site was cleaned with alcohol prior to injection. A total dose of 155 units were injected.  "45  units were discarded/wasted.  The patient tolerated the procedure well with no immediate complications.  MEDICATION INFO:  NDC 6817-0198-58   Lot # [id number]  Exp 08/2024      As aside:  She asks to discuss her MRI, we went over in toto. I showed her the regions in question, the pineal and posterior region of the brain. We discussed stroke at length, and we discussed goals are to reduce more from happening in future.      Antiplatelet/anticoagulant instruction:ASA 81 mg   Work with PCP to ensure FBS<100, LDL<70 mg/dL and BP<120/80        -obtain BP cuff if not already owned   Stop smoking (we discussed stepwise tobacco cessation at length) and I ordered referral to smoke cessation program   If signs of stroke (e.g., worst headache of life, sudden vision change, numbness/weakness one side of the body, facial droop, speech change or confusion), call 911.    8/8/2022 MRI Brain (report and reviewed with patient): 1.  There is no acute abnormality.  There is no hemorrhage, mass/mass effect, acute infarction.  There is no pathologic enhancement.   2.  There are several tiny remote lacunar infarctions in the cerebellar hemispheres.   3.  Postsurgical and/or developmental changes at the craniovertebral junction could be further evaluated with CT.   4.  Small incidental simple appearing pineal cyst.         We discussed the incidental pineal cyst. I suggested we can repeat imaging in 6 months or I can refer her to neurosurgery for opinion. I discussed this is an incidental finding and not the sole cause of her HA. She would like to hold off from neurosurgery and repeat imaging.     She has concerns about memory changes, feels they are worsening (also discussed extreme anxiety, she is followed by mental health and states she is safe). I have placed referral to formalized neuropsychology testing. I mentioned stress, anxiety, depression, lack of sleep and some of her medications along with a feeling of being "unwell " "generally" can all factor into memory changes. She states no trouble with ADLs. She voiced appreciation for referral.     She will be seeing pain management for opinion  about her other pains tomorrow       I have also reviewed her allergies/meds and PMHx/Fhx/Shx, all are up to date. J     Follow up in 3 months for repeat injection       Marcelino Perez MPA, PA-C  Attending available-Dr Maciel MD           Personal Protective Equipment:    Personal Protective Equipment was used during this encounter including;    mask-surgical, and non latex gloves.     09/22/2022 8:46 AM    CC: Rosy Metcalf MD    Approx 15 mins of discussion/answering questions in addition to the botox visit       "

## 2022-09-22 NOTE — PATIENT INSTRUCTIONS
If signs of stroke (e.g., worst headache of life, sudden vision change, numbness/weakness one side of the body, facial droop, speech change or confusion), call 911.    Please take aspirin every day (81 mg)  Consider buying a BP cuff, ideals (systolic/top number) >100 but <130   Slowly cut back on tobacco, I also ordered the referral for the class    Memory is a concern for you, I have ordered formalized testing

## 2022-09-23 ENCOUNTER — OFFICE VISIT (OUTPATIENT)
Dept: PAIN MEDICINE | Facility: CLINIC | Age: 47
End: 2022-09-23
Payer: MEDICARE

## 2022-09-23 VITALS — WEIGHT: 182 LBS | BODY MASS INDEX: 27.58 KG/M2 | HEIGHT: 68 IN

## 2022-09-23 DIAGNOSIS — G24.3 CERVICAL DYSTONIA: ICD-10-CM

## 2022-09-23 DIAGNOSIS — G43.119 INTRACTABLE MIGRAINE WITH AURA WITHOUT STATUS MIGRAINOSUS: ICD-10-CM

## 2022-09-23 DIAGNOSIS — M50.30 DDD (DEGENERATIVE DISC DISEASE), CERVICAL: Primary | ICD-10-CM

## 2022-09-23 DIAGNOSIS — M54.2 NECK PAIN: ICD-10-CM

## 2022-09-23 DIAGNOSIS — Z98.1 S/P CERVICAL SPINAL FUSION: ICD-10-CM

## 2022-09-23 DIAGNOSIS — M47.812 OSTEOARTHRITIS OF CERVICAL SPINE, UNSPECIFIED SPINAL OSTEOARTHRITIS COMPLICATION STATUS: ICD-10-CM

## 2022-09-23 PROCEDURE — 99999 PR PBB SHADOW E&M-EST. PATIENT-LVL IV: CPT | Mod: PBBFAC,,, | Performed by: ANESTHESIOLOGY

## 2022-09-23 PROCEDURE — 1159F PR MEDICATION LIST DOCUMENTED IN MEDICAL RECORD: ICD-10-PCS | Mod: CPTII,S$GLB,, | Performed by: ANESTHESIOLOGY

## 2022-09-23 PROCEDURE — 3008F PR BODY MASS INDEX (BMI) DOCUMENTED: ICD-10-PCS | Mod: CPTII,S$GLB,, | Performed by: ANESTHESIOLOGY

## 2022-09-23 PROCEDURE — 99204 OFFICE O/P NEW MOD 45 MIN: CPT | Mod: 25,S$GLB,, | Performed by: ANESTHESIOLOGY

## 2022-09-23 PROCEDURE — 96372 PR INJECTION,THERAP/PROPH/DIAG2ST, IM OR SUBCUT: ICD-10-PCS | Mod: S$GLB,,, | Performed by: ANESTHESIOLOGY

## 2022-09-23 PROCEDURE — 96372 THER/PROPH/DIAG INJ SC/IM: CPT | Mod: S$GLB,,, | Performed by: ANESTHESIOLOGY

## 2022-09-23 PROCEDURE — 99999 PR PBB SHADOW E&M-EST. PATIENT-LVL IV: ICD-10-PCS | Mod: PBBFAC,,, | Performed by: ANESTHESIOLOGY

## 2022-09-23 PROCEDURE — 99204 PR OFFICE/OUTPT VISIT, NEW, LEVL IV, 45-59 MIN: ICD-10-PCS | Mod: 25,S$GLB,, | Performed by: ANESTHESIOLOGY

## 2022-09-23 PROCEDURE — 1159F MED LIST DOCD IN RCRD: CPT | Mod: CPTII,S$GLB,, | Performed by: ANESTHESIOLOGY

## 2022-09-23 PROCEDURE — 3008F BODY MASS INDEX DOCD: CPT | Mod: CPTII,S$GLB,, | Performed by: ANESTHESIOLOGY

## 2022-09-23 RX ORDER — GABAPENTIN 800 MG/1
800 TABLET ORAL 3 TIMES DAILY
Qty: 90 TABLET | Refills: 2 | Status: SHIPPED | OUTPATIENT
Start: 2022-09-23 | End: 2022-12-22

## 2022-09-23 RX ORDER — KETOROLAC TROMETHAMINE 30 MG/ML
30 INJECTION, SOLUTION INTRAMUSCULAR; INTRAVENOUS ONCE
Status: COMPLETED | OUTPATIENT
Start: 2022-09-23 | End: 2022-09-23

## 2022-09-23 RX ADMIN — KETOROLAC TROMETHAMINE 30 MG: 30 INJECTION, SOLUTION INTRAMUSCULAR; INTRAVENOUS at 12:09
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
## 2022-10-25 ENCOUNTER — OFFICE VISIT (OUTPATIENT)
Dept: PAIN MEDICINE | Facility: CLINIC | Age: 47
End: 2022-10-25
Payer: MEDICARE

## 2022-10-25 VITALS
SYSTOLIC BLOOD PRESSURE: 116 MMHG | HEART RATE: 87 BPM | HEIGHT: 68 IN | DIASTOLIC BLOOD PRESSURE: 75 MMHG | BODY MASS INDEX: 27.6 KG/M2 | WEIGHT: 182.13 LBS | RESPIRATION RATE: 16 BRPM

## 2022-10-25 DIAGNOSIS — G24.3 CERVICAL DYSTONIA: Primary | ICD-10-CM

## 2022-10-25 DIAGNOSIS — G43.119 INTRACTABLE MIGRAINE WITH AURA WITHOUT STATUS MIGRAINOSUS: ICD-10-CM

## 2022-10-25 DIAGNOSIS — M47.812 OSTEOARTHRITIS OF CERVICAL SPINE, UNSPECIFIED SPINAL OSTEOARTHRITIS COMPLICATION STATUS: ICD-10-CM

## 2022-10-25 DIAGNOSIS — M50.30 DDD (DEGENERATIVE DISC DISEASE), CERVICAL: ICD-10-CM

## 2022-10-25 PROCEDURE — 96372 THER/PROPH/DIAG INJ SC/IM: CPT | Mod: S$GLB,,, | Performed by: ANESTHESIOLOGY

## 2022-10-25 PROCEDURE — 99999 PR PBB SHADOW E&M-EST. PATIENT-LVL IV: ICD-10-PCS | Mod: PBBFAC,,, | Performed by: ANESTHESIOLOGY

## 2022-10-25 PROCEDURE — 99214 OFFICE O/P EST MOD 30 MIN: CPT | Mod: 25,S$GLB,, | Performed by: ANESTHESIOLOGY

## 2022-10-25 PROCEDURE — 3078F PR MOST RECENT DIASTOLIC BLOOD PRESSURE < 80 MM HG: ICD-10-PCS | Mod: CPTII,S$GLB,, | Performed by: ANESTHESIOLOGY

## 2022-10-25 PROCEDURE — 1159F MED LIST DOCD IN RCRD: CPT | Mod: CPTII,S$GLB,, | Performed by: ANESTHESIOLOGY

## 2022-10-25 PROCEDURE — 99214 PR OFFICE/OUTPT VISIT, EST, LEVL IV, 30-39 MIN: ICD-10-PCS | Mod: 25,S$GLB,, | Performed by: ANESTHESIOLOGY

## 2022-10-25 PROCEDURE — 3078F DIAST BP <80 MM HG: CPT | Mod: CPTII,S$GLB,, | Performed by: ANESTHESIOLOGY

## 2022-10-25 PROCEDURE — 96372 PR INJECTION,THERAP/PROPH/DIAG2ST, IM OR SUBCUT: ICD-10-PCS | Mod: S$GLB,,, | Performed by: ANESTHESIOLOGY

## 2022-10-25 PROCEDURE — 3074F SYST BP LT 130 MM HG: CPT | Mod: CPTII,S$GLB,, | Performed by: ANESTHESIOLOGY

## 2022-10-25 PROCEDURE — 99999 PR PBB SHADOW E&M-EST. PATIENT-LVL IV: CPT | Mod: PBBFAC,,, | Performed by: ANESTHESIOLOGY

## 2022-10-25 PROCEDURE — 1159F PR MEDICATION LIST DOCUMENTED IN MEDICAL RECORD: ICD-10-PCS | Mod: CPTII,S$GLB,, | Performed by: ANESTHESIOLOGY

## 2022-10-25 PROCEDURE — 3074F PR MOST RECENT SYSTOLIC BLOOD PRESSURE < 130 MM HG: ICD-10-PCS | Mod: CPTII,S$GLB,, | Performed by: ANESTHESIOLOGY

## 2022-10-25 RX ORDER — KETOROLAC TROMETHAMINE 30 MG/ML
30 INJECTION, SOLUTION INTRAMUSCULAR; INTRAVENOUS ONCE
Status: COMPLETED | OUTPATIENT
Start: 2022-10-25 | End: 2022-10-25

## 2022-10-25 RX ORDER — CELECOXIB 100 MG/1
100 CAPSULE ORAL 2 TIMES DAILY PRN
Qty: 60 CAPSULE | Refills: 0 | Status: SHIPPED | OUTPATIENT
Start: 2022-10-25 | End: 2022-11-29

## 2022-10-25 RX ORDER — TOPIRAMATE 200 MG/1
200 TABLET ORAL DAILY
COMMUNITY

## 2022-10-25 RX ADMIN — KETOROLAC TROMETHAMINE 30 MG: 30 INJECTION, SOLUTION INTRAMUSCULAR; INTRAVENOUS at 03:10
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
## 2022-11-04 ENCOUNTER — OFFICE VISIT (OUTPATIENT)
Dept: ENDOCRINOLOGY | Facility: CLINIC | Age: 47
End: 2022-11-04
Payer: MEDICARE

## 2022-11-04 ENCOUNTER — LAB VISIT (OUTPATIENT)
Dept: LAB | Facility: HOSPITAL | Age: 47
End: 2022-11-04
Attending: PHYSICIAN ASSISTANT
Payer: MEDICARE

## 2022-11-04 VITALS
OXYGEN SATURATION: 97 % | HEART RATE: 89 BPM | TEMPERATURE: 98 F | DIASTOLIC BLOOD PRESSURE: 80 MMHG | SYSTOLIC BLOOD PRESSURE: 112 MMHG | BODY MASS INDEX: 28.87 KG/M2 | WEIGHT: 190.5 LBS | HEIGHT: 68 IN

## 2022-11-04 DIAGNOSIS — E16.2 HYPOGLYCEMIA: Primary | ICD-10-CM

## 2022-11-04 DIAGNOSIS — R53.83 FATIGUE, UNSPECIFIED TYPE: ICD-10-CM

## 2022-11-04 LAB
ALBUMIN SERPL BCP-MCNC: 4.1 G/DL (ref 3.5–5.2)
ALP SERPL-CCNC: 34 U/L (ref 55–135)
ALT SERPL W/O P-5'-P-CCNC: 13 U/L (ref 10–44)
ANION GAP SERPL CALC-SCNC: 9 MMOL/L (ref 8–16)
AST SERPL-CCNC: 15 U/L (ref 10–40)
BASOPHILS # BLD AUTO: 0.03 K/UL (ref 0–0.2)
BASOPHILS NFR BLD: 0.5 % (ref 0–1.9)
BILIRUB SERPL-MCNC: 0.5 MG/DL (ref 0.1–1)
BUN SERPL-MCNC: 9 MG/DL (ref 6–20)
CALCIUM SERPL-MCNC: 9.1 MG/DL (ref 8.7–10.5)
CHLORIDE SERPL-SCNC: 113 MMOL/L (ref 95–110)
CO2 SERPL-SCNC: 19 MMOL/L (ref 23–29)
CREAT SERPL-MCNC: 0.8 MG/DL (ref 0.5–1.4)
DIFFERENTIAL METHOD: ABNORMAL
EOSINOPHIL # BLD AUTO: 0 K/UL (ref 0–0.5)
EOSINOPHIL NFR BLD: 0.5 % (ref 0–8)
ERYTHROCYTE [DISTWIDTH] IN BLOOD BY AUTOMATED COUNT: 12.4 % (ref 11.5–14.5)
EST. GFR  (NO RACE VARIABLE): >60 ML/MIN/1.73 M^2
GLUCOSE SERPL-MCNC: 96 MG/DL (ref 70–110)
GLUCOSE SERPL-MCNC: 99 MG/DL (ref 70–110)
HCT VFR BLD AUTO: 40.3 % (ref 37–48.5)
HGB BLD-MCNC: 13.7 G/DL (ref 12–16)
IMM GRANULOCYTES # BLD AUTO: 0.01 K/UL (ref 0–0.04)
IMM GRANULOCYTES NFR BLD AUTO: 0.2 % (ref 0–0.5)
LYMPHOCYTES # BLD AUTO: 1.6 K/UL (ref 1–4.8)
LYMPHOCYTES NFR BLD: 25.2 % (ref 18–48)
MCH RBC QN AUTO: 32.9 PG (ref 27–31)
MCHC RBC AUTO-ENTMCNC: 34 G/DL (ref 32–36)
MCV RBC AUTO: 97 FL (ref 82–98)
MONOCYTES # BLD AUTO: 0.5 K/UL (ref 0.3–1)
MONOCYTES NFR BLD: 8.2 % (ref 4–15)
NEUTROPHILS # BLD AUTO: 4.1 K/UL (ref 1.8–7.7)
NEUTROPHILS NFR BLD: 65.4 % (ref 38–73)
NRBC BLD-RTO: 0 /100 WBC
PLATELET # BLD AUTO: 235 K/UL (ref 150–450)
PMV BLD AUTO: 11 FL (ref 9.2–12.9)
POTASSIUM SERPL-SCNC: 4.3 MMOL/L (ref 3.5–5.1)
PROT SERPL-MCNC: 7.2 G/DL (ref 6–8.4)
RBC # BLD AUTO: 4.17 M/UL (ref 4–5.4)
SODIUM SERPL-SCNC: 141 MMOL/L (ref 136–145)
T4 FREE SERPL-MCNC: 0.76 NG/DL (ref 0.71–1.51)
TSH SERPL DL<=0.005 MIU/L-ACNC: 1.71 UIU/ML (ref 0.4–4)
WBC # BLD AUTO: 6.24 K/UL (ref 3.9–12.7)

## 2022-11-04 PROCEDURE — 3074F PR MOST RECENT SYSTOLIC BLOOD PRESSURE < 130 MM HG: ICD-10-PCS | Mod: CPTII,S$GLB,, | Performed by: PHYSICIAN ASSISTANT

## 2022-11-04 PROCEDURE — 82962 POCT GLUCOSE, HAND-HELD DEVICE: ICD-10-PCS | Mod: S$GLB,,, | Performed by: PHYSICIAN ASSISTANT

## 2022-11-04 PROCEDURE — 84443 ASSAY THYROID STIM HORMONE: CPT | Performed by: PHYSICIAN ASSISTANT

## 2022-11-04 PROCEDURE — 80053 COMPREHEN METABOLIC PANEL: CPT | Performed by: PHYSICIAN ASSISTANT

## 2022-11-04 PROCEDURE — 3008F BODY MASS INDEX DOCD: CPT | Mod: CPTII,S$GLB,, | Performed by: PHYSICIAN ASSISTANT

## 2022-11-04 PROCEDURE — 99999 PR PBB SHADOW E&M-EST. PATIENT-LVL III: ICD-10-PCS | Mod: PBBFAC,,, | Performed by: PHYSICIAN ASSISTANT

## 2022-11-04 PROCEDURE — 82962 GLUCOSE BLOOD TEST: CPT | Mod: S$GLB,,, | Performed by: PHYSICIAN ASSISTANT

## 2022-11-04 PROCEDURE — 3079F PR MOST RECENT DIASTOLIC BLOOD PRESSURE 80-89 MM HG: ICD-10-PCS | Mod: CPTII,S$GLB,, | Performed by: PHYSICIAN ASSISTANT

## 2022-11-04 PROCEDURE — 99999 PR PBB SHADOW E&M-EST. PATIENT-LVL III: CPT | Mod: PBBFAC,,, | Performed by: PHYSICIAN ASSISTANT

## 2022-11-04 PROCEDURE — 85025 COMPLETE CBC W/AUTO DIFF WBC: CPT | Performed by: PHYSICIAN ASSISTANT

## 2022-11-04 PROCEDURE — 99213 PR OFFICE/OUTPT VISIT, EST, LEVL III, 20-29 MIN: ICD-10-PCS | Mod: S$GLB,,, | Performed by: PHYSICIAN ASSISTANT

## 2022-11-04 PROCEDURE — 1159F MED LIST DOCD IN RCRD: CPT | Mod: CPTII,S$GLB,, | Performed by: PHYSICIAN ASSISTANT

## 2022-11-04 PROCEDURE — 36415 COLL VENOUS BLD VENIPUNCTURE: CPT | Mod: PO | Performed by: PHYSICIAN ASSISTANT

## 2022-11-04 PROCEDURE — 3008F PR BODY MASS INDEX (BMI) DOCUMENTED: ICD-10-PCS | Mod: CPTII,S$GLB,, | Performed by: PHYSICIAN ASSISTANT

## 2022-11-04 PROCEDURE — 3074F SYST BP LT 130 MM HG: CPT | Mod: CPTII,S$GLB,, | Performed by: PHYSICIAN ASSISTANT

## 2022-11-04 PROCEDURE — 84439 ASSAY OF FREE THYROXINE: CPT | Performed by: PHYSICIAN ASSISTANT

## 2022-11-04 PROCEDURE — 99213 OFFICE O/P EST LOW 20 MIN: CPT | Mod: S$GLB,,, | Performed by: PHYSICIAN ASSISTANT

## 2022-11-04 PROCEDURE — 1160F RVW MEDS BY RX/DR IN RCRD: CPT | Mod: CPTII,S$GLB,, | Performed by: PHYSICIAN ASSISTANT

## 2022-11-04 PROCEDURE — 1159F PR MEDICATION LIST DOCUMENTED IN MEDICAL RECORD: ICD-10-PCS | Mod: CPTII,S$GLB,, | Performed by: PHYSICIAN ASSISTANT

## 2022-11-04 PROCEDURE — 3079F DIAST BP 80-89 MM HG: CPT | Mod: CPTII,S$GLB,, | Performed by: PHYSICIAN ASSISTANT

## 2022-11-04 PROCEDURE — 1160F PR REVIEW ALL MEDS BY PRESCRIBER/CLIN PHARMACIST DOCUMENTED: ICD-10-PCS | Mod: CPTII,S$GLB,, | Performed by: PHYSICIAN ASSISTANT

## 2022-11-04 NOTE — PROGRESS NOTES
"CC: Hypoglycemia    HPI: Linda Sullivan is a 47 y.o. female here for hypoglycemia along with pending conditions listed in the Visit Diagnosis.      Reports episodes of dizziness, nausea, tremors. Dx in HS. Reports episodes occur multiple times during the day. She will also eat oranges and veggie sticks. + polydipsia, polyuria. She drinks coke zero all day. Reports glucoses in 70-90s. Reports feeling jittery. She had three cups of coffee this morning. Glucose in clinic was 96. Pt states glucose was in the 200s a few times after eating an orange. Reports wt gain. 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 She stopped working in 8/22 and has been less active. Reports decreased memory and fatigue.     Diet:  BF-eggs, toast  LH-2 pieces of pizza  DN-apple, nectirines, veggie chips  Drinks coffee, gatorade.     She has three children. Hx of partial hysterectomy.    PMHx, PSHx: reviewed in epic.    Social Hx: no ETOH use. Smokes 4 packs weekly since she was 11 yo.    Wt Readings from Last 6 Encounters:   11/04/22 86.4 kg (190 lb 7.6 oz)   10/25/22 82.6 kg (182 lb 1.6 oz)   09/23/22 82.6 kg (182 lb)   09/22/22 82.7 kg (182 lb 6.9 oz)   08/23/22 76.9 kg (169 lb 9.6 oz)   07/25/22 74.6 kg (164 lb 5.7 oz)      ROS:   Constitutional: + wt gain 20 lbs  Eyes: No recent visual changes  Cardiovascular: Denies current anginal symptoms  Respiratory: Denies current respiratory difficulty  Gastrointestinal: Denies recent bowel disturbances  GenitoUrinary - No dysuria  Skin: No new skin rash  Neurologic: No focal neurologic complaints  Musculoskeletal: no joint pain  Endocrine: no polyphagia, polydipsia or polyuria  Remainder ROS negative     /80 (BP Location: Left arm, Patient Position: Sitting, BP Method: Small (Manual))   Pulse 89   Temp 98.3 °F (36.8 °C) (Oral)   Ht 5' 8" (1.727 m)   Wt 86.4 kg (190 lb 7.6 oz)   SpO2 97%   BMI 28.96 kg/m²      Personally reviewed labs below:    Lab Results   Component Value Date    " TSH 2.404 12/30/2021    C9GFDDY 6.2 05/19/2006    FREET4 0.84 07/01/2013          Chemistry        Component Value Date/Time     07/25/2022 1341    K 4.5 07/25/2022 1341     07/25/2022 1341    CO2 26 07/25/2022 1341    BUN 14 07/25/2022 1341    CREATININE 0.7 07/25/2022 1341    GLU 85 07/25/2022 1341        Component Value Date/Time    CALCIUM 9.3 07/25/2022 1341    ALKPHOS 41 (L) 07/25/2022 1341    AST 18 07/25/2022 1341    ALT 16 07/25/2022 1341    BILITOT 0.9 07/25/2022 1341    ESTGFRAFRICA >60.0 07/25/2022 1341    EGFRNONAA >60.0 07/25/2022 1341         Lab Results   Component Value Date    HGBA1C 4.7 12/30/2021      PE:  GENERAL: middle aged female, well developed, well nourished  NECK: Supple neck, normal thyroid. No bruit  LYMPHATIC: No cervical or supraclavicular lymphadenopathy  CARDIOVASCULAR: Normal heart sounds, no pedal edema  RESPIRATORY: Normal effort, clear to auscultation  MUSC: 2+ DTR UE/LE  NEURO: steady gait, CN ll-Xll grossly intact  PSYCH: normal mood and affect    Assessment/Plan:   1. Hypoglycemia  POCT Glucose, Hand-Held Device    Glucose Monitoring Continuous Min 72 Hours      2. Fatigue, unspecified type  T4, Free    TSH    CBC Auto Differential    Comprehensive Metabolic Panel           Hypoglycemia-. Increase protein with meals. Pt eating high carb meals and correcting hypoglycemia with sugary food.  Will repeat Dexcom pro since she is having hyperglycemia too. Eat 5-6 small meals daily. MNT at [facility].   Fatigue-check thyroid, electrolytes and blood count.  Wt gain-maybe due to decreased activity. Will check thyroid.     Dexcom pro  Labs today  F/u in 6 mths

## 2022-11-15 ENCOUNTER — TELEPHONE (OUTPATIENT)
Dept: ENDOCRINOLOGY | Facility: CLINIC | Age: 47
End: 2022-11-15
Payer: MEDICARE

## 2022-11-15 NOTE — TELEPHONE ENCOUNTER
----- Message from AILYN Benoit PA-C sent at 11/15/2022  8:57 AM CST -----  Contact: pt  Please give her the results.  ----- Message -----  From: Karissa Linder MA  Sent: 11/15/2022   8:39 AM CST  To: AILYN Benoit PA-C    Please advise  ----- Message -----  From: Danielle Restrepo  Sent: 11/15/2022   8:25 AM CST  To: [facility] Staff    Type: Needs Medical Advice         Who Called: pt  Best Call Back Number:[phone]  Additional Information: Requesting a call back regarding Pt had labs 11/04 and is asking for her results.   Please Advise- Thank you

## 2022-11-30 ENCOUNTER — PROCEDURE VISIT (OUTPATIENT)
Dept: DIABETES | Facility: CLINIC | Age: 47
End: 2022-11-30
Payer: MEDICARE

## 2022-11-30 DIAGNOSIS — E16.2 HYPOGLYCEMIA: Primary | ICD-10-CM

## 2022-11-30 NOTE — PATIENT INSTRUCTIONS
"DIABETES EDUCATOR NOTE   PLACEMENT OF DEXCOM G6 PRO SENSOR  CONTINOUS GLUCOSE MONITORING SYSTEM (CGMS)    Patient is here in clinic today for placement of continuous glucose monitoring sensor.                 Patient verified that they were here for CGMS procedure ordered by their provider and that they have a working glucose meter and supplies at home.     Patient provided with a Dexcom G6 Sensor and a copy of the Blinded CGM Patient Handout.  A detailed explanation of Continuous Glucose Monitoring was provided. Patient informed that this is a blind procedure and that they will not actually see the blood sugar tracing in real time.  Reviewed with patient the  patient education handout called "Dexcom G6 Pro Continuous Glucose Monitoring System; Using Your G6 Pro" to review self-care during the study to avoid sensor loosening or removal ie... bathing, swimming, dressing, and exercising.    Instructed patient to check blood sugar using home glucometer and to record the following on provided patient log sheets: Blood sugar taken at home, meals and snacks, activity, and diabetes medications taken and dosage.    Patient was brought to a private location. Insertion was selected and prepared and allowed to dry. Glucose Sensor Serial Number [id number] was inserted to back of patient's RIGHT abdomen.                 The following forms were given and reviewed in detail with patient and all questions answered:  Blinded CGM Patient Handout  Dexcom G6 Pro Continuous Glucose Monitoring System--Using Your G6 Pro,  For Blinded Patient Only          Time: 15 minutes    "

## 2022-11-30 NOTE — PROGRESS NOTES
Professional CGM put on pt today; will return in 7 days to have removed and download data for Provider.

## 2022-12-07 ENCOUNTER — NUTRITION (OUTPATIENT)
Dept: DIABETES | Facility: CLINIC | Age: 47
End: 2022-12-07
Payer: MEDICARE

## 2022-12-07 DIAGNOSIS — E16.2 HYPOGLYCEMIA: ICD-10-CM

## 2022-12-07 PROCEDURE — 99999 PR PBB SHADOW E&M-EST. PATIENT-LVL I: ICD-10-PCS | Mod: PBBFAC,,, | Performed by: NUTRITIONIST

## 2022-12-07 PROCEDURE — 95250 PR GLUCOSE MONITORING,72 HRS,SUB-Q SENSOR: ICD-10-PCS | Mod: S$GLB,,, | Performed by: NUTRITIONIST

## 2022-12-07 PROCEDURE — 95250 CONT GLUC MNTR PHYS/QHP EQP: CPT | Mod: S$GLB,,, | Performed by: NUTRITIONIST

## 2022-12-07 PROCEDURE — 99999 PR PBB SHADOW E&M-EST. PATIENT-LVL I: CPT | Mod: PBBFAC,,, | Performed by: NUTRITIONIST

## 2022-12-15 ENCOUNTER — PROCEDURE VISIT (OUTPATIENT)
Dept: NEUROLOGY | Facility: CLINIC | Age: 47
End: 2022-12-15
Payer: MEDICARE

## 2022-12-15 ENCOUNTER — TELEPHONE (OUTPATIENT)
Dept: NEUROLOGY | Facility: CLINIC | Age: 47
End: 2022-12-15
Payer: MEDICARE

## 2022-12-15 VITALS
SYSTOLIC BLOOD PRESSURE: 118 MMHG | HEIGHT: 68 IN | RESPIRATION RATE: 17 BRPM | DIASTOLIC BLOOD PRESSURE: 81 MMHG | WEIGHT: 175.06 LBS | HEART RATE: 79 BPM | BODY MASS INDEX: 26.53 KG/M2

## 2022-12-15 DIAGNOSIS — G43.719 INTRACTABLE CHRONIC MIGRAINE WITHOUT AURA AND WITHOUT STATUS MIGRAINOSUS: Primary | ICD-10-CM

## 2022-12-15 PROCEDURE — 64615 CHEMODENERV MUSC MIGRAINE: CPT | Mod: S$GLB,,, | Performed by: PHYSICIAN ASSISTANT

## 2022-12-15 PROCEDURE — 64615 PR CHEMODENERVATION OF MUSCLE FOR CHRONIC MIGRAINE: ICD-10-PCS | Mod: S$GLB,,, | Performed by: PHYSICIAN ASSISTANT

## 2022-12-15 NOTE — PROCEDURES
Procedures  [facility]  [address] [place] [address]  Phone:[phone]  Fax: [phone]  Botox Visit, #3    Chief Complaint   Patient presents with    Botulinum Toxin Injection         A/P:        ICD-10-CM ICD-9-CM   1. Intractable chronic migraine without aura and without status migrainosus  G43.719 346.71     Botox for migraine    PROCEDURE NOTE:  BOTOX injection is indicated for the prophylaxis of headaches in adult patients with chronic  migraine. Patient meets indications for BOTOX therapy.  Potential risks and benefits were reviewed. Side effects including, but not limited to, potential  systemic allergic reactions of the anaphylactic type as well as local injection site reactions of  blepharoptosis, diplopia, infection, bleeding, pain, redness and bruising were reviewed. The  potential for headaches and/or neck pain post procedure were reviewed.  The patient's questions were answered. The patient signed a consent form. Patient  understands that depending on their insurance carrier, there may be a copay for this treatment.  BOTOX was reconstituted using  two 100 unit vials and diluted with 4 mL of sterile saline.  BOTOX was injected as per the PREEMPT trial injection paradigm with dose administered as 5  unit intramuscular (IM) injections per site using a sterile, 30-gauge 0.5 inch needle as follows:  Muscle Dose, # of Sites   10 units divided in 2 sites  Procerus 5 units in 1 site  Frontalis 20 units divided in 4 sites  Temporalis 40 units divided in 8 sites  Occipitalis 30 units divided in 6 sites  Cervical paraspinal 20 units divided in 4 sites  Trapezius 30 units divided in 6 sites  Each site was cleaned with alcohol prior to injection. A total dose of 155 units were injected. 45  units were discarded/wasted.  The patient tolerated the procedure well with no immediate complications.  MEDICATION INFO:  NDC 3455-9770-04   Lot #   [id number]  Exp 03/2025      As aside:states still having near daily HA. Stress/anxiety she feels are the triggers. She has follow up with mental health. She has no other new concerns.        Follow up in 3 months for repeat injection       Marcelino Perez MPA, PA-C  Attending available-Dr Maciel MD           Personal Protective Equipment:    Personal Protective Equipment was used during this encounter including;    mask-surgical, and non latex gloves.     12/15/2022      CC: Rosy Metcalf MD

## 2022-12-20 DIAGNOSIS — Z12.31 OTHER SCREENING MAMMOGRAM: ICD-10-CM

## 2022-12-29 DIAGNOSIS — G44.40 REBOUND HEADACHE: ICD-10-CM

## 2022-12-29 DIAGNOSIS — M79.18 MYOFASCIAL PAIN: ICD-10-CM

## 2022-12-29 DIAGNOSIS — G43.E09 CHRONIC MIGRAINE WITH AURA: ICD-10-CM

## 2022-12-29 RX ORDER — GALCANEZUMAB 120 MG/ML
120 INJECTION, SOLUTION SUBCUTANEOUS
Qty: 1 ML | Refills: 6 | Status: SHIPPED | OUTPATIENT
Start: 2022-12-29 | End: 2023-09-23 | Stop reason: SDUPTHER

## 2023-01-25 ENCOUNTER — PATIENT MESSAGE (OUTPATIENT)
Dept: ADMINISTRATIVE | Facility: HOSPITAL | Age: 48
End: 2023-01-25
Payer: MEDICARE

## 2023-01-30 ENCOUNTER — OFFICE VISIT (OUTPATIENT)
Dept: NEUROLOGY | Facility: CLINIC | Age: 48
End: 2023-01-30
Payer: MEDICARE

## 2023-01-30 DIAGNOSIS — R41.89 SUBJECTIVE MEMORY COMPLAINTS: Primary | ICD-10-CM

## 2023-01-30 PROCEDURE — 90791 PSYCH DIAGNOSTIC EVALUATION: CPT | Mod: 95,FQ,, | Performed by: STUDENT IN AN ORGANIZED HEALTH CARE EDUCATION/TRAINING PROGRAM

## 2023-01-30 PROCEDURE — 90791 PR PSYCHIATRIC DIAGNOSTIC EVALUATION: ICD-10-PCS | Mod: 95,FQ,, | Performed by: STUDENT IN AN ORGANIZED HEALTH CARE EDUCATION/TRAINING PROGRAM

## 2023-01-30 PROCEDURE — 99499 NO LOS: ICD-10-PCS | Mod: 95,,, | Performed by: STUDENT IN AN ORGANIZED HEALTH CARE EDUCATION/TRAINING PROGRAM

## 2023-01-30 PROCEDURE — 99499 UNLISTED E&M SERVICE: CPT | Mod: 95,,, | Performed by: STUDENT IN AN ORGANIZED HEALTH CARE EDUCATION/TRAINING PROGRAM
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
## 2023-02-20 ENCOUNTER — OFFICE VISIT (OUTPATIENT)
Dept: NEUROLOGY | Facility: CLINIC | Age: 48
End: 2023-02-20
Payer: MEDICARE

## 2023-02-20 DIAGNOSIS — F25.0 SCHIZOAFFECTIVE DISORDER, BIPOLAR TYPE: ICD-10-CM

## 2023-02-20 DIAGNOSIS — F33.1 MODERATE EPISODE OF RECURRENT MAJOR DEPRESSIVE DISORDER: ICD-10-CM

## 2023-02-20 DIAGNOSIS — F41.9 ANXIETY: ICD-10-CM

## 2023-02-20 DIAGNOSIS — R41.844 EXECUTIVE FUNCTION DEFICIT: Primary | ICD-10-CM

## 2023-02-20 DIAGNOSIS — R41.89 SUBJECTIVE MEMORY COMPLAINTS: ICD-10-CM

## 2023-02-20 PROCEDURE — 99499 UNLISTED E&M SERVICE: CPT | Mod: S$GLB,,, | Performed by: STUDENT IN AN ORGANIZED HEALTH CARE EDUCATION/TRAINING PROGRAM

## 2023-02-20 PROCEDURE — 96139 PR PSYCH/NEUROPSYCH TEST ADMIN/SCORING, BY TECH, 2+ TESTS, EA ADDTL 30 MIN: ICD-10-PCS | Mod: S$GLB,,, | Performed by: STUDENT IN AN ORGANIZED HEALTH CARE EDUCATION/TRAINING PROGRAM

## 2023-02-20 PROCEDURE — 99999 PR PBB SHADOW E&M-EST. PATIENT-LVL I: ICD-10-PCS | Mod: PBBFAC,,, | Performed by: STUDENT IN AN ORGANIZED HEALTH CARE EDUCATION/TRAINING PROGRAM

## 2023-02-20 PROCEDURE — 96138 PR PSYCH/NEUROPSYCH TEST ADMIN/SCORING, BY TECH, 2+ TESTS, 1ST 30 MIN: ICD-10-PCS | Mod: S$GLB,,, | Performed by: STUDENT IN AN ORGANIZED HEALTH CARE EDUCATION/TRAINING PROGRAM

## 2023-02-20 PROCEDURE — 96138 PSYCL/NRPSYC TECH 1ST: CPT | Mod: S$GLB,,, | Performed by: STUDENT IN AN ORGANIZED HEALTH CARE EDUCATION/TRAINING PROGRAM

## 2023-02-20 PROCEDURE — 96133 PR NEUROPSYCHOLOGIC TEST EVAL SVCS, EA ADDTL HR: ICD-10-PCS | Mod: S$GLB,,, | Performed by: STUDENT IN AN ORGANIZED HEALTH CARE EDUCATION/TRAINING PROGRAM

## 2023-02-20 PROCEDURE — 96132 PR NEUROPSYCHOLOGIC TEST EVAL SVCS, 1ST HR: ICD-10-PCS | Mod: S$GLB,,, | Performed by: STUDENT IN AN ORGANIZED HEALTH CARE EDUCATION/TRAINING PROGRAM

## 2023-02-20 PROCEDURE — 96132 NRPSYC TST EVAL PHYS/QHP 1ST: CPT | Mod: S$GLB,,, | Performed by: STUDENT IN AN ORGANIZED HEALTH CARE EDUCATION/TRAINING PROGRAM

## 2023-02-20 PROCEDURE — 96139 PSYCL/NRPSYC TST TECH EA: CPT | Mod: S$GLB,,, | Performed by: STUDENT IN AN ORGANIZED HEALTH CARE EDUCATION/TRAINING PROGRAM

## 2023-02-20 PROCEDURE — 96133 NRPSYC TST EVAL PHYS/QHP EA: CPT | Mod: S$GLB,,, | Performed by: STUDENT IN AN ORGANIZED HEALTH CARE EDUCATION/TRAINING PROGRAM

## 2023-02-20 PROCEDURE — 99999 PR PBB SHADOW E&M-EST. PATIENT-LVL I: CPT | Mod: PBBFAC,,, | Performed by: STUDENT IN AN ORGANIZED HEALTH CARE EDUCATION/TRAINING PROGRAM

## 2023-02-20 PROCEDURE — 99499 NO LOS: ICD-10-PCS | Mod: S$GLB,,, | Performed by: STUDENT IN AN ORGANIZED HEALTH CARE EDUCATION/TRAINING PROGRAM

## 2023-02-20 NOTE — Clinical Note
Hi Franc,  Do you think it would be worth referring her to Dr. Ojeda or Dr. Villa since she is concerned about having seizures?

## 2023-02-22 ENCOUNTER — OFFICE VISIT (OUTPATIENT)
Dept: NEUROLOGY | Facility: CLINIC | Age: 48
End: 2023-02-22
Payer: MEDICARE

## 2023-02-22 DIAGNOSIS — R41.844 EXECUTIVE FUNCTION DEFICIT: Primary | ICD-10-CM

## 2023-02-22 DIAGNOSIS — F25.0 SCHIZOAFFECTIVE DISORDER, BIPOLAR TYPE: ICD-10-CM

## 2023-02-22 DIAGNOSIS — F41.9 ANXIETY: ICD-10-CM

## 2023-02-22 DIAGNOSIS — F33.1 MODERATE EPISODE OF RECURRENT MAJOR DEPRESSIVE DISORDER: ICD-10-CM

## 2023-02-22 PROCEDURE — 99499 UNLISTED E&M SERVICE: CPT | Mod: 95,,, | Performed by: STUDENT IN AN ORGANIZED HEALTH CARE EDUCATION/TRAINING PROGRAM

## 2023-02-22 PROCEDURE — 99499 NO LOS: ICD-10-PCS | Mod: 95,,, | Performed by: STUDENT IN AN ORGANIZED HEALTH CARE EDUCATION/TRAINING PROGRAM

## 2023-02-22 NOTE — PROGRESS NOTES
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 Edition (WMS-IV) select subtests; [place] Verbal Learning Test, Second Edition (CVLT-II); Neuropsychological Assessment Battery (NAB) Naming; Category fluency (MOANS norms); Colleen-Ayala Executive Function System (D-KEFS) select subtests; [place] Card Sorting Test-64 (WCST-64); Saul Complex Figure Test (RCFT), copy; Espinosa Depression Inventory, Second Edition (BDI-II); Personality Assessment Inventory (SERGE). Manual norms were used unless otherwise indicated.      TEST RESULTS  GLOBAL COGNITIVE FUNCTION  Performance is within normal limits on a cognitive screening measure (MMSE 28/30). She missed one point on orientation (city) and one point on recall.     COGNITIVE BASELINE  Baseline cognitive function is estimated to be in the low average range based on word reading and educational/occupational attainment.    ATTENTION   Immediate auditory attention is low average. Scores on auditory working memory tasks are low average to average. Her performance on a number sequencing task was stronger than tasks requiring her to repeat numbers backwards and forwards . Mental arithmetic is low average.     PROCESSING SPEED  Speeded digit symbol coding is below average. Speeded symbol matching is  exceptionally low. She skipped three items on this task. Rapid visual scanning is average. Rapid number sequencing is low average and rapid letter sequencing is exceptionally low.     EXECUTIVE FUNCTIONS  On a mental set shifting task with numbers and letters, her performance is exceptionally low. She made two errors (one set loss and another sequencing). Mental set shifting in the verbal domain is average for both speed and accuracy. Verbal and nonverbal abstract reasoning is average. Conceptual problem solving on a card sorting task is within normal limits, although she had difficulty generating conceptual level responses.     LANGUAGE  Vocabulary knowledge is average. Object naming is low average (29/31), but her performance improved with phonemic cues. Letter fluency is exceptionally low. Semantic fluency is low average.     VISUOSPATIAL/PERCEPTUAL  Visual analysis and assembly with 3-dimensional blocks is low average. Copy of a complex figure is disorganized with multiple omission errors. She exhibited an inefficient approach to the task. She rotated her drawing frequently but did not rotate the stimulus page.     MEMORY  Learning efficiency is exceptionally low on a 16-item word list. She recalled one word after a brief distractor task and no words after a longer delay. Recognition discriminability is exceptionally low. Performance on a story memory task is average, and recognition of story elements is average.    MOTOR  Motor speed is low average.    MOOD  Responses on a self-report mood inventory suggests moderate symptoms of depression. She endorsed suicidal thoughts on the questionnaire. When asked about this endorsement, she stated that she had not had suicidal thoughts in the past several months but endorsed the item because she thought the question was asking if she had ever in her life had suicidal thoughts. On a detailed personality inventory, her responses suggest that her responses were less  consistent toward the end of the test, raising the possibility of a change in her approach to the test. There is subtle indication that responses are overrepresentated or exaggerated to some degree. With these considerations in mind, she endorses elevated somatic concerns that exceed those of of clinical samples. Her responses also suggest thought disorders and unusual beliefs with limited reality testing, again exceeding that of clinical samples. She endorses ongoing anxiety and psychological distress from prior trauma.       Raw Score Standardized Score Percentile/CP Descriptor   TOMM 1 31 - - -   TOMM 2 50 - - -   TOMM R 47 - - -   ACS LM II Rec 23 - - -   ACS RDS 7 - - -   CVLT-II  - - -   PREMORBID FUNCTIONING Raw Score Standardized Score Percentile/CP Descriptor   TOPF simple dem. eFSIQ - 97 42 Average   TOPF pred. eFSIQ - 82 12 Low Average   TOPF simple + pred. eFSIQ - 88 21 Low Average   INTELLECTUAL FUNCTIONING Raw Score Standardized Score Percentile/CP Descriptor   WAIS-IV        VCI - 89 23 Low Average   STACEY - 86 18 Low Average   WMI - 83 13 Low Average   PSI - 68 2 Below Average   FSIQ - 79 8 Below Average   GAI - 86 18 Low Average   Similarities 21 8 25 Average   Vocabulary 28 8 25 Average   Block Design 24 6 9 Low Average   Matrix Reasoning 15 9 37 Average   Digit Span 22 7 16 Low Average         DS Forward 8 7 16 Low Average         DS Backward 5 6 9 Low Average         DS Sequence 9 11 63 Average         Longest Digit Forward 5 - - -         Longest Digit Backward 3 - - -         Longest Digit Sequence 7 - - -   Arithmetic 10 7 16 Low Average   Symbol Search 13 3 1 Exceptionally Low    Coding 38 5 5 Below Average   COGNITIVE SCREENING Raw Score Standardized Score Percentile/CP Descriptor   MMSE 28 - - WNL   Orientation - Place 4/5 - - -   Orientation - Date 5/5 - - -   LANGUAGE FUNCTIONING Raw Score Standardized Score Percentile/CP Descriptor   WAIS-IV VCI - 89 23 Low Average   WAIS-IV  Similarities 21 8 25 Average   WAIS-IV Vocabulary 28 8 25 Average   TOPF Word Reading 23 81 10 Low Average   NAB Naming 29 43 25 Average   NAB Naming Percent Correct After Semantic Cuing 0 - 38 Average   NAB Naming Percent Correct After Phonemic Cuing 100 - 100 Exceptionally High   DKEFS Verbal Fluency: Letter 13 2 0.4 Exceptionally Low    DKEFS Verbal Fluency: Category 31 6 9 Low Average   VISUOSPATIAL FUNCTIONING Raw Score Standardized Score Percentile/CP Descriptor   WAIS-IV STACEY - 86 18 Low Average   WAIS-IV Block Design 24 6 9 Low Average   WAIS-IV Matrix Reasoning 15 9 37 Average   RCFT Copy 26.5 - <1 Exceptionally Low   RCFT Time to Copy 300 - 11-16 Low Average   LEARNING & MEMORY Raw Score Standardized Score Percentile/CP Descriptor   CVLT-II        Trials 1-5 (T-Score) 24 23 0.3 Exceptionally Low    List A Trial 1 3 -2 2.275 Below Average   List A Trial 5 7 -2.5 1 Exceptionally Low    List B 4 -1 16 Low Average   SDFR 1 -3.5 0 Exceptionally Low   SDCR 0 -5 0 Exceptionally Low   LDFR 0 -4 0 Exceptionally Low   LDCR 0 -4.5 0 Exceptionally Low   Semantic Clustering 0 -0.5 31 Average   Learning Elliott 1 -1 16 Low Average   Repetitions 2 -0.5 31 Average   Intrusions 4 0 50 Average   Recognition Hits 10 -3 0 Exceptionally Low    False Positives 13 3 100 Exceptionally High   Discriminability 0.6 -3 0 Exceptionally Low    WMS-IV Subtests        LM I 22 9 37 Average   LM II 17 8 25 Average   LM Recognition 23 - 26-50 Average   ATTENTION/WORKING MEMORY Raw Score Standardized Score Percentile/CP Descriptor   WAIS-IV WMI - 83 13 Low Average   WAIS-IV Digit Span 22 7 16 Low Average         DS Forward 8 7 16 Low Average         DS Backward 5 6 9 Low Average         DS Sequence 9 11 63 Average         Longest Digit Forward 5 - - -         Longest Digit Backward 3 - - -         Longest Digit Sequence 7 - - -   WAIS-IV Arithmetic 10 7 16 Low Average   MENTAL PROCESSING SPEED Raw Score Standardized Score Percentile/CP  Descriptor   WAIS-IV PSI - 68 2 Below Average   WAIS-IV Symbol Search 13 3 1 Exceptionally Low    WAIS-IV Coding 38 5 5 Below Average   DKEFS Trails: Visual Scanning 26 9 37 Average   DKEFS Trails: Number Sequencing 46 7 16 Low Average   DKEFS Trails: Letter Sequencing  69 1 0.1 Exceptionally Low    EXECUTIVE FUNCTIONING Raw Score Standardized Score Percentile/CP Descriptor   DKEFS Trails: Switching 211 1 0.1 Exceptionally Low    DKEFS Verbal Fluency: Switching Total 13 9 37 Average   DKEFS Verbal Fluency: Switching Accuracy 12 10 50 Average   WCST-64  N/A      Total Correct 35 - - -   Total Errors 29 73 4 Below Average   Perseverative Resp. 10 88 21 Low Average   Perseverative Err. 10 86 18 Low Average   Nonperseverative Err. 19 70 2 Below Average   Concept. Level Response 30 77 6 Below Average   Categories Completed 3 - >16 WNL   FMS 0 - N/A N/A   Learning to Learn -5.95 - >16 WNL   WAIS-IV Similarities 21 8 25 Average   WAIS-IV Matrix Reasoning 15 9 37 Average   FRONTOMOTOR  Raw Score Standardized Score Percentile/CP Descriptor   DKEFS Trails: Motor Speed 59 6 9 Low Average   MOOD & PERSONALITY Raw Score Standardized Score Percentile/CP Descriptor   BDI-2 16 - - Mild   SERGE   - -   ICN 9 61 - -   INF 5 59 - -   NIM 7 70 - -   PIM 11 41 - -   KRYSTEN 60 99 - -   ANX 47 79 - -   AMY 52 89 - -   DEP 40 77 - -   MAN 30 58 - -   PAR 45 81 - -   SCZ 46 91 - -   BOR 40 72 - -   ANT 23 61 - -   ALC 19 75 - -   DRG 13 68 - -   AGG 28 66 - -   SAMANTHA 11 66 - -   STR 14 68 - -   NON 13 72 - -   RXR 6 33 - -   DOM 17 44 - -   WRM 15 35 - -   ss = scaled score (mean = 10, SD = 3); SS = standard score (mean = 100, SD = 15); Tscore mean = 50, SD = 10; zscore (mean = 0.00, SD = 1)       It is important to note that scores/percentiles should only be interpreted by a neuropsychologist. It is common for healthy individuals to have 1-3 isolated low/unusual scores that are not indicative of any significant cognitive dysfunction.    BILLING      Service Description CPT Code Minutes Units   Test Evaluation Services --  --   Neuropsychological testing evaluation services by physician [id number] [phone]   Each additional hour by physician [phone]   Test Administration and Scoring --  --   Psychological or neuropsychological test administration and scoring by technician [id number]   Each additional 30 minutes by technician [id number]  10

## 2023-02-23 NOTE — PROGRESS NOTES
NEUROPSYCHOLOGICAL EVALUATION FEEDBACK    Referral Information  Name: Linda Sullivan   MRN: [id number]  Age: 47 y.o.    : 1975  Race: White    Gender: female        Chief complaint leading to consultation/medical necessity: Feedback from neuropsychological evaluation.  Established Patient - Telehealth Visit   Patient location: [place], [place]  Visit type: Virtual visit with audio only (telephone)  The reason for the audio only service rather than synchronous audio and video virtual visit was related to technical difficulties or patient preference/necessity.  Total time spent with patient: 40 minutes.  Billin attached to testing visit on 23.  Each patient to whom he or she provides medical services by telemedicine is: (1) informed of the relationship between the physician and patient and the respective role of any other health care provider with respect to management of the patient; and (2) notified that he or she may decline to receive medical services by telemedicine and may withdraw from such care at any time. Patient verbally consented to receive this service via voice-only telephone call. This service was not originating from a related E/M service provided within the previous 7 days nor will  to an E/M service or procedure within the next 24 hours or my soonest available appointment.  Prevailing standard of care was able to be met in this audio-only visit.   Consent/Emergency Plan: The patient expressed an understanding of the purpose of the evaluation and consented to all procedures. I informed the patient of limits to confidentiality and discussed an emergency plan.    FEEDBACK NOTE       Ms. Linda Sullivan participated in a telephone feedback session today.  We discussed the results of the neuropsychological evaluation. I gave time to discuss questions and concerns. A copy of a evaluation report will be provided to Ms. Sullivan via mail.     Cesia Brandt, Ph.D.  Licensed Clinical  Neuropsychologist  [facility] - Department of Neurology

## 2023-02-24 ENCOUNTER — TELEPHONE (OUTPATIENT)
Dept: NEUROLOGY | Facility: CLINIC | Age: 48
End: 2023-02-24
Payer: MEDICARE

## 2023-02-24 DIAGNOSIS — R29.818 TRANSIENT NEUROLOGICAL SYMPTOMS: Primary | ICD-10-CM

## 2023-02-24 NOTE — TELEPHONE ENCOUNTER
Order placed for EEG w/awake and asleep record. I reached out to [facility] and requested she contact the pt to schedule.

## 2023-02-28 ENCOUNTER — PATIENT MESSAGE (OUTPATIENT)
Dept: PSYCHIATRY | Facility: CLINIC | Age: 48
End: 2023-02-28
Payer: MEDICARE

## 2023-03-06 ENCOUNTER — TELEPHONE (OUTPATIENT)
Dept: NEUROLOGY | Facility: CLINIC | Age: 48
End: 2023-03-06
Payer: MEDICARE

## 2023-03-06 NOTE — TELEPHONE ENCOUNTER
Called, no answer, mailbox full.  Pt on the scheduled for Botox already, does she need to reschedule?

## 2023-03-06 NOTE — TELEPHONE ENCOUNTER
----- Message from Christiano Lopez sent at 3/6/2023 12:45 PM CST -----  Regarding: reschedule botox  Type:  Reschedule Botox    Caller is requesting a BOTOX appointment.      Name of Caller:  moe    When is the first available appointment?  Dept Book    Procedure:  Botox    Best Call Back Number:  [phone]    Additional Information:

## 2023-03-20 DIAGNOSIS — N83.202 LEFT OVARIAN CYST: Primary | ICD-10-CM

## 2023-03-21 DIAGNOSIS — R07.9 CHEST PAIN, UNSPECIFIED TYPE: Primary | ICD-10-CM

## 2023-03-24 ENCOUNTER — HOSPITAL ENCOUNTER (OUTPATIENT)
Dept: RADIOLOGY | Facility: HOSPITAL | Age: 48
Discharge: HOME | End: 2023-03-24
Attending: INTERNAL MEDICINE
Payer: MEDICARE

## 2023-03-24 DIAGNOSIS — N83.202 LEFT OVARIAN CYST: ICD-10-CM

## 2023-03-24 PROCEDURE — 76856 US EXAM PELVIC COMPLETE: CPT | Mod: TC,PO

## 2023-03-24 PROCEDURE — 76830 TRANSVAGINAL US NON-OB: CPT | Mod: 26,,, | Performed by: RADIOLOGY

## 2023-03-24 PROCEDURE — 76856 US PELVIS COMP WITH TRANSVAG NON-OB (XPD): ICD-10-PCS | Mod: 26,,, | Performed by: RADIOLOGY

## 2023-03-24 PROCEDURE — 76856 US EXAM PELVIC COMPLETE: CPT | Mod: 26,,, | Performed by: RADIOLOGY

## 2023-03-24 PROCEDURE — 76830 US PELVIS COMP WITH TRANSVAG NON-OB (XPD): ICD-10-PCS | Mod: 26,,, | Performed by: RADIOLOGY

## 2023-04-19 ENCOUNTER — PATIENT MESSAGE (OUTPATIENT)
Dept: ADMINISTRATIVE | Facility: HOSPITAL | Age: 48
End: 2023-04-19
Payer: MEDICARE

## 2023-04-20 DIAGNOSIS — Z12.31 ENCOUNTER FOR SCREENING MAMMOGRAM FOR MALIGNANT NEOPLASM OF BREAST: Primary | ICD-10-CM

## 2023-04-24 ENCOUNTER — TELEPHONE (OUTPATIENT)
Dept: GASTROENTEROLOGY | Facility: CLINIC | Age: 48
End: 2023-04-24
Payer: MEDICARE

## 2023-04-24 NOTE — TELEPHONE ENCOUNTER
----- Message from Joan Aldana sent at 4/24/2023  1:14 PM CDT -----  Goof afternoon,    Pt has a referral and records scanned into  for a colonoscopy. Please call pt to schedule.     Thank you,  Joan ROBERTSON  [facility]

## 2023-04-28 ENCOUNTER — HOSPITAL ENCOUNTER (OUTPATIENT)
Dept: RADIOLOGY | Facility: HOSPITAL | Age: 48
Discharge: HOME | End: 2023-04-28
Attending: INTERNAL MEDICINE
Payer: MEDICARE

## 2023-04-28 DIAGNOSIS — Z12.31 ENCOUNTER FOR SCREENING MAMMOGRAM FOR MALIGNANT NEOPLASM OF BREAST: ICD-10-CM

## 2023-04-28 PROCEDURE — 77067 SCR MAMMO BI INCL CAD: CPT | Mod: TC,PO

## 2023-05-04 ENCOUNTER — OFFICE VISIT (OUTPATIENT)
Dept: ENDOCRINOLOGY | Facility: CLINIC | Age: 48
End: 2023-05-04
Payer: MEDICARE

## 2023-05-04 VITALS
BODY MASS INDEX: 27.08 KG/M2 | HEART RATE: 86 BPM | SYSTOLIC BLOOD PRESSURE: 108 MMHG | DIASTOLIC BLOOD PRESSURE: 80 MMHG | HEIGHT: 68 IN | TEMPERATURE: 99 F | WEIGHT: 178.69 LBS | OXYGEN SATURATION: 99 %

## 2023-05-04 DIAGNOSIS — E16.2 HYPOGLYCEMIA: Primary | ICD-10-CM

## 2023-05-04 DIAGNOSIS — R53.83 FATIGUE, UNSPECIFIED TYPE: ICD-10-CM

## 2023-05-04 PROCEDURE — 3074F PR MOST RECENT SYSTOLIC BLOOD PRESSURE < 130 MM HG: ICD-10-PCS | Mod: CPTII,S$GLB,, | Performed by: PHYSICIAN ASSISTANT

## 2023-05-04 PROCEDURE — 99999 PR PBB SHADOW E&M-EST. PATIENT-LVL III: ICD-10-PCS | Mod: PBBFAC,,, | Performed by: PHYSICIAN ASSISTANT

## 2023-05-04 PROCEDURE — 3079F PR MOST RECENT DIASTOLIC BLOOD PRESSURE 80-89 MM HG: ICD-10-PCS | Mod: CPTII,S$GLB,, | Performed by: PHYSICIAN ASSISTANT

## 2023-05-04 PROCEDURE — 3079F DIAST BP 80-89 MM HG: CPT | Mod: CPTII,S$GLB,, | Performed by: PHYSICIAN ASSISTANT

## 2023-05-04 PROCEDURE — 99214 OFFICE O/P EST MOD 30 MIN: CPT | Mod: S$GLB,,, | Performed by: PHYSICIAN ASSISTANT

## 2023-05-04 PROCEDURE — 1159F PR MEDICATION LIST DOCUMENTED IN MEDICAL RECORD: ICD-10-PCS | Mod: CPTII,S$GLB,, | Performed by: PHYSICIAN ASSISTANT

## 2023-05-04 PROCEDURE — 3074F SYST BP LT 130 MM HG: CPT | Mod: CPTII,S$GLB,, | Performed by: PHYSICIAN ASSISTANT

## 2023-05-04 PROCEDURE — 1160F PR REVIEW ALL MEDS BY PRESCRIBER/CLIN PHARMACIST DOCUMENTED: ICD-10-PCS | Mod: CPTII,S$GLB,, | Performed by: PHYSICIAN ASSISTANT

## 2023-05-04 PROCEDURE — 3008F PR BODY MASS INDEX (BMI) DOCUMENTED: ICD-10-PCS | Mod: CPTII,S$GLB,, | Performed by: PHYSICIAN ASSISTANT

## 2023-05-04 PROCEDURE — 1159F MED LIST DOCD IN RCRD: CPT | Mod: CPTII,S$GLB,, | Performed by: PHYSICIAN ASSISTANT

## 2023-05-04 PROCEDURE — 99214 PR OFFICE/OUTPT VISIT, EST, LEVL IV, 30-39 MIN: ICD-10-PCS | Mod: S$GLB,,, | Performed by: PHYSICIAN ASSISTANT

## 2023-05-04 PROCEDURE — 1160F RVW MEDS BY RX/DR IN RCRD: CPT | Mod: CPTII,S$GLB,, | Performed by: PHYSICIAN ASSISTANT

## 2023-05-04 PROCEDURE — 99999 PR PBB SHADOW E&M-EST. PATIENT-LVL III: CPT | Mod: PBBFAC,,, | Performed by: PHYSICIAN ASSISTANT

## 2023-05-04 PROCEDURE — 3008F BODY MASS INDEX DOCD: CPT | Mod: CPTII,S$GLB,, | Performed by: PHYSICIAN ASSISTANT

## 2023-05-04 RX ORDER — ACARBOSE 25 MG/1
25 TABLET ORAL
Qty: 270 TABLET | Refills: 3 | Status: SHIPPED | OUTPATIENT
Start: 2023-05-04 | End: 2023-11-09

## 2023-05-04 RX ORDER — GLUCAGON 3 MG/1
POWDER NASAL
Qty: 2 EACH | Refills: 1 | Status: SHIPPED | OUTPATIENT
Start: 2023-05-04

## 2023-05-04 NOTE — PROGRESS NOTES
CC: Hypoglycemia    HPI: Linda Sullivan is a 48 y.o. female here for hypoglycemia along with pending conditions listed in the Visit Diagnosis.      Reports episodes of dizziness, nausea, tremors. Dx in HS. She has had one episode of low blood sugar in the past few months. Reports having to eat frequently to prevent low blood sugar.  She will also eat oranges and veggie sticks. + polydipsia, polyuria. She drinks coke zero all day. Reports glucoses in 90-100s. Reports feeling jittery. Reports wt gain. 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 Reports fatigue. Pt complain of a rash on her hand. She has seen dermatology.    Diet:  BF-eggs, toast  LH-2 pieces of pizza  DN-apple, nectirines, veggie chips  Drinks coffee, gatorade.     She has three children. Hx of partial hysterectomy.    PMHx, PSHx: reviewed in epic.    Social Hx: no ETOH use. Smokes 4 packs weekly since she was 11 yo.    Wt Readings from Last 20 Encounters:   05/04/23 81.1 kg (178 lb 10.9 oz)   12/15/22 79.4 kg (175 lb 0.7 oz)   11/04/22 86.4 kg (190 lb 7.6 oz)   10/25/22 82.6 kg (182 lb 1.6 oz)   09/23/22 82.6 kg (182 lb)   09/22/22 82.7 kg (182 lb 6.9 oz)   08/23/22 76.9 kg (169 lb 9.6 oz)   07/25/22 74.6 kg (164 lb 5.7 oz)   06/30/22 72 kg (158 lb 11.7 oz)   06/07/22 71.9 kg (158 lb 8.2 oz)   05/04/22 69.7 kg (153 lb 8.8 oz)   03/28/22 71.4 kg (157 lb 6.5 oz)   12/30/21 62.9 kg (138 lb 11.2 oz)   10/01/21 60.2 kg (132 lb 11.5 oz)   06/23/21 60.3 kg (133 lb)   01/25/21 62.5 kg (137 lb 12.6 oz)   08/19/20 69 kg (152 lb 1.6 oz)   07/09/20 65.8 kg (145 lb 1 oz)   07/08/20 65.8 kg (145 lb)   05/26/20 65.8 kg (145 lb)      ROS:   Constitutional: + wt gain 20 lbs  Eyes: No recent visual changes  Cardiovascular: Denies current anginal symptoms  Respiratory: Denies current respiratory difficulty  Gastrointestinal: Denies recent bowel disturbances  GenitoUrinary - No dysuria  Skin: No new skin rash  Neurologic: No focal neurologic  "complaints  Musculoskeletal: no joint pain  Endocrine: no polyphagia, polydipsia or polyuria  Remainder ROS negative     /80 (BP Location: Left arm, Patient Position: Sitting, BP Method: Small (Manual))   Pulse 86   Temp 98.5 °F (36.9 °C) (Oral)   Ht 5' 8" (1.727 m)   Wt 81.1 kg (178 lb 10.9 oz)   SpO2 99%   BMI 27.17 kg/m²      Personally reviewed labs below:    Lab Results   Component Value Date    TSH 1.709 11/04/2022    [id number] 6.2 05/19/2006    FREET4 0.76 11/04/2022          Chemistry        Component Value Date/Time     11/04/2022 1022    K 4.3 11/04/2022 1022     (H) 11/04/2022 1022    CO2 19 (L) 11/04/2022 1022    BUN 9 11/04/2022 1022    CREATININE 0.8 11/04/2022 1022    GLU 99 11/04/2022 1022        Component Value Date/Time    CALCIUM 9.1 11/04/2022 1022    ALKPHOS 34 (L) 11/04/2022 1022    AST 15 11/04/2022 1022    ALT 13 11/04/2022 1022    BILITOT 0.5 11/04/2022 1022    ESTGFRAFRICA >60.0 07/25/2022 1341    EGFRNONAA >60.0 07/25/2022 1341         Lab Results   Component Value Date    HGBA1C 4.7 12/30/2021      PE:  GENERAL: middle aged female, well developed, well nourished  NECK: Supple neck, normal thyroid. No bruit  LYMPHATIC: No cervical or supraclavicular lymphadenopathy  CARDIOVASCULAR: Normal heart sounds, no pedal edema  RESPIRATORY: Normal effort, clear to auscultation  MUSC: 2+ DTR UE/LE  NEURO: steady gait, CN ll-Xll grossly intact  SKIN: + raised rash on dorsal side of right hand  PSYCH: normal mood and affect    Assessment/Plan:   1. Hypoglycemia  acarbose (PRECOSE) 25 MG Tab    Hemoglobin A1C    Comprehensive Metabolic Panel      2. Fatigue, unspecified type  T4, Free    TSH      3. Adverse effect of acarbose           Hypoglycemia-. Increase protein with meals. Pt eating high carb meals and correcting hypoglycemia with sugary food.  Start acarbose 25 mg with meals. Eat 5-6 small meals daily. MNT at [facility] [place] [facility].   Fatigue-TFTs were normal.    F/u in 6 mths w/ labs " prior

## 2023-07-10 ENCOUNTER — TELEPHONE (OUTPATIENT)
Dept: GASTROENTEROLOGY | Facility: CLINIC | Age: 48
End: 2023-07-10
Payer: MEDICARE

## 2023-07-10 ENCOUNTER — PATIENT MESSAGE (OUTPATIENT)
Dept: GASTROENTEROLOGY | Facility: CLINIC | Age: 48
End: 2023-07-10
Payer: MEDICARE

## 2023-07-10 NOTE — TELEPHONE ENCOUNTER
----- Message from Judy Kam LPN sent at 7/10/2023 11:52 AM CDT -----  Regarding: FW: Colonoscopy Referral    ----- Message -----  From: Vielka Snider  Sent: 7/10/2023  11:27 AM CDT  To: Gale ROBERTSON Staff  Subject: Colonoscopy Referral                             Good morning,                           Please contact the pt for scheduling. The referral and records are scanned into media manager.  Have a great day.     DX- screening colonoscopy    Thank you,   Vielka thibodeaux

## 2023-08-05 ENCOUNTER — HOSPITAL ENCOUNTER (EMERGENCY)
Facility: HOSPITAL | Age: 48
Discharge: HOME | End: 2023-08-05
Attending: EMERGENCY MEDICINE
Payer: MEDICARE

## 2023-08-05 VITALS
HEART RATE: 82 BPM | SYSTOLIC BLOOD PRESSURE: 131 MMHG | DIASTOLIC BLOOD PRESSURE: 64 MMHG | RESPIRATION RATE: 18 BRPM | WEIGHT: 168 LBS | BODY MASS INDEX: 24.88 KG/M2 | HEIGHT: 69 IN | TEMPERATURE: 98 F | OXYGEN SATURATION: 98 %

## 2023-08-05 DIAGNOSIS — R10.30 LOWER ABDOMINAL PAIN: Primary | ICD-10-CM

## 2023-08-05 DIAGNOSIS — R19.7 DIARRHEA, UNSPECIFIED TYPE: ICD-10-CM

## 2023-08-05 LAB
ALBUMIN SERPL BCP-MCNC: 4.6 G/DL (ref 3.5–5.2)
ALP SERPL-CCNC: 45 U/L (ref 55–135)
ALT SERPL W/O P-5'-P-CCNC: 14 U/L (ref 10–44)
ANION GAP SERPL CALC-SCNC: 10 MMOL/L (ref 8–16)
AST SERPL-CCNC: 14 U/L (ref 10–40)
BASOPHILS # BLD AUTO: 0.02 K/UL (ref 0–0.2)
BASOPHILS NFR BLD: 0.3 % (ref 0–1.9)
BILIRUB SERPL-MCNC: 0.7 MG/DL (ref 0.1–1)
BUN SERPL-MCNC: 5 MG/DL (ref 6–20)
CALCIUM SERPL-MCNC: 9.2 MG/DL (ref 8.7–10.5)
CHLORIDE SERPL-SCNC: 109 MMOL/L (ref 95–110)
CO2 SERPL-SCNC: 18 MMOL/L (ref 23–29)
CREAT SERPL-MCNC: 0.8 MG/DL (ref 0.5–1.4)
DIFFERENTIAL METHOD: ABNORMAL
EOSINOPHIL # BLD AUTO: 0 K/UL (ref 0–0.5)
EOSINOPHIL NFR BLD: 0.4 % (ref 0–8)
ERYTHROCYTE [DISTWIDTH] IN BLOOD BY AUTOMATED COUNT: 11.7 % (ref 11.5–14.5)
EST. GFR  (NO RACE VARIABLE): >60 ML/MIN/1.73 M^2
GLUCOSE SERPL-MCNC: 86 MG/DL (ref 70–110)
HCT VFR BLD AUTO: 37.4 % (ref 37–48.5)
HGB BLD-MCNC: 13.2 G/DL (ref 12–16)
IMM GRANULOCYTES # BLD AUTO: 0.02 K/UL (ref 0–0.04)
IMM GRANULOCYTES NFR BLD AUTO: 0.3 % (ref 0–0.5)
LIPASE SERPL-CCNC: 12 U/L (ref 4–60)
LYMPHOCYTES # BLD AUTO: 1.8 K/UL (ref 1–4.8)
LYMPHOCYTES NFR BLD: 22.6 % (ref 18–48)
MCH RBC QN AUTO: 31.5 PG (ref 27–31)
MCHC RBC AUTO-ENTMCNC: 35.3 G/DL (ref 32–36)
MCV RBC AUTO: 89 FL (ref 82–98)
MONOCYTES # BLD AUTO: 0.5 K/UL (ref 0.3–1)
MONOCYTES NFR BLD: 6.6 % (ref 4–15)
NEUTROPHILS # BLD AUTO: 5.4 K/UL (ref 1.8–7.7)
NEUTROPHILS NFR BLD: 69.8 % (ref 38–73)
NRBC BLD-RTO: 0 /100 WBC
PLATELET # BLD AUTO: 258 K/UL (ref 150–450)
PMV BLD AUTO: 10 FL (ref 9.2–12.9)
POTASSIUM SERPL-SCNC: 3.7 MMOL/L (ref 3.5–5.1)
PROT SERPL-MCNC: 7.7 G/DL (ref 6–8.4)
RBC # BLD AUTO: 4.19 M/UL (ref 4–5.4)
SODIUM SERPL-SCNC: 137 MMOL/L (ref 136–145)
WBC # BLD AUTO: 7.74 K/UL (ref 3.9–12.7)

## 2023-08-05 PROCEDURE — 36415 COLL VENOUS BLD VENIPUNCTURE: CPT | Performed by: PHYSICIAN ASSISTANT

## 2023-08-05 PROCEDURE — 83690 ASSAY OF LIPASE: CPT | Performed by: PHYSICIAN ASSISTANT

## 2023-08-05 PROCEDURE — 25500020 PHARM REV CODE 255

## 2023-08-05 PROCEDURE — 80053 COMPREHEN METABOLIC PANEL: CPT | Performed by: PHYSICIAN ASSISTANT

## 2023-08-05 PROCEDURE — 85025 COMPLETE CBC W/AUTO DIFF WBC: CPT | Performed by: PHYSICIAN ASSISTANT

## 2023-08-05 PROCEDURE — 99285 EMERGENCY DEPT VISIT HI MDM: CPT | Mod: 25

## 2023-08-05 PROCEDURE — 96374 THER/PROPH/DIAG INJ IV PUSH: CPT | Mod: 59

## 2023-08-05 PROCEDURE — 63600175 PHARM REV CODE 636 W HCPCS: Performed by: EMERGENCY MEDICINE

## 2023-08-05 RX ORDER — KETOROLAC TROMETHAMINE 30 MG/ML
15 INJECTION, SOLUTION INTRAMUSCULAR; INTRAVENOUS
Status: COMPLETED | OUTPATIENT
Start: 2023-08-05 | End: 2023-08-05

## 2023-08-05 RX ADMIN — IOHEXOL 75 ML: 350 INJECTION, SOLUTION INTRAVENOUS at 04:08

## 2023-08-05 RX ADMIN — KETOROLAC TROMETHAMINE 15 MG: 30 INJECTION, SOLUTION INTRAMUSCULAR; INTRAVENOUS at 04:08

## 2023-08-05 NOTE — ED NOTES
Pt in possession of all belongings.  IV removed.  VSS; no signs of distress.  Discharge instructions given to pt and explained by RN; pt verbalized understanding.  Pt agreed with care and discharge.

## 2023-08-05 NOTE — FIRST PROVIDER EVALUATION
" Emergency Department TeleTriage Encounter Note      CHIEF COMPLAINT    Chief Complaint   Patient presents with    Abdominal Pain     Ovarian cysts; seen by pcp on Monday and referred to gyn.  Pain "12+" per patient.  Given RX- Mobic that pt states not working well.        VITAL SIGNS   Initial Vitals [08/05/23 1506]   BP Pulse Resp Temp SpO2   (!) 128/58 75 16 97.6 °F (36.4 °C) 97 %      MAP       --            ALLERGIES    Review of patient's allergies indicates:   Allergen Reactions    Penicillins Hives    Banana Nausea Only    Potato Nausea Only    Tomato (solanum lycopersicum) Nausea Only       PROVIDER TRIAGE NOTE  Patient presents with lower abdominal pain that started Wed. She had diarrhea that day, but has resolved.  No urinary symptoms. She has a history of ovarian cysts and was thinking that was the cause of the pain. NSAIDs not helping. She has had a hysterectomy.       ORDERS  Labs Reviewed - No data to display    ED Orders (720h ago, onward)      None              Virtual Visit Note: The provider triage portion of this emergency department evaluation and documentation was performed via [employer], a HIPAA-compliant telemedicine application, in concert with a tele-presenter in the room. A face to face patient evaluation with one of my colleagues will occur once the patient is placed in an emergency department room.      DISCLAIMER: This note was prepared with [employer] voice recognition transcription software. Garbled syntax, mangled pronouns, and other bizarre constructions may be attributed to that software system.    " Subjective:      Patient ID: Tavo Pillai is a 57 y.o. male.    Chief Complaint: Follow-up    This was scheduled as a 6 month surveillance visit for maintenance of chronic medical conditions, but patient has acute concerns to address at this time.    Chronic conditions largely reported as controlled.      Problem List Items Addressed This Visit     Generalized anxiety disorder (Chronic)    MDD (major depressive disorder), recurrent, in partial remission (Chronic)    Fatigue - Primary (Chronic)    Relevant Orders    TSH    Home Sleep Studies    Gastroesophageal reflux disease (Chronic)    Relevant Medications    pantoprazole (PROTONIX) 40 MG tablet    Hypertension (Chronic)    Relevant Medications    hydroCHLOROthiazide (HYDRODIURIL) 25 MG tablet    Other Relevant Orders    CBC Auto Differential    Comprehensive Metabolic Panel    Lipid Panel    Urinalysis, Reflex to Urine Culture Urine, Clean Catch    Mixed hyperlipidemia (Chronic)    Relevant Orders    Comprehensive Metabolic Panel    Lipid Panel    Primary insomnia (Chronic)    Relevant Orders    TSH    Home Sleep Studies    Chronic pain (Chronic)    Daytime somnolence    Relevant Orders    TSH    Home Sleep Studies      Other Visit Diagnoses     Cigarette smoker        Relevant Orders    Ambulatory referral/consult to Smoking Cessation Program    Hyperlipidemia, unspecified hyperlipidemia type        Relevant Medications    atorvastatin (LIPITOR) 10 MG tablet          The patient's Health Maintenance was reviewed and the following appears to be due:   Health Maintenance Due   Topic Date Due    Hepatitis C Screening  Never done    Pneumococcal Vaccines (Age 0-64) (1 - PCV) Never done    TETANUS VACCINE  Never done    Colorectal Cancer Screening  Never done    Shingles Vaccine (1 of 2) Never done    COVID-19 Vaccine (4 - Booster for Pfizer series) 03/10/2022       Past Medical History:  Past Medical History:   Diagnosis Date    Chronic pain     GERD  (gastroesophageal reflux disease)     Hyperlipidemia     Hypertension      Past Surgical History:   Procedure Laterality Date    left hand surgery Left     PROSTATE SURGERY      SPINE SURGERY       Review of patient's allergies indicates:  No Known Allergies  Current Outpatient Medications on File Prior to Visit   Medication Sig Dispense Refill    albuterol (PROVENTIL/VENTOLIN HFA) 90 mcg/actuation inhaler Inhale 2 puffs into the lungs every 6 (six) hours as needed for Wheezing.      amitriptyline (ELAVIL) 25 MG tablet Take 25 mg by mouth every evening.      dutasteride (AVODART) 0.5 mg capsule Take 1 capsule (0.5 mg total) by mouth once daily. 30 capsule 11    HYDROcodone-acetaminophen (NORCO)  mg per tablet Take 1 tablet by mouth 3 (three) times daily.      nicotine (NICODERM CQ) 21 mg/24 hr Place 1 patch onto the skin once daily. 28 patch 0    nicotine, polacrilex, (NICORETTE) 2 mg Gum Take 1 each (2 mg total) by mouth as needed (Do not exceed more than 10 pieces in 24 hours). 220 each 0    venlafaxine (EFFEXOR-XR) 150 MG Cp24 Take 150 mg by mouth once daily.      [DISCONTINUED] atorvastatin (LIPITOR) 10 MG tablet Take 1 tablet (10 mg total) by mouth once daily. 90 tablet 1    [DISCONTINUED] hydroCHLOROthiazide (HYDRODIURIL) 25 MG tablet Take 1 tablet (25 mg total) by mouth once daily. 90 tablet 1    [DISCONTINUED] omeprazole (PRILOSEC) 40 MG capsule Take 40 mg by mouth Daily.      [DISCONTINUED] pantoprazole (PROTONIX) 40 MG tablet Take 1 tablet (40 mg total) by mouth once daily. 90 tablet 1    [DISCONTINUED] tadalafiL (CIALIS) 5 MG tablet Take 1 tablet (5 mg total) by mouth daily as needed for Erectile Dysfunction. (Patient not taking: Reported on 7/12/2022) 30 tablet 2     No current facility-administered medications on file prior to visit.     Social History     Socioeconomic History    Marital status: Single    Number of children: 3   Occupational History    Occupation: Unemployed  "  Tobacco Use    Smoking status: Current Every Day Smoker     Packs/day: 1.00     Years: 20.00     Pack years: 20.00     Types: Cigarettes    Smokeless tobacco: Never Used   Substance and Sexual Activity    Alcohol use: Not Currently    Drug use: Not Currently     Family History   Family history unknown: Yes       Review of Systems   Psychiatric/Behavioral: Positive for sleep disturbance.   All other systems reviewed and are negative.      Objective:   /80 (BP Location: Right arm, Patient Position: Sitting, BP Method: Large (Automatic))   Pulse 76   Temp 98 °F (36.7 °C) (Temporal)   Resp 20   Ht 5' 6" (1.676 m)   Wt 81.2 kg (179 lb)   SpO2 99%   BMI 28.89 kg/m²     Physical Exam  Vitals and nursing note reviewed.   Constitutional:       General: He is awake.      Appearance: Normal appearance. He is well-developed and overweight.   HENT:      Head: Normocephalic and atraumatic.      Right Ear: Tympanic membrane, ear canal and external ear normal.      Left Ear: Tympanic membrane, ear canal and external ear normal.      Nose: Nose normal.      Mouth/Throat:      Mouth: Mucous membranes are dry.      Pharynx: Oropharynx is clear.   Eyes:      Extraocular Movements: Extraocular movements intact.      Conjunctiva/sclera: Conjunctivae normal.      Pupils: Pupils are equal, round, and reactive to light.   Cardiovascular:      Rate and Rhythm: Normal rate and regular rhythm.      Pulses: Normal pulses.      Heart sounds: Normal heart sounds.   Pulmonary:      Effort: Pulmonary effort is normal.      Breath sounds: Normal breath sounds.   Abdominal:      General: Abdomen is flat. Bowel sounds are normal.      Palpations: Abdomen is soft.   Musculoskeletal:         General: Normal range of motion.      Cervical back: Normal range of motion and neck supple.   Skin:     General: Skin is warm and dry.      Capillary Refill: Capillary refill takes less than 2 seconds.   Neurological:      General: No focal " deficit present.      Mental Status: He is alert and oriented to person, place, and time. Mental status is at baseline.   Psychiatric:         Mood and Affect: Mood normal.         Behavior: Behavior normal. Behavior is cooperative.         Thought Content: Thought content normal.         Judgment: Judgment normal.         No visits with results within 6 Month(s) from this visit.   Latest known visit with results is:   Historical on 02/10/2022   Component Date Value Ref Range Status    WBC 02/10/2022 9.6  4.5 - 11.5 Final    RBC 02/10/2022 4.99  4.70 - 6.10 Final    Hgb 02/10/2022 14.3  14.0 - 18.0 Final    Hct 02/10/2022 43.4  42.0 - 52.0 Final    MCV 02/10/2022 87.0  80.0 - 94.0 Final    MCH 02/10/2022 28.7  27.0 - 31.0 Final    MCHC 02/10/2022 32.9  33.0 - 36.0 Final    RDW 02/10/2022 14.7  11.5 - 17.0 Final    Platelet 02/10/2022 339  130 - 400 Final    MPV 02/10/2022 9.3  7.4 - 10.4 Final    Abs Neut 02/10/2022 5.03  2.10 - 9.20 Final    Manual Diff? 02/10/2022 No   Final    Neut % 02/10/2022 52.3  49.1 - 73.4 Final    Lymph % 02/10/2022 34.0  16.2 - 38.3 Final    Mono % 02/10/2022 10.0  4.7 - 11.3 Final    Eos % 02/10/2022 2.6  0.0 - 6.5 Final    Basophil % 02/10/2022 0.6  0.0 - 0.9 Final    Lymph # 02/10/2022 3.27  0.60 - 4.60 Final    Neut # 02/10/2022 5.03  2.10 - 9.20 Final    Mono # 02/10/2022 0.96  0.10 - 1.30 Final    Eos # 02/10/2022 0.25  0.00 - 0.90 Final    Baso # 02/10/2022 0.06  0.00 - 0.20 Final    IG# 02/10/2022 0.0500  0.0000 - 0.0155 Final    IG% 02/10/2022 0.500  0.000 - 0.430 Final    NRBC% 02/10/2022 0.0  0.0 - 0.2 Final    DO MICRO? 02/10/2022 No   Final    Color, UA 02/10/2022 DK YELLOW  YELLOW Final    Appearance, UA 02/10/2022 CLEAR  CLEAR Final    Specific Gravity,UA 02/10/2022 1.015  1.005 - 1.030 Final    pH, UA 02/10/2022 7.0  5.0 - 7.0 Final    Protein, UA 02/10/2022 Negative  Negative Final    Glucose, UA 02/10/2022 Negative  Negative Final     Ketones, UA 02/10/2022 Negative  Negative Final    Bilirubin (UA) 02/10/2022 Negative  Negative Final    Occult Blood UA 02/10/2022 Negative  Negative Final    Urobilinogen, UA 02/10/2022 Negative  Negative Final    Nitrite, UA 02/10/2022 Negative  Negative Final    Leukocytes, UA 02/10/2022 Negative  Negative Final    Protein, UA 02/10/2022 NEGATIVE   Final    Glucose, UA 02/10/2022 NEGATIVE   Final    Ketones, UA 02/10/2022 NEGATIVE   Final    Bilirubin, UA 02/10/2022 NEGATIVE   Final    Blood, UA 02/10/2022 NEGATIVE   Final    Urobilinogen, UA 02/10/2022 1.0   Final    Leukocyte Esterase, UA 02/10/2022 NEGATIVE   Final    Sodium Level 02/10/2022 139  136 - 145 Final    Potassium Level 02/10/2022 4.3  3.5 - 5.1 Final    Chloride 02/10/2022 100  98 - 107 Final    Carbon Dioxide 02/10/2022 28  22 - 29 Final    Glucose Level 02/10/2022 86  74 - 100 Final    Blood Urea Nitrogen 02/10/2022 6.2  8.4 - 25.7 Final    Creatinine 02/10/2022 1.08  0.72 - 1.25 Final    Calcium Level Total 02/10/2022 9.3  8.4 - 10.2 Final    Albumin Level 02/10/2022 3.5  3.5 - 5.0 Final    Protein Total 02/10/2022 7.8  6.4 - 8.3 Final    Globulin 02/10/2022 4.3  2.4 - 3.5 Final    Albumin/Globulin Ratio 02/10/2022 0.8  1.1 - 2.0 Final    Alkaline Phosphatase 02/10/2022 70  40 - 150 Final    Bilirubin Total 02/10/2022 0.4  0.2 - 1.2 Final    Bilirubin Direct 02/10/2022 0.2  0.0 - 0.5 Final    Bilirubin Indirect 02/10/2022 0.20  0.00 - 0.80 Final    Aspartate Aminotransferase 02/10/2022 23  5 - 34 Final    Alanine Aminotransferase 02/10/2022 24  0 - 55 Final    Hemolysis 02/10/2022 56   Final    Icterus 02/10/2022 0   Final    Lipemia 02/10/2022 4   Final    Cholesterol Total 02/10/2022 164  <=200 Final    HDL Cholesterol 02/10/2022 27  40 - 60 Final    Triglyceride 02/10/2022 175  0 - 150 Final    Very Low Density Lipoprotein 02/10/2022 35   Final    Cholesterol/HDL Ratio 02/10/2022 6  0 - 5 Final    LDL  Cholesterol 02/10/2022 102.00  50.00 - 140.00 Final    Estimated GFR- 02/10/2022 >60   Final    Estimated GFR-Non  02/10/2022 >60   Final    Prostate Specific Antigen 02/10/2022 6.60  <=4.00 Final       CT Chest Without Contrast  Narrative: EXAMINATION:  CT CHEST WITHOUT CONTRAST    CLINICAL HISTORY:  Other nonspecific abnormal finding of lung fieldLung nodule, > 8mm;    TECHNIQUE:  Helical acquisition through the chest performed without IV contrast. Three plane reconstructions made available for review.  mGycm. Automatic exposure control, adjustment of mA/kV or iterative reconstruction technique was used to reduce radiation.    COMPARISON:  7 March 2022    FINDINGS:  There is no mediastinal, hilar or axillary lymphadenopathy by size criteria.  There is gynecomastia.    Heart is normal in size. Aorta and pulmonary artery are normal in caliber.  There are coronary artery calcifications.    No pleural effusion.  Right upper lobe irregular consolidation with cavitation near completely resolved with minimal residual scarring here images 16-21 series 8.  Some linear opacities in the right middle lobe are also improved.  No new suspicious pulmonary nodule is seen.  There is paraseptal predominant emphysema.    Normal adrenals.  There is colonic diverticulosis.  Minimal degenerative change of the spine.  Impression: Near complete interval resolution of irregular right upper lobe consolidation.  No new suspicious findings.    Electronically signed by: Alberto Saunders  Date:    06/01/2022  Time:    10:38       Assessment:     1. Fatigue, unspecified type    2. Daytime somnolence    3. Primary insomnia    4. Primary hypertension    5. Mixed hyperlipidemia    6. Gastroesophageal reflux disease without esophagitis    7. Generalized anxiety disorder    8. MDD (major depressive disorder), recurrent, in partial remission    9. Chronic pain syndrome    10. Cigarette smoker    11.  Hyperlipidemia, unspecified hyperlipidemia type    12. Gastroesophageal reflux disease, unspecified whether esophagitis present      Plan:   I have discontinued Tavo Pillai's omeprazole. I am also having him maintain his HYDROcodone-acetaminophen, venlafaxine, dutasteride, nicotine, nicotine (polacrilex), albuterol, amitriptyline, atorvastatin, hydroCHLOROthiazide, and pantoprazole.  Problem List Items Addressed This Visit     Generalized anxiety disorder (Chronic)    MDD (major depressive disorder), recurrent, in partial remission (Chronic)    Fatigue - Primary (Chronic)    Relevant Orders    TSH    Home Sleep Studies    Gastroesophageal reflux disease (Chronic)    Relevant Medications    pantoprazole (PROTONIX) 40 MG tablet    Hypertension (Chronic)    Relevant Medications    hydroCHLOROthiazide (HYDRODIURIL) 25 MG tablet    Other Relevant Orders    CBC Auto Differential    Comprehensive Metabolic Panel    Lipid Panel    Urinalysis, Reflex to Urine Culture Urine, Clean Catch    Mixed hyperlipidemia (Chronic)    Relevant Orders    Comprehensive Metabolic Panel    Lipid Panel    Primary insomnia (Chronic)    Relevant Orders    TSH    Home Sleep Studies    Chronic pain (Chronic)    Daytime somnolence    Relevant Orders    TSH    Home Sleep Studies      Other Visit Diagnoses     Cigarette smoker        Relevant Orders    Ambulatory referral/consult to Smoking Cessation Program    Hyperlipidemia, unspecified hyperlipidemia type        Relevant Medications    atorvastatin (LIPITOR) 10 MG tablet        Follow up in about 3 months (around 11/18/2022).    Tavo was seen today for follow-up.    Diagnoses and all orders for this visit:    Fatigue, unspecified type  Daytime somnolence  Primary insomnia  -     TSH; Future  -     Home Sleep Studies; Future  - Uncertain etiology.  - Sleep study to assess for RODRIGUEZ  - If RODRIGUEZ absent, refer to sleep medicine for additional assessment, will likely prescribe trazodone in the  interim    Primary hypertension  -     CBC Auto Differential; Future  -     Comprehensive Metabolic Panel; Future  -     Lipid Panel; Future  -     Urinalysis, Reflex to Urine Culture Urine, Clean Catch  - Continue current prescription medications. Refills as needed   Condition/Symptoms controlled   RTC 6 months (as scheduled) or PRN    Mixed hyperlipidemia  -     Comprehensive Metabolic Panel; Future  -     Lipid Panel; Future  - Surveillance labs ordered  - Continue current prescription medications. Refills as needed   Condition/Symptoms controlled   RTC 6 months (as scheduled) or PRN    Gastroesophageal reflux disease without esophagitis   Continue current prescription medications. Refills as needed   Condition/Symptoms controlled   RTC 6 months (as scheduled) or PRN    Generalized anxiety disorder   Continue current prescription medications. Refills as needed   Condition/Symptoms controlled   RTC 6 months (as scheduled) or PRN    MDD (major depressive disorder), recurrent, in partial remission   Continue current prescription medications. Refills as needed   Condition/Symptoms controlled   RTC 6 months (as scheduled) or PRN    Chronic pain syndrome   Continue current prescription medications. Refills as needed   Condition/Symptoms controlled   RTC 6 months (as scheduled) or PRN    Cigarette smoker  -     Ambulatory referral/consult to Smoking Cessation Program; Future      Medications Ordered This Encounter   Medications    atorvastatin (LIPITOR) 10 MG tablet     Sig: Take 1 tablet (10 mg total) by mouth once daily.     Dispense:  90 tablet     Refill:  1    hydroCHLOROthiazide (HYDRODIURIL) 25 MG tablet     Sig: Take 1 tablet (25 mg total) by mouth once daily.     Dispense:  90 tablet     Refill:  1     .    pantoprazole (PROTONIX) 40 MG tablet     Sig: Take 1 tablet (40 mg total) by mouth once daily.     Dispense:  90 tablet     Refill:  1     [unfilled]  Orders Placed This Encounter   Procedures    CBC  Auto Differential     Standing Status:   Future     Standing Expiration Date:   10/17/2023    Comprehensive Metabolic Panel     Standing Status:   Future     Standing Expiration Date:   10/17/2023    Lipid Panel     Standing Status:   Future     Standing Expiration Date:   10/17/2023    Urinalysis, Reflex to Urine Culture Urine, Clean Catch     Order Specific Question:   Preferred Collection Type     Answer:   Urine, Clean Catch     Order Specific Question:   Specimen Source     Answer:   Urine    TSH     Standing Status:   Future     Standing Expiration Date:   10/17/2023    Ambulatory referral/consult to Smoking Cessation Program     Standing Status:   Future     Standing Expiration Date:   9/18/2023     Referral Priority:   Routine     Referral Type:   Consultation     Referral Reason:   Specialty Services Required     Requested Specialty:   CTTS     Number of Visits Requested:   1    Home Sleep Studies     Standing Status:   Future     Standing Expiration Date:   8/18/2023       Medication List with Changes/Refills   Current Medications    ALBUTEROL (PROVENTIL/VENTOLIN HFA) 90 MCG/ACTUATION INHALER    Inhale 2 puffs into the lungs every 6 (six) hours as needed for Wheezing.    AMITRIPTYLINE (ELAVIL) 25 MG TABLET    Take 25 mg by mouth every evening.    DUTASTERIDE (AVODART) 0.5 MG CAPSULE    Take 1 capsule (0.5 mg total) by mouth once daily.    HYDROCODONE-ACETAMINOPHEN (NORCO)  MG PER TABLET    Take 1 tablet by mouth 3 (three) times daily.    NICOTINE (NICODERM CQ) 21 MG/24 HR    Place 1 patch onto the skin once daily.    NICOTINE, POLACRILEX, (NICORETTE) 2 MG GUM    Take 1 each (2 mg total) by mouth as needed (Do not exceed more than 10 pieces in 24 hours).    VENLAFAXINE (EFFEXOR-XR) 150 MG CP24    Take 150 mg by mouth once daily.   Changed and/or Refilled Medications    Modified Medication Previous Medication    ATORVASTATIN (LIPITOR) 10 MG TABLET atorvastatin (LIPITOR) 10 MG tablet       Take 1  tablet (10 mg total) by mouth once daily.    Take 1 tablet (10 mg total) by mouth once daily.    HYDROCHLOROTHIAZIDE (HYDRODIURIL) 25 MG TABLET hydroCHLOROthiazide (HYDRODIURIL) 25 MG tablet       Take 1 tablet (25 mg total) by mouth once daily.    Take 1 tablet (25 mg total) by mouth once daily.    PANTOPRAZOLE (PROTONIX) 40 MG TABLET pantoprazole (PROTONIX) 40 MG tablet       Take 1 tablet (40 mg total) by mouth once daily.    Take 1 tablet (40 mg total) by mouth once daily.   Discontinued Medications    OMEPRAZOLE (PRILOSEC) 40 MG CAPSULE    Take 40 mg by mouth Daily.    TADALAFIL (CIALIS) 5 MG TABLET    Take 1 tablet (5 mg total) by mouth daily as needed for Erectile Dysfunction.      Medication List with Changes/Refills   Current Medications    ALBUTEROL (PROVENTIL/VENTOLIN HFA) 90 MCG/ACTUATION INHALER    Inhale 2 puffs into the lungs every 6 (six) hours as needed for Wheezing.       Start Date: 1/28/2022 End Date: --    AMITRIPTYLINE (ELAVIL) 25 MG TABLET    Take 25 mg by mouth every evening.       Start Date: --        End Date: --    DUTASTERIDE (AVODART) 0.5 MG CAPSULE    Take 1 capsule (0.5 mg total) by mouth once daily.       Start Date: 6/13/2022 End Date: 6/13/2023    HYDROCODONE-ACETAMINOPHEN (NORCO)  MG PER TABLET    Take 1 tablet by mouth 3 (three) times daily.       Start Date: 1/8/2022  End Date: --    NICOTINE (NICODERM CQ) 21 MG/24 HR    Place 1 patch onto the skin once daily.       Start Date: 8/4/2022  End Date: --    NICOTINE, POLACRILEX, (NICORETTE) 2 MG GUM    Take 1 each (2 mg total) by mouth as needed (Do not exceed more than 10 pieces in 24 hours).       Start Date: 8/4/2022  End Date: --    VENLAFAXINE (EFFEXOR-XR) 150 MG CP24    Take 150 mg by mouth once daily.       Start Date: 3/30/2022 End Date: --   Changed and/or Refilled Medications    Modified Medication Previous Medication    ATORVASTATIN (LIPITOR) 10 MG TABLET atorvastatin (LIPITOR) 10 MG tablet       Take 1 tablet (10  mg total) by mouth once daily.    Take 1 tablet (10 mg total) by mouth once daily.       Start Date: 8/18/2022 End Date: --    Start Date: 7/11/2022 End Date: 8/18/2022    HYDROCHLOROTHIAZIDE (HYDRODIURIL) 25 MG TABLET hydroCHLOROthiazide (HYDRODIURIL) 25 MG tablet       Take 1 tablet (25 mg total) by mouth once daily.    Take 1 tablet (25 mg total) by mouth once daily.       Start Date: 8/18/2022 End Date: 2/14/2023    Start Date: 7/11/2022 End Date: 8/18/2022    PANTOPRAZOLE (PROTONIX) 40 MG TABLET pantoprazole (PROTONIX) 40 MG tablet       Take 1 tablet (40 mg total) by mouth once daily.    Take 1 tablet (40 mg total) by mouth once daily.       Start Date: 8/18/2022 End Date: --    Start Date: 7/11/2022 End Date: 8/18/2022   Discontinued Medications    OMEPRAZOLE (PRILOSEC) 40 MG CAPSULE    Take 40 mg by mouth Daily.       Start Date: --        End Date: 8/18/2022    TADALAFIL (CIALIS) 5 MG TABLET    Take 1 tablet (5 mg total) by mouth daily as needed for Erectile Dysfunction.       Start Date: 5/19/2022 End Date: 8/18/2022

## 2023-08-05 NOTE — ED PROVIDER NOTES
"Encounter Date: 2023       History     Chief Complaint   Patient presents with    Abdominal Pain     Ovarian cysts; seen by pcp on Monday and referred to gyn.  Pain "12+" per patient.  Given RX- Mobic that pt states not working well.      47 y.o. female with history of Behcet's syndrome, migraines, fibromyalgia, fatigue, chronic neck pain s/p cervical spinal fusion pw abdominal pain.  She reports lower abdominal pain bilaterally that began on Wednesday.  States it is waxing waning and intermittent but worse or often that is better.  She went to see her PCP who prescribed her meloxicam but patient reports no relief. Pt reports diarrhea since July. She states she was started on diabetic medication approximately 2 months ago.  Reports nausea but denies vomiting.  Reports had a hysterectomy in the past.  Patient denies fevers, chills, chest pain, shortness of breath, vaginal bleeding, vaginal discharge or any other symptoms.  Denies slurred speech, facial asymmetry, unilateral weakness or numbness.  Past surgical history includes 3 C sections and hysterectomy.  Psychiatric history includes depression, anxiety, schizoaffective disorder, bipolar type (EMR also notes bipolar disorder and schizophrenia).     The history is provided by the patient. No  was used.     Review of patient's allergies indicates:   Allergen Reactions    Penicillins Hives    Banana Nausea Only    Potato Nausea Only    Tomato (solanum lycopersicum) Nausea Only     Past Medical History:   Diagnosis Date    Allergy     Anxiety     Behcet's     Depression     with psychosis    Migraine headache      Past Surgical History:   Procedure Laterality Date     SECTION      SPINE SURGERY      cervical fusion C6 to cranium    TONSILLECTOMY, ADENOIDECTOMY      TOTAL ABDOMINAL HYSTERECTOMY      still has ovaries     Family History   Problem Relation Age of Onset    Hypertension Mother     Hyperlipidemia Mother     Cataracts Mother  " "   Cancer Maternal Grandmother         bone    Cancer Father     No Known Problems Brother     Allergies Daughter     No Known Problems Son     No Known Problems Maternal Uncle     No Known Problems Paternal Aunt     No Known Problems Paternal Uncle     No Known Problems Daughter     Amblyopia Neg Hx     Blindness Neg Hx     Diabetes Neg Hx     Glaucoma Neg Hx     Macular degeneration Neg Hx     Retinal detachment Neg Hx     Strabismus Neg Hx     Stroke Neg Hx     Thyroid disease Neg Hx      Social History     Tobacco Use    Smoking status: Some Days     Current packs/day: 0.25     Average packs/day: 0.3 packs/day for 33.0 years (8.3 ttl pk-yrs)     Types: Cigarettes    Smokeless tobacco: Never    Tobacco comments:     started smoking again, 3 months   Substance Use Topics    Alcohol use: Yes     Alcohol/week: 28.0 standard drinks of alcohol     Types: 28 Cans of beer per week     Comment: now only occasionally, quit after DUI 2012    Drug use: Not Currently     Types: Amphetamines, "Crack" cocaine, Cocaine, Codeine, Hydrocodone, Morphine, Marijuana     Comment: rehab, clean years ago 2010     Review of Systems    Physical Exam     Initial Vitals [08/05/23 1506]   BP Pulse Resp Temp SpO2   (!) 128/58 75 16 97.6 °F (36.4 °C) 97 %      MAP       --         Physical Exam    Nursing note and vitals reviewed.  Constitutional: She appears well-developed. She is not diaphoretic. No distress.   HENT:   Head: Normocephalic and atraumatic.   Nose: Nose normal.   Eyes: EOM are normal. Pupils are equal, round, and reactive to light. No scleral icterus.   Neck: Neck supple.   Normal range of motion.  Cardiovascular:  Normal rate, regular rhythm, normal heart sounds and intact distal pulses.     Exam reveals no gallop and no friction rub.       No murmur heard.  Pulmonary/Chest: Breath sounds normal. No stridor. No respiratory distress. She has no wheezes. She has no rhonchi. She has no rales.   Abdominal: Abdomen is soft. Bowel " sounds are normal. She exhibits no distension. There is abdominal tenderness (Lower abdominal tenderness bilaterally). There is no rebound and no guarding.   Musculoskeletal:         General: Normal range of motion.      Cervical back: Normal range of motion and neck supple.     Neurological: She is alert and oriented to person, place, and time. She has normal strength. No cranial nerve deficit or sensory deficit.   Skin: Skin is warm and dry. Capillary refill takes less than 2 seconds. No rash noted.   Psychiatric: She has a normal mood and affect.         ED Course   Procedures  Labs Reviewed   CBC W/ AUTO DIFFERENTIAL - Abnormal; Notable for the following components:       Result Value    MCH 31.5 (*)     All other components within normal limits   COMPREHENSIVE METABOLIC PANEL - Abnormal; Notable for the following components:    CO2 18 (*)     BUN 5 (*)     Alkaline Phosphatase 45 (*)     All other components within normal limits   LIPASE   URINALYSIS, REFLEX TO URINE CULTURE          Imaging Results              CT Abdomen Pelvis With Contrast (Final result)  Result time 08/05/23 17:11:52      Final result by Truman Day MD (08/05/23 17:11:52)                   Impression:      1. Multiple mildly dilated loops of large and small bowel containing fluid without evidence of focal obstruction or inflammation.  Correlate for clinical history of potential diarrhea.  Mild-to-moderate solid stool within the distal colon/rectum.  2. Small liver hypodensities statistically representative of small cysts or hemangiomas in the absence of any clinical history of malignancy.  3. Hysterectomy changes.  4. No other significant findings.      Electronically signed by: Truman Day  Date:    08/05/2023  Time:    17:11               Narrative:    EXAMINATION:  CT ABDOMEN PELVIS WITH CONTRAST    CLINICAL HISTORY:  Abdominal pain, acute, nonlocalized;    TECHNIQUE:  Low dose axial images, sagittal and coronal reformations were  obtained from the lung bases to the pubic symphysis following the IV administration of 75 mL of Omnipaque 350 contrast.    COMPARISON:  Ultrasound 03/24/2023 and 04/22/2013.    FINDINGS:  Lower thorax: Visualized portions of the heart are within normal limits.    Lung bases:   No consolidation or pleural effusion.    Liver: Liver is normal in size.  Attenuation is within normal limits.  Scattered left and right hepatic lobe hypodensities, the largest of which measures 1.4 cm within the right hepatic lobe statistically representing small cysts or potential hemangiomas in the absence of any history of malignancy.  No enhancing lesions.  Main portal vein is patent.    Gallbladder: No calcified gallstones. No pericholecystic inflammatory change.    Bile Ducts: No evidence of intra or extra hepatic biliary ductal dilation.    Pancreas: No discrete mass or peripancreatic fat stranding.    Stomach: No significant CT abnormality.    Spleen: Normal in size and attenuation.  No focal lesion.    Adrenals: Adrenal glands appear within normal limits.    Kidneys: Kidneys are normal in size and enhance appropriately. No enhancing mass or hydronephrosis.    Bladder: No abnormal bladder wall thickening.    Reproductive: Hysterectomy changes.    Bowel/Mesentery: Multiple mildly prominent loops of large and small bowel containing fluid without evidence of focal inflammatory changes or obstruction.  Appendix is not definitively seen.  No evidence of appendicitis.  Mild to moderate solid stool within the distal colon/rectum.  No free air identified.  No ascites.    Lymph nodes: No pathologically enlarged lymph nodes.    Vascular:  No abdominal aortic aneurysm.    Abdominal Wall/Soft Tissues: No significant abnormalities.    Bones: Degenerative change of the spine most notable at L5-S1.  No acute bony process.                                       Medications   iohexoL (OMNIPAQUE 350) 350 mg iodine/mL injection (75 mLs Intravenous Given  8/5/23 1641)   ketorolac injection 15 mg (15 mg Intravenous Given 8/5/23 1647)     Medical Decision Making:   ED Management:  Patient's symptoms not typical for other emergent causes of abdominal pain such as, but not limited to, appendicitis, abdominal aortic aneurysm, surgical biliary disease, pancreatitis, SBO, mesenteric ischemia, serious intra-abdominal bacterial illness. Presentation also not typical of gynecologic emergencies such as TOA, Ovarian Torsion, PID.  Not Ectopic.  Doubt atypical ACS.  CT as above discussed in detail with patient.  Pt tolerating PO and pain improved.  Patient will be discharged with strict return precautions and follow up closely with PCP/Gyn for further evaluation. Pt understands and agrees with discharge instructions. Pt also given strict return precautions for any new or worsening symptoms and plans to follow up closely with PCP.                ED Course as of 08/05/23 1750   Sat Aug 05, 2023   1717 CT Abdomen Pelvis With Contrast  Impression:     1. Multiple mildly dilated loops of large and small bowel containing fluid without evidence of focal obstruction or inflammation.  Correlate for clinical history of potential diarrhea.  Mild-to-moderate solid stool within the distal colon/rectum.  2. Small liver hypodensities statistically representative of small cysts or hemangiomas in the absence of any clinical history of malignancy.  3. Hysterectomy changes.  4. No other significant findings.        Electronically signed by: Truman Day  Date:                                            08/05/2023  Time:                                           17:11 [BD]      ED Course User Index  [BD] Mitch Chino MD                 Clinical Impression:   Final diagnoses:  [R10.30] Lower abdominal pain (Primary)  [R19.7] Diarrhea, unspecified type        ED Disposition Condition    Discharge Stable          ED Prescriptions    None       Follow-up Information       Follow up With Specialties  Details Why Contact Info Additional Information    Rosy Metcalf MD Internal Medicine Go to   [address]  [address]  [facility] [address] [phone]       Kai Givens MD Gastroenterology Schedule an appointment as soon as possible for a visit   [address]  [address]  [facility] [address]  [phone]       [facility] Emergency Medicine Go to  As needed, If symptoms worsen [address] [place] [address] [address] [place] [address] 1st floor             Mitch Chino MD  08/05/23 1088

## 2023-08-15 ENCOUNTER — PATIENT MESSAGE (OUTPATIENT)
Dept: PSYCHIATRY | Facility: CLINIC | Age: 48
End: 2023-08-15
Payer: MEDICARE

## 2023-08-15 ENCOUNTER — TELEPHONE (OUTPATIENT)
Dept: PSYCHIATRY | Facility: CLINIC | Age: 48
End: 2023-08-15
Payer: MEDICARE

## 2023-08-15 NOTE — TELEPHONE ENCOUNTER
Tried calling patient to schedule np appt for med man based off referral.   Phone out of service.   Mychart msg sent

## 2023-09-23 DIAGNOSIS — G43.E09 CHRONIC MIGRAINE WITH AURA: ICD-10-CM

## 2023-09-23 DIAGNOSIS — M79.18 MYOFASCIAL PAIN: ICD-10-CM

## 2023-09-23 DIAGNOSIS — G44.40 REBOUND HEADACHE: ICD-10-CM

## 2023-09-25 RX ORDER — GALCANEZUMAB 120 MG/ML
120 INJECTION, SOLUTION SUBCUTANEOUS
Qty: 1 ML | Refills: 3 | Status: SHIPPED | OUTPATIENT
Start: 2023-09-25

## 2023-10-16 ENCOUNTER — TELEPHONE (OUTPATIENT)
Dept: NEUROLOGY | Facility: CLINIC | Age: 48
End: 2023-10-16
Payer: MEDICARE

## 2023-10-16 DIAGNOSIS — E16.2 HYPOGLYCEMIA: ICD-10-CM

## 2023-10-16 NOTE — TELEPHONE ENCOUNTER
----- Message from Romana Arriola sent at 10/16/2023  9:12 AM CDT -----  Regarding: Callback  Type:  Needs Medical Advice    Who Called: Candi[employer]      Would the patient rather a call back or a response via [facility]? Callback    Best Call Back Number: [phone]    Additional Information: Need confirmation of the fax that was sent for the Optic Cervical collar sent 10/3. Please advise ----------------------- thank you

## 2023-10-17 RX ORDER — BLOOD-GLUCOSE CONTROL, NORMAL
EACH MISCELLANEOUS
Qty: 100 EACH | Refills: 3 | Status: SHIPPED | OUTPATIENT
Start: 2023-10-17

## 2023-10-24 ENCOUNTER — OFFICE VISIT (OUTPATIENT)
Dept: OBSTETRICS AND GYNECOLOGY | Facility: CLINIC | Age: 48
End: 2023-10-24
Payer: MEDICARE

## 2023-10-24 VITALS
SYSTOLIC BLOOD PRESSURE: 115 MMHG | DIASTOLIC BLOOD PRESSURE: 66 MMHG | WEIGHT: 174.63 LBS | HEART RATE: 69 BPM | BODY MASS INDEX: 25.86 KG/M2 | HEIGHT: 69 IN

## 2023-10-24 DIAGNOSIS — M79.7 FIBROMYALGIA: ICD-10-CM

## 2023-10-24 DIAGNOSIS — R10.2 PELVIC PAIN: Primary | ICD-10-CM

## 2023-10-24 DIAGNOSIS — F20.89 OTHER SCHIZOPHRENIA: ICD-10-CM

## 2023-10-24 DIAGNOSIS — N80.9 ENDOMETRIOSIS, UNSPECIFIED: ICD-10-CM

## 2023-10-24 DIAGNOSIS — G45.9 TIA (TRANSIENT ISCHEMIC ATTACK): ICD-10-CM

## 2023-10-24 PROCEDURE — 3008F PR BODY MASS INDEX (BMI) DOCUMENTED: ICD-10-PCS | Mod: CPTII,S$GLB,, | Performed by: OBSTETRICS & GYNECOLOGY

## 2023-10-24 PROCEDURE — 3074F PR MOST RECENT SYSTOLIC BLOOD PRESSURE < 130 MM HG: ICD-10-PCS | Mod: CPTII,S$GLB,, | Performed by: OBSTETRICS & GYNECOLOGY

## 2023-10-24 PROCEDURE — 99214 PR OFFICE/OUTPT VISIT, EST, LEVL IV, 30-39 MIN: ICD-10-PCS | Mod: S$GLB,,, | Performed by: OBSTETRICS & GYNECOLOGY

## 2023-10-24 PROCEDURE — 3074F SYST BP LT 130 MM HG: CPT | Mod: CPTII,S$GLB,, | Performed by: OBSTETRICS & GYNECOLOGY

## 2023-10-24 PROCEDURE — 3044F PR MOST RECENT HEMOGLOBIN A1C LEVEL <7.0%: ICD-10-PCS | Mod: CPTII,S$GLB,, | Performed by: OBSTETRICS & GYNECOLOGY

## 2023-10-24 PROCEDURE — 99214 OFFICE O/P EST MOD 30 MIN: CPT | Mod: S$GLB,,, | Performed by: OBSTETRICS & GYNECOLOGY

## 2023-10-24 PROCEDURE — 3078F DIAST BP <80 MM HG: CPT | Mod: CPTII,S$GLB,, | Performed by: OBSTETRICS & GYNECOLOGY

## 2023-10-24 PROCEDURE — 99999 PR PBB SHADOW E&M-EST. PATIENT-LVL III: CPT | Mod: PBBFAC,,, | Performed by: OBSTETRICS & GYNECOLOGY

## 2023-10-24 PROCEDURE — 3078F PR MOST RECENT DIASTOLIC BLOOD PRESSURE < 80 MM HG: ICD-10-PCS | Mod: CPTII,S$GLB,, | Performed by: OBSTETRICS & GYNECOLOGY

## 2023-10-24 PROCEDURE — 99999 PR PBB SHADOW E&M-EST. PATIENT-LVL III: ICD-10-PCS | Mod: PBBFAC,,, | Performed by: OBSTETRICS & GYNECOLOGY

## 2023-10-24 PROCEDURE — 3008F BODY MASS INDEX DOCD: CPT | Mod: CPTII,S$GLB,, | Performed by: OBSTETRICS & GYNECOLOGY

## 2023-10-24 PROCEDURE — 3044F HG A1C LEVEL LT 7.0%: CPT | Mod: CPTII,S$GLB,, | Performed by: OBSTETRICS & GYNECOLOGY

## 2023-10-24 RX ORDER — BUSPIRONE HYDROCHLORIDE 15 MG/1
15 TABLET ORAL 2 TIMES DAILY
COMMUNITY
Start: 2023-10-16

## 2023-10-24 RX ORDER — DULOXETIN HYDROCHLORIDE 30 MG/1
30 CAPSULE, DELAYED RELEASE ORAL
COMMUNITY
Start: 2023-10-16

## 2023-10-24 RX ORDER — DULOXETIN HYDROCHLORIDE 60 MG/1
60 CAPSULE, DELAYED RELEASE ORAL
COMMUNITY
Start: 2023-10-16

## 2023-10-24 RX ORDER — MELOXICAM 7.5 MG/1
7.5 TABLET ORAL
COMMUNITY
Start: 2023-08-22

## 2023-10-24 NOTE — PROGRESS NOTES
"  Subjective:   Chief Complaint:  endometriosis evaluation (Patient states Stomach pain from belly button down also has rectum pain.)       Patient ID: Linda Sullivan is a  48 y.o. female.    Contraception: Hysterectomy    Date: 10/24/2023    The patient presents today due to the following:  She reports increasing issues with pelvic pain.  The pain is primarily in the periumbilical area with radiations to the low back and perirectal area.  She is concerned regarding the possibility of "endometriosis".  In  she underwent hysterectomy for a benign uterine abnormality.  No history of abnormal Pap smears.    From a medical standpoint she has had a history of two TIAs of unclear etiology.  In regards to her pain her primary care physician placed her on meloxicam and in addition she is scheduled to see GI on 2024.  No menopausal issues.    GYN & OB History  No LMP recorded. Patient has had a hysterectomy.     Date of Last Pap: No result found  OB History          4    Para   3    Term   3            AB   1    Living   3         SAB   1    IAB        Ectopic        Multiple        Live Births   3           Obstetric Comments   Menarche age 12. LMP age  35 (postsurgical).  No history of abnormal PAP smear.   x 3               Allergies:   Review of patient's allergies indicates:   Allergen Reactions    Penicillins Hives    Banana Nausea Only    Potato Nausea Only    Tomato (solanum lycopersicum) Nausea Only       Past Medical History:   Diagnosis Date    Allergy     Anxiety     Behcet's     Depression     with psychosis    Migraine headache     -chronic; uncontrolled -currently on topamax with PRN triptan -continues to have symptoms - neurology appt pending     Schizophrenia 2022    -stay at [facility] -21    TIA (transient ischemic attack)     x 2       Past Surgical History:   Procedure Laterality Date     SECTION      x 3    SPINE " SURGERY      cervical fusion C6 to cranium    TONSILLECTOMY, ADENOIDECTOMY      TOTAL ABDOMINAL HYSTERECTOMY  2012    still has ovaries       Medications    Current Outpatient Medications:     acarbose (PRECOSE) 25 MG Tab, Take 1 tablet (25 mg total) by mouth 3 (three) times daily with meals., Disp: 270 tablet, Rfl: 3    acetaminophen (TYLENOL ORAL), Take by mouth., Disp: , Rfl:     ARIPiprazole (ABILIFY) 5 MG Tab, Take 5 mg by mouth once daily. Take one tablet at bedtime., Disp: , Rfl:     aspirin (ECOTRIN) 81 MG EC tablet, Take 81 mg by mouth once daily., Disp: , Rfl:     blood sugar diagnostic (TRUE METRIX GLUCOSE TEST STRIP) Strp, Use to check blood sugar once a day., Disp: 100 each, Rfl: 3    blood-glucose meter kit, Use as instructed, Disp: 1 each, Rfl: 0    busPIRone (BUSPAR) 15 MG tablet, Take 15 mg by mouth 2 (two) times daily., Disp: , Rfl:     CALCIUM ORAL, Take by mouth., Disp: , Rfl:     DULoxetine (CYMBALTA) 30 MG capsule, Take 30 mg by mouth., Disp: , Rfl:     DULoxetine (CYMBALTA) 60 MG capsule, Take 60 mg by mouth., Disp: , Rfl:     fexofenadine (ALLEGRA) 180 MG tablet, Take 180 mg by mouth once daily., Disp: , Rfl:     folic acid (FOLVITE) 800 MCG Tab, Take 800 mcg by mouth once daily., Disp: , Rfl:     gabapentin (NEURONTIN) 400 MG capsule, Take 400 mg by mouth 3 (three) times daily., Disp: , Rfl:     galcanezumab-gnlm (EMGALITY PEN) 120 mg/mL PnIj, Use two injections (240 mg total) subcutaneously as shown in the first month, Disp: 2 mL, Rfl: 0    galcanezumab-gnlm (EMGALITY PEN) 120 mg/mL PnIj, Inject one pen (120 mg) into the skin every 28 days. Maintenance medicine, Disp: 1 mL, Rfl: 3    glucagon (BAQSIMI) 3 mg/actuation Spry, Spray in one nostril. Repeat in other nostril in 15 min if no response., Disp: 2 each, Rfl: 1    lancets (ONETOUCH DELICA LANCETS) 33 gauge Misc, Use 1x daily., Disp: 100 each, Rfl: 3    lancets (TRUEPLUS LANCETS) 30 gauge Misc, Use to check blood sugar once a day, Disp:  "100 each, Rfl: 3    MAGNESIUM ORAL, Take by mouth., Disp: , Rfl:     magnesium oxide (MAG-OX) 400 mg (241.3 mg magnesium) tablet, Take 400 mg by mouth once daily., Disp: , Rfl:     meloxicam (MOBIC) 7.5 MG tablet, Take 7.5 mg by mouth., Disp: , Rfl:     multivit-min/iron/folic/zjh577 (HAIR, SKIN AND NAILS ADVANCED ORAL), Take by mouth., Disp: , Rfl:     multivitamin capsule, Take 1 capsule by mouth once daily., Disp: , Rfl:     phenylephrine (SUDAFED PE) 10 MG Tab, Take 10 mg by mouth every 4 (four) hours as needed., Disp: , Rfl:     QUEtiapine (SEROQUEL) 50 MG tablet, Take 50 mg by mouth every evening., Disp: , Rfl:     topiramate (TOPAMAX) 200 MG Tab, Take 200 mg by mouth once daily., Disp: , Rfl:     TURMERIC ORAL, Take by mouth., Disp: , Rfl:     venlafaxine (EFFEXOR) 37.5 MG Tab, Take 37.5 mg by mouth once. Take one tablet by mouth daily., Disp: , Rfl:     ZINC ORAL, Take by mouth., Disp: , Rfl:     Current Facility-Administered Medications:     onabotulinumtoxina injection 200 Units, 200 Units, Intramuscular, q12 weeks, Luanne Mcduffie, NP, 200 Units at 06/30/22 0925     Social History     Tobacco Use    Smoking status: Some Days     Current packs/day: 0.25     Average packs/day: 0.3 packs/day for 33.0 years (8.3 ttl pk-yrs)     Types: Cigarettes    Smokeless tobacco: Never    Tobacco comments:     started smoking again, 3 months   Substance Use Topics    Alcohol use: Yes     Alcohol/week: 28.0 standard drinks of alcohol     Types: 28 Cans of beer per week     Comment: now only occasionally, quit after DUI 2012    Drug use: Not Currently     Types: Amphetamines, "Crack" cocaine, Cocaine, Codeine, Hydrocodone, Morphine, Marijuana     Comment: rehab, clean years ago 2010       Family History   Problem Relation Age of Onset    Hypertension Mother     Hyperlipidemia Mother     Cataracts Mother     Cancer Maternal Grandmother         bone    Cancer Father     No Known Problems Brother     Allergies Daughter     " No Known Problems Son     No Known Problems Maternal Uncle     No Known Problems Paternal Aunt     No Known Problems Paternal Uncle     No Known Problems Daughter     Amblyopia Neg Hx     Blindness Neg Hx     Diabetes Neg Hx     Glaucoma Neg Hx     Macular degeneration Neg Hx     Retinal detachment Neg Hx     Strabismus Neg Hx     Stroke Neg Hx     Thyroid disease Neg Hx        Review of Systems (at today's evaluation)  Review of Systems   Constitutional:  Negative for fever and unexpected weight change.   Respiratory: Negative.     Cardiovascular:  Negative for chest pain and palpitations.   Gastrointestinal:  Negative for nausea and vomiting.   Genitourinary:  Negative for dysuria, hematuria and urgency.        Gyn as per HPI   Neurological:  Negative for headaches.        Objective:      Vitals:    10/24/23 1444   BP: 115/66   Pulse: 69     Physical Exam:   Constitutional: She appears well-developed and well-nourished.    HENT:   Head: Normocephalic.     Neck: No thyroid mass present.    Cardiovascular:  Normal rate, regular rhythm and normal heart sounds.             Pulmonary/Chest: Effort normal and breath sounds normal.        Abdominal: Soft. There is no abdominal tenderness.     Genitourinary:    Inguinal canal, vagina, right adnexa and left adnexa normal.      Pelvic exam was performed with patient supine.   The external female genitalia was normal.   No external genitalia lesions identified,Right adnexum displays no tenderness and no fullness. Left adnexum displays no tenderness and no fullness. No tenderness or bleeding in the vagina. Cervix is absent.Uterus is absent. Normal urethral meatus.Urethra findings: no tendernessBladder findings: no bladder tenderness          Musculoskeletal:      Right lower leg: No edema.      Left lower leg: No edema.      Lymphadenopathy:     She has no cervical adenopathy. No inguinal adenopathy noted on the right or left side.    Neurological: She is alert.    Skin: Skin  is warm and dry.    Psychiatric: Mood normal.         Recent Radiology Results:     3/24/2023 Routine     Narrative & Impression  EXAMINATION:  US PELVIS COMP WITH TRANSVAG NON-OB (XPD)     CLINICAL HISTORY:  Unspecified ovarian cyst, left side    FINDINGS:  Uterus: Absent     Right ovary:     Size: 3.4 x 2.8 cm     Appearance: There is a 2.8 cm simple cyst     Vascular flow: Normal.     Left ovary:     Size: 2.4 x 1.9 cm     Appearance: 2 small follicles are noted measuring 1 cm and 8 mm.  A larger cyst is not seen.     Vascular Flow: Normal.     Free Fluid:     None.     Impression:     Prior hysterectomy.  2.8 cm cyst of the right ovary.  Two follicles of the left ovary without a cyst or mass seen.    Assessment:        1. Pelvic pain    2. Endometriosis, unspecified    3. Fibromyalgia    4. Other schizophrenia    5. TIA (transient ischemic attack)      Plan:      Pelvic pain    Endometriosis, unspecified  -     MRI Pelvis W WO Contrast; Future; Expected date: 10/24/2023    Fibromyalgia    Other schizophrenia    TIA (transient ischemic attack)       Follow up After MRI and evaluation by Gastroenterology.     The above was reviewed discussed with the patient.    We discussed her history and findings on previous pelvic ultrasound.  Endometriosis an endometriosis type issues reviewed and discussed.    At this time will proceed as follows:    Given the patient's concerns regarding endometriosis and previous hysterectomy MRI of the pelvis has been ordered.  In addition we discussed non gynecologic issues which could be present including GI.    The patient will follow up after her ultrasound and after her GI evaluation    The patient's questions were answered, and she is in agreement with the current plan.     Lalito Mcclain MD  Department OBGYN [facility]

## 2023-10-26 ENCOUNTER — HOSPITAL ENCOUNTER (OUTPATIENT)
Dept: RADIOLOGY | Facility: HOSPITAL | Age: 48
Discharge: HOME | End: 2023-10-26
Attending: OBSTETRICS & GYNECOLOGY
Payer: MEDICARE

## 2023-10-26 DIAGNOSIS — N80.9 ENDOMETRIOSIS, UNSPECIFIED: ICD-10-CM

## 2023-10-26 LAB
CREAT SERPL-MCNC: 0.8 MG/DL (ref 0.5–1.4)
SAMPLE: NORMAL

## 2023-10-26 PROCEDURE — 25500020 PHARM REV CODE 255

## 2023-10-26 PROCEDURE — A9585 GADOBUTROL INJECTION: HCPCS

## 2023-10-26 PROCEDURE — 72197 MRI PELVIS W/O & W/DYE: CPT | Mod: 26,,, | Performed by: RADIOLOGY

## 2023-10-26 PROCEDURE — 72197 MRI PELVIS W WO CONTRAST: ICD-10-PCS | Mod: 26,,, | Performed by: RADIOLOGY

## 2023-10-26 PROCEDURE — 72197 MRI PELVIS W/O & W/DYE: CPT | Mod: TC

## 2023-10-26 RX ORDER — GADOBUTROL 604.72 MG/ML
INJECTION INTRAVENOUS
Status: COMPLETED
Start: 2023-10-26 | End: 2023-10-26

## 2023-10-26 RX ADMIN — GADOBUTROL 7 ML: 604.72 INJECTION INTRAVENOUS at 07:10

## 2023-10-28 PROBLEM — G45.9 TIA (TRANSIENT ISCHEMIC ATTACK): Status: ACTIVE | Noted: 2023-10-28

## 2023-10-28 PROBLEM — R41.844 EXECUTIVE FUNCTION DEFICIT: Status: RESOLVED | Noted: 2023-02-22 | Resolved: 2023-10-28

## 2023-10-28 PROBLEM — Z01.419 ENCOUNTER FOR WELL WOMAN EXAM WITH ROUTINE GYNECOLOGICAL EXAM: Status: RESOLVED | Noted: 2018-01-11 | Resolved: 2023-10-28

## 2023-11-09 ENCOUNTER — OFFICE VISIT (OUTPATIENT)
Dept: ENDOCRINOLOGY | Facility: CLINIC | Age: 48
End: 2023-11-09
Payer: MEDICARE

## 2023-11-09 ENCOUNTER — LAB VISIT (OUTPATIENT)
Dept: LAB | Facility: HOSPITAL | Age: 48
End: 2023-11-09
Attending: PHYSICIAN ASSISTANT
Payer: MEDICARE

## 2023-11-09 VITALS
HEART RATE: 70 BPM | OXYGEN SATURATION: 99 % | WEIGHT: 167.13 LBS | BODY MASS INDEX: 25.33 KG/M2 | DIASTOLIC BLOOD PRESSURE: 80 MMHG | SYSTOLIC BLOOD PRESSURE: 118 MMHG | HEIGHT: 68 IN | TEMPERATURE: 98 F

## 2023-11-09 DIAGNOSIS — E61.1 IRON DEFICIENCY: ICD-10-CM

## 2023-11-09 DIAGNOSIS — E16.2 HYPOGLYCEMIA: ICD-10-CM

## 2023-11-09 DIAGNOSIS — E16.2 HYPOGLYCEMIA: Primary | ICD-10-CM

## 2023-11-09 LAB
ALBUMIN SERPL BCP-MCNC: 4.3 G/DL (ref 3.5–5.2)
ANION GAP SERPL CALC-SCNC: 9 MMOL/L (ref 8–16)
BUN SERPL-MCNC: 9 MG/DL (ref 6–20)
CALCIUM SERPL-MCNC: 9.6 MG/DL (ref 8.7–10.5)
CHLORIDE SERPL-SCNC: 111 MMOL/L (ref 95–110)
CO2 SERPL-SCNC: 22 MMOL/L (ref 23–29)
CREAT SERPL-MCNC: 0.8 MG/DL (ref 0.5–1.4)
EST. GFR  (NO RACE VARIABLE): >60 ML/MIN/1.73 M^2
GLUCOSE SERPL-MCNC: 76 MG/DL (ref 70–110)
IRON SERPL-MCNC: 126 UG/DL (ref 30–160)
PHOSPHATE SERPL-MCNC: 4.2 MG/DL (ref 2.7–4.5)
POTASSIUM SERPL-SCNC: 3.8 MMOL/L (ref 3.5–5.1)
SATURATED IRON: 33 % (ref 20–50)
SODIUM SERPL-SCNC: 142 MMOL/L (ref 136–145)
TOTAL IRON BINDING CAPACITY: 377 UG/DL (ref 250–450)
TRANSFERRIN SERPL-MCNC: 255 MG/DL (ref 200–375)

## 2023-11-09 PROCEDURE — 3079F DIAST BP 80-89 MM HG: CPT | Mod: CPTII,S$GLB,, | Performed by: PHYSICIAN ASSISTANT

## 2023-11-09 PROCEDURE — 1159F PR MEDICATION LIST DOCUMENTED IN MEDICAL RECORD: ICD-10-PCS | Mod: CPTII,S$GLB,, | Performed by: PHYSICIAN ASSISTANT

## 2023-11-09 PROCEDURE — 80069 RENAL FUNCTION PANEL: CPT | Performed by: PHYSICIAN ASSISTANT

## 2023-11-09 PROCEDURE — 99999 PR PBB SHADOW E&M-EST. PATIENT-LVL III: ICD-10-PCS | Mod: PBBFAC,,, | Performed by: PHYSICIAN ASSISTANT

## 2023-11-09 PROCEDURE — 3079F PR MOST RECENT DIASTOLIC BLOOD PRESSURE 80-89 MM HG: ICD-10-PCS | Mod: CPTII,S$GLB,, | Performed by: PHYSICIAN ASSISTANT

## 2023-11-09 PROCEDURE — 36415 COLL VENOUS BLD VENIPUNCTURE: CPT | Mod: PO | Performed by: PHYSICIAN ASSISTANT

## 2023-11-09 PROCEDURE — 1160F PR REVIEW ALL MEDS BY PRESCRIBER/CLIN PHARMACIST DOCUMENTED: ICD-10-PCS | Mod: CPTII,S$GLB,, | Performed by: PHYSICIAN ASSISTANT

## 2023-11-09 PROCEDURE — 3008F BODY MASS INDEX DOCD: CPT | Mod: CPTII,S$GLB,, | Performed by: PHYSICIAN ASSISTANT

## 2023-11-09 PROCEDURE — 84466 ASSAY OF TRANSFERRIN: CPT | Performed by: PHYSICIAN ASSISTANT

## 2023-11-09 PROCEDURE — 99214 OFFICE O/P EST MOD 30 MIN: CPT | Mod: S$GLB,,, | Performed by: PHYSICIAN ASSISTANT

## 2023-11-09 PROCEDURE — 3008F PR BODY MASS INDEX (BMI) DOCUMENTED: ICD-10-PCS | Mod: CPTII,S$GLB,, | Performed by: PHYSICIAN ASSISTANT

## 2023-11-09 PROCEDURE — 3074F PR MOST RECENT SYSTOLIC BLOOD PRESSURE < 130 MM HG: ICD-10-PCS | Mod: CPTII,S$GLB,, | Performed by: PHYSICIAN ASSISTANT

## 2023-11-09 PROCEDURE — 1160F RVW MEDS BY RX/DR IN RCRD: CPT | Mod: CPTII,S$GLB,, | Performed by: PHYSICIAN ASSISTANT

## 2023-11-09 PROCEDURE — 3044F HG A1C LEVEL LT 7.0%: CPT | Mod: CPTII,S$GLB,, | Performed by: PHYSICIAN ASSISTANT

## 2023-11-09 PROCEDURE — 3074F SYST BP LT 130 MM HG: CPT | Mod: CPTII,S$GLB,, | Performed by: PHYSICIAN ASSISTANT

## 2023-11-09 PROCEDURE — 1159F MED LIST DOCD IN RCRD: CPT | Mod: CPTII,S$GLB,, | Performed by: PHYSICIAN ASSISTANT

## 2023-11-09 PROCEDURE — 83540 ASSAY OF IRON: CPT | Performed by: PHYSICIAN ASSISTANT

## 2023-11-09 PROCEDURE — 99214 PR OFFICE/OUTPT VISIT, EST, LEVL IV, 30-39 MIN: ICD-10-PCS | Mod: S$GLB,,, | Performed by: PHYSICIAN ASSISTANT

## 2023-11-09 PROCEDURE — 99999 PR PBB SHADOW E&M-EST. PATIENT-LVL III: CPT | Mod: PBBFAC,,, | Performed by: PHYSICIAN ASSISTANT

## 2023-11-09 PROCEDURE — 3044F PR MOST RECENT HEMOGLOBIN A1C LEVEL <7.0%: ICD-10-PCS | Mod: CPTII,S$GLB,, | Performed by: PHYSICIAN ASSISTANT

## 2023-11-09 RX ORDER — ACARBOSE 50 MG/1
50 TABLET ORAL
Qty: 270 TABLET | Refills: 3 | Status: SHIPPED | OUTPATIENT
Start: 2023-11-09 | End: 2024-01-29 | Stop reason: SDUPTHER

## 2023-11-09 NOTE — PROGRESS NOTES
CC: Hypoglycemia    HPI: Linda Sullivan is a 48 y.o. female here for hypoglycemia along with pending conditions listed in the Visit Diagnosis.     Reports episodes of dizziness, nausea, tremors. Dx in HS. She has had one episode of low blood sugar in the past few months.     No recent episodes of low bs.   + polydipsia, polyuria.   She drinks coke zero all day. Reports glucoses in . 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 Reports fatigue after eating. Pt complain of a rash on her hand. She has seen dermatology. Working as a  at [employer] now. Dose of seroquel was recently increased.    Diet:  BF-2 bf sandwiches  LH-fruit  DN-bf sandwich  Drinks coffee, gatorade.     She has three children. Hx of partial hysterectomy.    PMHx, PSHx: reviewed in epic.    Social Hx: no ETOH use. Smokes 4 packs weekly since she was 11 yo.    Wt Readings from Last 20 Encounters:   11/09/23 75.8 kg (167 lb 1.7 oz)   10/24/23 79.2 kg (174 lb 9.7 oz)   08/05/23 76.2 kg (168 lb)   05/04/23 81.1 kg (178 lb 10.9 oz)   12/15/22 79.4 kg (175 lb 0.7 oz)   11/04/22 86.4 kg (190 lb 7.6 oz)   10/25/22 82.6 kg (182 lb 1.6 oz)   09/23/22 82.6 kg (182 lb)   09/22/22 82.7 kg (182 lb 6.9 oz)   08/23/22 76.9 kg (169 lb 9.6 oz)   07/25/22 74.6 kg (164 lb 5.7 oz)   06/30/22 72 kg (158 lb 11.7 oz)   06/07/22 71.9 kg (158 lb 8.2 oz)   05/04/22 69.7 kg (153 lb 8.8 oz)   03/28/22 71.4 kg (157 lb 6.5 oz)   12/30/21 62.9 kg (138 lb 11.2 oz)   10/01/21 60.2 kg (132 lb 11.5 oz)   06/23/21 60.3 kg (133 lb)   01/25/21 62.5 kg (137 lb 12.6 oz)   08/19/20 69 kg (152 lb 1.6 oz)      ROS:   Constitutional: + wt loss 7 lbs  Eyes: No recent visual changes  Cardiovascular: Denies current anginal symptoms  Respiratory: Denies current respiratory difficulty  Gastrointestinal: Denies recent bowel disturbances  GenitoUrinary - No dysuria  Skin: No new skin rash  Neurologic: No focal neurologic complaints  Musculoskeletal: no joint pain  Endocrine: no  "polyphagia, polydipsia or polyuria  Remainder ROS negative     /80 (BP Location: Right arm, Patient Position: Sitting, BP Method: Small (Manual))   Pulse 70   Temp 98.3 °F (36.8 °C) (Oral)   Ht 5' 8" (1.727 m)   Wt 75.8 kg (167 lb 1.7 oz)   SpO2 99%   BMI 25.41 kg/m²      Personally reviewed labs below:    Lab Results   Component Value Date    TSH 2.665 10/26/2023    F5YDWQM 6.2 05/19/2006    FREET4 0.80 10/26/2023          Chemistry        Component Value Date/Time     10/26/2023 0727    K 3.7 10/26/2023 0727     (H) 10/26/2023 0727    CO2 20 (L) 10/26/2023 0727    BUN 11 10/26/2023 0727    CREATININE 0.8 10/26/2023 0727    GLU 88 10/26/2023 0727        Component Value Date/Time    CALCIUM 9.1 10/26/2023 0727    ALKPHOS 42 (L) 10/26/2023 0727    AST 12 10/26/2023 0727    ALT 11 10/26/2023 0727    BILITOT 0.6 10/26/2023 0727    ESTGFRAFRICA >60.0 07/25/2022 1341    EGFRNONAA >60.0 07/25/2022 1341         Lab Results   Component Value Date    HGBA1C 4.8 10/26/2023    HGBA1C 4.7 12/30/2021      PE:  GENERAL: middle aged female, well developed, well nourished  NECK: Supple neck, normal thyroid. No bruit  LYMPHATIC: No cervical or supraclavicular lymphadenopathy  CARDIOVASCULAR: Normal heart sounds, no pedal edema  RESPIRATORY: Normal effort, clear to auscultation  MUSC: 2+ DTR UE/LE  NEURO: steady gait, CN ll-Xll grossly intact  SKIN: + raised rash on dorsal side of right hand  PSYCH: normal mood and affect    Assessment/Plan:   1. Hypoglycemia  acarbose (PRECOSE) 50 MG Tab    Fructosamine    Hemoglobin A1C    Fructosamine    Renal Function Panel      2. Iron deficiency  Iron and TIBC        Hypoglycemia-resolved. Pt now having higher bs. Feeling very tired after eating. Will check fructosamine. Increase acarbose to 50 mg tid ac.  Iron deficiency-check iron    Fructosamine, iron   F/u in 6 mths w/ labs prior        "

## 2023-11-13 ENCOUNTER — TELEPHONE (OUTPATIENT)
Dept: OBSTETRICS AND GYNECOLOGY | Facility: CLINIC | Age: 48
End: 2023-11-13
Payer: MEDICARE

## 2023-11-13 NOTE — TELEPHONE ENCOUNTER
Attempted to contact pt to reschedule appt. Appt needs to be with Dr. Mcclain and not with Dr. Nair. No answer, mailbox full

## 2023-11-15 ENCOUNTER — TELEPHONE (OUTPATIENT)
Dept: OBSTETRICS AND GYNECOLOGY | Facility: CLINIC | Age: 48
End: 2023-11-15
Payer: MEDICARE

## 2023-11-15 NOTE — TELEPHONE ENCOUNTER
----- Message from Belem Gama sent at 11/15/2023 12:21 PM CST -----  Contact: Self  Type:  Patient Returning Call    Who Called:  Self  Who Left Message for Patient:   OLVIN GENITLE  Does the patient know what this is regarding?:  her appt she missed on Monday maybe  Best Call Back Number:  [phone]  Additional Information:   Please call the patient back at the phone number listed above to advise. Thank you!

## 2024-01-29 DIAGNOSIS — E16.2 HYPOGLYCEMIA: ICD-10-CM

## 2024-01-29 PROBLEM — G45.9 TIA (TRANSIENT ISCHEMIC ATTACK): Status: RESOLVED | Noted: 2023-10-28 | Resolved: 2024-01-29

## 2024-01-29 RX ORDER — ACARBOSE 25 MG/1
25 TABLET ORAL
Qty: 270 TABLET | Refills: 3 | Status: SHIPPED | OUTPATIENT
Start: 2024-01-29 | End: 2025-01-28

## 2024-01-29 RX ORDER — ACARBOSE 50 MG/1
50 TABLET ORAL
Qty: 270 TABLET | Refills: 3 | Status: SHIPPED | OUTPATIENT
Start: 2024-01-29 | End: 2024-01-29

## 2024-02-18 ENCOUNTER — HOSPITAL ENCOUNTER (EMERGENCY)
Facility: HOSPITAL | Age: 49
Discharge: HOME | End: 2024-02-18
Attending: EMERGENCY MEDICINE
Payer: MEDICARE

## 2024-02-18 VITALS
RESPIRATION RATE: 20 BRPM | HEART RATE: 64 BPM | SYSTOLIC BLOOD PRESSURE: 112 MMHG | BODY MASS INDEX: 25.31 KG/M2 | TEMPERATURE: 98 F | OXYGEN SATURATION: 97 % | WEIGHT: 167 LBS | DIASTOLIC BLOOD PRESSURE: 60 MMHG | HEIGHT: 68 IN

## 2024-02-18 DIAGNOSIS — R10.9 ABDOMINAL PAIN, UNSPECIFIED ABDOMINAL LOCATION: Primary | ICD-10-CM

## 2024-02-18 LAB
ALBUMIN SERPL BCP-MCNC: 4.3 G/DL (ref 3.5–5.2)
ALP SERPL-CCNC: 44 U/L (ref 55–135)
ALT SERPL W/O P-5'-P-CCNC: 13 U/L (ref 10–44)
ANION GAP SERPL CALC-SCNC: 9 MMOL/L (ref 8–16)
AST SERPL-CCNC: 12 U/L (ref 10–40)
BASOPHILS # BLD AUTO: 0.03 K/UL (ref 0–0.2)
BASOPHILS NFR BLD: 0.6 % (ref 0–1.9)
BILIRUB SERPL-MCNC: 0.6 MG/DL (ref 0.1–1)
BILIRUB UR QL STRIP: NEGATIVE
BUN SERPL-MCNC: 4 MG/DL (ref 6–20)
CALCIUM SERPL-MCNC: 9 MG/DL (ref 8.7–10.5)
CHLORIDE SERPL-SCNC: 110 MMOL/L (ref 95–110)
CLARITY UR: CLEAR
CO2 SERPL-SCNC: 21 MMOL/L (ref 23–29)
COLOR UR: COLORLESS
CREAT SERPL-MCNC: 0.8 MG/DL (ref 0.5–1.4)
DIFFERENTIAL METHOD BLD: ABNORMAL
EOSINOPHIL # BLD AUTO: 0 K/UL (ref 0–0.5)
EOSINOPHIL NFR BLD: 0.8 % (ref 0–8)
ERYTHROCYTE [DISTWIDTH] IN BLOOD BY AUTOMATED COUNT: 11.9 % (ref 11.5–14.5)
EST. GFR  (NO RACE VARIABLE): >60 ML/MIN/1.73 M^2
GLUCOSE SERPL-MCNC: 87 MG/DL (ref 70–110)
GLUCOSE UR QL STRIP: NEGATIVE
HCT VFR BLD AUTO: 37 % (ref 37–48.5)
HGB BLD-MCNC: 12.5 G/DL (ref 12–16)
HGB UR QL STRIP: NEGATIVE
IMM GRANULOCYTES # BLD AUTO: 0.01 K/UL (ref 0–0.04)
IMM GRANULOCYTES NFR BLD AUTO: 0.2 % (ref 0–0.5)
KETONES UR QL STRIP: NEGATIVE
LEUKOCYTE ESTERASE UR QL STRIP: NEGATIVE
LIPASE SERPL-CCNC: 14 U/L (ref 4–60)
LYMPHOCYTES # BLD AUTO: 1.6 K/UL (ref 1–4.8)
LYMPHOCYTES NFR BLD: 34 % (ref 18–48)
MCH RBC QN AUTO: 31.2 PG (ref 27–31)
MCHC RBC AUTO-ENTMCNC: 33.8 G/DL (ref 32–36)
MCV RBC AUTO: 92 FL (ref 82–98)
MONOCYTES # BLD AUTO: 0.4 K/UL (ref 0.3–1)
MONOCYTES NFR BLD: 8 % (ref 4–15)
NEUTROPHILS # BLD AUTO: 2.7 K/UL (ref 1.8–7.7)
NEUTROPHILS NFR BLD: 56.4 % (ref 38–73)
NITRITE UR QL STRIP: NEGATIVE
NRBC BLD-RTO: 0 /100 WBC
PH UR STRIP: 7 [PH] (ref 5–8)
PLATELET # BLD AUTO: 210 K/UL (ref 150–450)
PMV BLD AUTO: 10.2 FL (ref 9.2–12.9)
POCT GLUCOSE: 85 MG/DL (ref 70–110)
POTASSIUM SERPL-SCNC: 3.7 MMOL/L (ref 3.5–5.1)
PROT SERPL-MCNC: 7.1 G/DL (ref 6–8.4)
PROT UR QL STRIP: NEGATIVE
RBC # BLD AUTO: 4.01 M/UL (ref 4–5.4)
SODIUM SERPL-SCNC: 140 MMOL/L (ref 136–145)
SP GR UR STRIP: <1.005 (ref 1–1.03)
URN SPEC COLLECT METH UR: ABNORMAL
UROBILINOGEN UR STRIP-ACNC: NEGATIVE EU/DL
WBC # BLD AUTO: 4.74 K/UL (ref 3.9–12.7)

## 2024-02-18 PROCEDURE — 96375 TX/PRO/DX INJ NEW DRUG ADDON: CPT

## 2024-02-18 PROCEDURE — 81003 URINALYSIS AUTO W/O SCOPE: CPT | Performed by: NURSE PRACTITIONER

## 2024-02-18 PROCEDURE — 99284 EMERGENCY DEPT VISIT MOD MDM: CPT | Mod: 25

## 2024-02-18 PROCEDURE — 96374 THER/PROPH/DIAG INJ IV PUSH: CPT

## 2024-02-18 PROCEDURE — 80053 COMPREHEN METABOLIC PANEL: CPT | Performed by: NURSE PRACTITIONER

## 2024-02-18 PROCEDURE — 83690 ASSAY OF LIPASE: CPT | Performed by: NURSE PRACTITIONER

## 2024-02-18 PROCEDURE — 36415 COLL VENOUS BLD VENIPUNCTURE: CPT | Performed by: NURSE PRACTITIONER

## 2024-02-18 PROCEDURE — 82962 GLUCOSE BLOOD TEST: CPT

## 2024-02-18 PROCEDURE — 63600175 PHARM REV CODE 636 W HCPCS: Performed by: NURSE PRACTITIONER

## 2024-02-18 PROCEDURE — 25000003 PHARM REV CODE 250: Performed by: NURSE PRACTITIONER

## 2024-02-18 PROCEDURE — 96361 HYDRATE IV INFUSION ADD-ON: CPT

## 2024-02-18 PROCEDURE — 85025 COMPLETE CBC W/AUTO DIFF WBC: CPT | Performed by: NURSE PRACTITIONER

## 2024-02-18 RX ORDER — DIPHENHYDRAMINE HYDROCHLORIDE 50 MG/ML
12.5 INJECTION INTRAMUSCULAR; INTRAVENOUS
Status: COMPLETED | OUTPATIENT
Start: 2024-02-18 | End: 2024-02-18

## 2024-02-18 RX ORDER — DROPERIDOL 2.5 MG/ML
2.5 INJECTION, SOLUTION INTRAMUSCULAR; INTRAVENOUS
Status: COMPLETED | OUTPATIENT
Start: 2024-02-18 | End: 2024-02-18

## 2024-02-18 RX ORDER — SUCRALFATE 1 G/10ML
1 SUSPENSION ORAL
Status: COMPLETED | OUTPATIENT
Start: 2024-02-18 | End: 2024-02-18

## 2024-02-18 RX ADMIN — SODIUM CHLORIDE 1000 ML: 9 INJECTION, SOLUTION INTRAVENOUS at 03:02

## 2024-02-18 RX ADMIN — DIPHENHYDRAMINE HYDROCHLORIDE 12.5 MG: 50 INJECTION INTRAMUSCULAR; INTRAVENOUS at 03:02

## 2024-02-18 RX ADMIN — SUCRALFATE 1 G: 1 SUSPENSION ORAL at 03:02

## 2024-02-18 RX ADMIN — DROPERIDOL 2.5 MG: 2.5 INJECTION, SOLUTION INTRAMUSCULAR; INTRAVENOUS at 03:02

## 2024-02-18 NOTE — ED PROVIDER NOTES
Source of History:  Patient, chart    Chief complaint:  Abdominal Pain (Rectal pain )      HPI:  Linda Sullivan is a 48 y.o. female with medical history of diabetes, schizophrenia, depression, anxiety, fibromyalgia, GERD, chronic abdominal pain presenting with abdominal pain radiating to her back.  Patient states pain is a burning sensation.  Patient states pain has been ongoing for months but she has not followed up with GI or her gynecologist.  Patient states I am scattered brain and forgot.  Patient also concerned about her glucose.  Patient has been compliant with her diabetic medications but states she has been out of strips for a while and has not checked it at home.  Patient has not called her endocrinologist for refill on strips    This is the extent to the patients complaints today here in the emergency department.    ROS: As per HPI and below:  Constitutional: No fever.  No chills.  Eyes: No visual changes.  ENT: No sore throat. No ear pain    Cardiovascular: No chest pain.  Respiratory: No shortness of breath.  GI:  Positive for abdominal pain.  Positive for rectal pain.  No nausea.  No vomiting.  Genitourinary: No abnormal urination.  No dysuria.  Neurologic: No headache. No focal weakness.  No numbness.  MSK: no back pain.  Integument: No rashes or lesions.  Hematologic: No easy bruising.  Endocrine: No excessive thirst or urination.    Review of patient's allergies indicates:   Allergen Reactions    Penicillins Hives    Banana Nausea Only    Potato Nausea Only    Tomato (solanum lycopersicum) Nausea Only       PMH:  As per HPI and below:  Past Medical History:   Diagnosis Date    Allergy     Anxiety     Behcet's     Depression     with psychosis    Migraine headache     -chronic; uncontrolled -currently on topamax with PRN triptan -continues to have symptoms - neurology appt pending     Schizophrenia 02/09/2022    -stay at [facility] 12/17-12/18/21    TIA (transient ischemic  "attack)     x 2     Past Surgical History:   Procedure Laterality Date     SECTION      x 3    SPINE SURGERY      cervical fusion C6 to cranium    TONSILLECTOMY, ADENOIDECTOMY      TOTAL ABDOMINAL HYSTERECTOMY  2012    still has ovaries       Social History     Tobacco Use    Smoking status: Some Days     Current packs/day: 0.25     Average packs/day: 0.3 packs/day for 33.0 years (8.3 ttl pk-yrs)     Types: Cigarettes    Smokeless tobacco: Never    Tobacco comments:     started smoking again, 3 months   Substance Use Topics    Alcohol use: Yes     Alcohol/week: 28.0 standard drinks of alcohol     Types: 28 Cans of beer per week     Comment: now only occasionally, quit after DUI     Drug use: Not Currently     Types: Amphetamines, "Crack" cocaine, Cocaine, Codeine, Hydrocodone, Morphine, Marijuana     Comment: rehab, clean years ago        Physical Exam:    /77   Pulse (!) 58   Temp 98.2 °F (36.8 °C) (Oral)   Resp 18   Ht 5' 8" (1.727 m)   Wt 75.8 kg (167 lb)   SpO2 97%   Breastfeeding No   BMI 25.39 kg/m²   Nursing note and vital signs reviewed.  Constitutional: No acute distress.  Nontoxic  Eyes: No conjunctival injection.  Extraocular muscles are intact.  ENT: Oropharynx clear.  Normal phonation.  Cardiovascular: Regular rate and rhythm.  No murmurs. No gallops. No rubs  Respiratory: Clear to auscultation bilaterally.  Good air movement.  No wheezes.  No rhonchi. No rales. No accessory muscle use.  Abdomen:  Soft and nontender.  No rebound or guarding appreciated on exam.  Bowel sounds within normal limits.  Musculoskeletal: Good range of motion all joints.  No deformities.  Neck supple.  No meningismus.  Skin: No rashes seen.  Good turgor.  No abrasions.  No ecchymoses.  Neuro: alert and oriented x3,  no focal neurological deficits.  Psych: Appropriate, conversant    MDM    Emergent evaluation of a 49 yo female presenting for abdominal pain near umbilicus radiating to back.  Patient " states she has had similar pain in the past due to ovarian cysts.  Patient states she has not followed up with any of her specialists including Gynecology or GI.  Patient states I am scattered brand and forgot.  On exam pt is A&Ox3. VSS. Nonfebrile and nontoxic appearing.  Mucous membranes pink and moist. Breath sounds clear bilaterally.  Abdomen soft and nontender. No rebound or guarding appreciated on exam.   BS WNL.  Pt speaking in full sentences.  Steady gait appreciated. Cap refill < 3 seconds.      History Acquisition   Additional history was acquired from other historians.  Chart    The patient's list of active medical problems, social history, medications, and allergies as documented per RN staff has been reviewed.     Differential Diagnoses   Based on available information and the initial assessment, the working differential diagnoses considered during this evaluation include but are not limited to chronic abdominal pain, ovarian cysts, gastritis, gastroenteritis, GERD, others.    I will get labs, medicate, hydrate and reassess.      LABS     Labs Reviewed   CBC W/ AUTO DIFFERENTIAL - Abnormal; Notable for the following components:       Result Value    MCH 31.2 (*)     All other components within normal limits   COMPREHENSIVE METABOLIC PANEL - Abnormal; Notable for the following components:    CO2 21 (*)     BUN 4 (*)     Alkaline Phosphatase 44 (*)     All other components within normal limits   URINALYSIS, REFLEX TO URINE CULTURE - Abnormal; Notable for the following components:    Color, UA Colorless (*)     Specific Gravity, UA <1.005 (*)     All other components within normal limits    Narrative:     Specimen Source->Urine   LIPASE   POCT GLUCOSE   POCT GLUCOSE MONITORING CONTINUOUS     Additional Consideration   All available testing was considered during the course of this workup.  Comorbidities taken into consideration during the patient's evaluation and treatment include weight, age.    Social  determinants of health were taken into consideration during development of our treatment plan.    Medications   droPERidol injection 2.5 mg (2.5 mg Intravenous Given 2/18/24 1505)   diphenhydrAMINE injection 12.5 mg (12.5 mg Intravenous Given 2/18/24 1505)   sodium chloride 0.9% bolus 1,000 mL 1,000 mL (1,000 mLs Intravenous New Bag 2/18/24 1504)   sucralfate 100 mg/mL suspension 1 g (1 g Oral Given 2/18/24 1505)      ED Course as of 02/18/24 1704   Sun Feb 18, 2024   1510 CBC unremarkable.  No leukocytosis noted.  H&H stable 12.5 and 37.0.  Point of care glucose of 85.  [RZ]   1531 CMP unremarkable.  Lipase negative.  Awaiting urine and reassessment. [RZ]   1656 UA negative for any acute infectious process.  Patient reassessed.  Patient updated on results.  Advised to follow up with GI and Gynecology as scheduled.  Advised to take Tylenol and ibuprofen as needed for pain.  Strict return to ED precautions discussed.  Patient verbalized understanding of this plan of care.  All questions and concerns addressed. [RZ]   1701 Patient is hemodynamically stable, vital signs are normal. Discharge instructions given. Return to ED precautions discussed. Follow up as directed. Pt verbalized understanding of this plan.  Pt is stable for discharge.  [RZ]      ED Course User Index  [RZ] Lexis Colón NP             CLINICAL IMPRESSION  1. Abdominal pain, unspecified abdominal location         ED Disposition Condition    Discharge Stable            Instruction:  I see no indication of an emergent process beyond that addressed during our encounter but have duly counseled the patient/family regarding the need for prompt follow-up as well as the indications that should prompt immediate return to the emergency room should new or worrisome developments occur.  The patient/family has been provided with verbal and printed direction regarding our final diagnosis(es) as well as instructions regarding use of OTC and/or Rx medications  intended to manage the patient's aforementioned conditions including:  ED Prescriptions    None       Patient has been advised of following recommended follow-up instructions:  Follow-up Information       Follow up With Specialties Details Why Contact Info    Rosy Metcalf MD Internal Medicine Schedule an appointment as soon as possible for a visit  As needed [address]  [address]  [facility] [address]  [phone]            The patient/family communicates understanding of all this information and all remaining questions and concerns were addressed at this time.      The patient's condition did not warrant review of the  and prescription of controlled substances.      This note was created using dictation software.  This program may occasionally mistype words and phrases.         Lexis Colón, [facility]  02/18/24 4000

## 2024-02-18 NOTE — DISCHARGE INSTRUCTIONS
You were seen and evaluated in the ER today.  Your labs and imaging here is unremarkable.  Please follow-up with your PCP and gynecologist.  Please follow-up with your PCP as needed.  Please return to the ED for any worsening symptoms such as chest pain, shortness of breath, fever not controlled with Tylenol or ibuprofen or uncontrolled pain.      Our goal in the emergency department is to always give you outstanding care and exceptional service. You may receive a survey by mail or e-mail in the next week regarding your experience in our ED. We would greatly appreciate your completing and returning the survey. Your feedback provides us with a way to recognize our staff who give very good care and it helps us learn how to improve when your experience was below our aspiration of excellence.

## 2024-02-26 ENCOUNTER — HOSPITAL ENCOUNTER (EMERGENCY)
Facility: HOSPITAL | Age: 49
Discharge: HOME | End: 2024-02-27
Attending: EMERGENCY MEDICINE
Payer: MEDICARE

## 2024-02-26 DIAGNOSIS — S01.01XA LACERATION OF SCALP, INITIAL ENCOUNTER: ICD-10-CM

## 2024-02-26 DIAGNOSIS — F10.920 ALCOHOLIC INTOXICATION WITHOUT COMPLICATION: Primary | ICD-10-CM

## 2024-02-26 PROCEDURE — 99284 EMERGENCY DEPT VISIT MOD MDM: CPT | Mod: 25

## 2024-02-26 PROCEDURE — 25000003 PHARM REV CODE 250: Performed by: EMERGENCY MEDICINE

## 2024-02-26 PROCEDURE — 12001 RPR S/N/AX/GEN/TRNK 2.5CM/<: CPT

## 2024-02-26 RX ORDER — LIDOCAINE HYDROCHLORIDE 10 MG/ML
10 INJECTION INFILTRATION; PERINEURAL
Status: COMPLETED | OUTPATIENT
Start: 2024-02-26 | End: 2024-02-26

## 2024-02-26 RX ADMIN — LIDOCAINE HYDROCHLORIDE 10 ML: 10 INJECTION, SOLUTION INFILTRATION; PERINEURAL at 10:02

## 2024-02-27 VITALS
HEIGHT: 68 IN | WEIGHT: 180 LBS | RESPIRATION RATE: 24 BRPM | TEMPERATURE: 98 F | OXYGEN SATURATION: 97 % | HEART RATE: 88 BPM | BODY MASS INDEX: 27.28 KG/M2 | DIASTOLIC BLOOD PRESSURE: 56 MMHG | SYSTOLIC BLOOD PRESSURE: 101 MMHG

## 2024-02-27 NOTE — ED PROVIDER NOTES
Encounter Date: 2024       History     Chief Complaint   Patient presents with    Alcohol Intoxication     At home drinking wine.     Head Laceration     Fell and hit the foot of the pool table. Laceration to the top of head.      49-year-old female with a history of schizophrenia and depression presents with a head injury after a fall while intoxicated.  According to EMS who spoke with nursing staff patient had been at home she was drinking wine and fell and struck a pool table.  She is complaining of pain to the head and neck.  Patient continues to be significantly intoxicated at the time of my exam.    The history is provided by the EMS personnel (Nursing staff spoke to EMS). History limited by: Intoxicated.     Review of patient's allergies indicates:   Allergen Reactions    Penicillins Hives    Banana Nausea Only    Potato Nausea Only    Tomato (solanum lycopersicum) Nausea Only     Past Medical History:   Diagnosis Date    Allergy     Anxiety     Behcet's     Depression     with psychosis    Migraine headache     -chronic; uncontrolled -currently on topamax with PRN triptan -continues to have symptoms - neurology appt pending     Schizophrenia 2022    -stay at [facility] -21    TIA (transient ischemic attack)     x 2     Past Surgical History:   Procedure Laterality Date     SECTION      x 3    SPINE SURGERY      cervical fusion C6 to cranium    TONSILLECTOMY, ADENOIDECTOMY      TOTAL ABDOMINAL HYSTERECTOMY      still has ovaries     Family History   Problem Relation Age of Onset    Hypertension Mother     Hyperlipidemia Mother     Cataracts Mother     Cancer Maternal Grandmother         bone    Cancer Father     No Known Problems Brother     Allergies Daughter     No Known Problems Son     No Known Problems Maternal Uncle     No Known Problems Paternal Aunt     No Known Problems Paternal Uncle     No Known Problems Daughter     Amblyopia Neg Hx     Blindness  "Neg Hx     Diabetes Neg Hx     Glaucoma Neg Hx     Macular degeneration Neg Hx     Retinal detachment Neg Hx     Strabismus Neg Hx     Stroke Neg Hx     Thyroid disease Neg Hx      Social History     Tobacco Use    Smoking status: Some Days     Current packs/day: 0.25     Average packs/day: 0.3 packs/day for 33.0 years (8.3 ttl pk-yrs)     Types: Cigarettes    Smokeless tobacco: Never    Tobacco comments:     started smoking again, 3 months   Substance Use Topics    Alcohol use: Yes     Alcohol/week: 28.0 standard drinks of alcohol     Types: 28 Cans of beer per week     Comment: now only occasionally, quit after DUI 2012    Drug use: Not Currently     Types: Amphetamines, "Crack" cocaine, Cocaine, Codeine, Hydrocodone, Morphine, Marijuana     Comment: rehab, clean years ago 2010     Review of Systems   Reason unable to perform ROS: Intoxicated.   Skin:  Positive for wound.       Physical Exam     Initial Vitals [02/26/24 2018]   BP Pulse Resp Temp SpO2   (!) 134/103 103 (!) 24 98.2 °F (36.8 °C) 98 %      MAP       --         Physical Exam    Nursing note and vitals reviewed.  Constitutional: She appears well-developed and well-nourished. She is not diaphoretic. No distress.   Smells strongly of alcohol   HENT:   Head: Normocephalic. Head is with laceration. Head is without raccoon's eyes and without Jacobs's sign.       Laceration at the vertex of the scalp   Eyes: EOM are normal.   Neck: Neck supple.       Lower spinous process tenderness   Normal range of motion.  Cardiovascular:  Normal rate, regular rhythm and normal heart sounds.     Exam reveals no gallop and no friction rub.       No murmur heard.  Pulmonary/Chest: Breath sounds normal. No respiratory distress. She has no wheezes. She has no rhonchi. She has no rales.   Abdominal: Abdomen is soft. She exhibits no distension. There is no abdominal tenderness. There is no rebound.   Musculoskeletal:         General: Normal range of motion.      Cervical back: " Normal range of motion and neck supple. Spinous process tenderness present.     Neurological: She is alert.   Speech is slurred   Skin: Skin is warm and dry.   Psychiatric: She has a normal mood and affect. Cognition and memory are impaired. She exhibits abnormal recent memory and abnormal remote memory.         ED Course   Lac Repair    Date/Time: 2/26/2024 8:15 PM    Performed by: Isra Pineda MD  Authorized by: Isra Pineda MD    Consent:     Consent obtained:  Verbal    Consent given by:  Patient  Universal protocol:     Procedure explained and questions answered to patient or proxy's satisfaction: yes      Imaging studies available: yes      Patient identity confirmed:  Verbally with patient  Laceration details:     Location:  Scalp    Scalp location:  Crown    Length (cm):  1  Pre-procedure details:     Preparation:  Patient was prepped and draped in usual sterile fashion and imaging obtained to evaluate for foreign bodies  Exploration:     Limited defect created (wound extended): no      Hemostasis achieved with:  Direct pressure    Contaminated: no    Treatment:     Amount of cleaning:  Standard    Irrigation solution:  Sterile saline    Irrigation volume:  50ml    Visualized foreign bodies/material removed: no      Debridement:  None    Undermining:  None    Scar revision: no    Skin repair:     Repair method:  Staples    Number of staples:  1  Approximation:     Approximation:  Close  Repair type:     Repair type:  Simple    Labs Reviewed - No data to display       Imaging Results              CT Cervical Spine Without Contrast (Final result)  Result time 02/27/24 06:50:12      Final result by Tayo Decker MD (02/27/24 06:50:12)                   Impression:      Degenerative and postsurgical changes are present and unchanged compared to prior studies.  There is no acute fracture or traumatic subluxation.    Note: Preliminary results were provided by Dr. Juan Carlos Souza ([facility]).  There is no  significant discrepancy.      Electronically signed by: Tayo Decker MD  Date:    02/27/2024  Time:    06:50               Narrative:    EXAMINATION:  CT CERVICAL SPINE WITHOUT CONTRAST    CLINICAL HISTORY:  Neck trauma, intoxicated or obtunded (Age >= 16y);    TECHNIQUE:  Low dose axial images, sagittal and coronal reformations were preformed though the cervical spine.  Contrast was not administered.    COMPARISON:  Cervical spine MRI dated 08/08/2022, plain films of the cervical spine dated 05/24/2016    FINDINGS:  Vertebral column: There are extensive postsurgical changes with fusion from the occiput to C5.  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  There is fusion of the occipital condyles and C1.  There is no acute fracture.  There is marked disc space narrowing at the C7-T1 level where there is endplate sclerosis and mild osteophyte formation.  There is trace anterolisthesis of C6 on C7 and C7 on T1, unchanged.  There is no acute fracture.  There is fusion of the posterior elements in the upper cervical spine.  There is facet joint arthropathy at the C4-5, C5-6, C6-7 levels.    Spinal canal, cord, epidural space: The spinal canal is developmentally normal.  There is no abnormal epidural mass or fluid collection.    Findings by level there are postsurgical, congenital and degenerative changes discussed above.    There is left foraminal stenosis at the C3-4, C4-5, C5-6, C6-7 levels with left greater than right foraminal stenosis at the C7-T1 level due to facet joint arthropathy/overgrowth or fusion.    Soft tissues, other: The prevertebral soft tissues are grossly normal.  The airway is patent.  The lung apices are expanded and clear.  The                                       CT Head Without Contrast (Final result)  Result time 02/27/24 06:27:27      Final result by Tayo Decker MD (02/27/24 06:27:27)                   Impression:      1.  There is no acute intracranial  abnormality.  There is no hemorrhage, mass/mass effect, acute edema or ischemia. There is no acute skull fracture.    Note: Preliminary results were provided by Dr. Juan Carlos Souza ([facility]).  There is no significant discrepancy.      Electronically signed by: Tayo Decker MD  Date:    02/27/2024  Time:    06:27               Narrative:    EXAMINATION:  CT HEAD WITHOUT CONTRAST    CLINICAL HISTORY:  Head trauma, abnormal mental status (Age 19-64y);    TECHNIQUE:  Routine unenhanced axial images were obtained through the head.  Sagittal and coronal reformatted images were created.  The study is reviewed in bone and soft tissue windows.    COMPARISON:  Brain MRI dated 08/08/2022    FINDINGS:  Intracranial contents: There is no acute intracranial abnormality.  Brain volume, ventricular size and position are normal.  There is no hemorrhage or mass/mass effect.  There is a small incidental 6 mm pineal cyst.  There is no acute edema or ischemia.  The gray-white interface is preserved without obvious acute infarction.  Several tiny remote lacunar infarctions are present in the right cerebellum.  These are also seen on comparison brain MRI.  Otherwise, there are no definite regions of abnormal attenuation in the brain.  There is no dense vessel.  There is no abnormal extra-axial fluid collection.  The basilar cisterns are open.  Prior imaging studies better demonstrate previous upper cervical spine fusion with hypoplastic odontoid process.  Sellar structures are normal.  The orbits are normal.    Extracranial contents, calvarium, soft tissues: The calvarium is normal.  There is no acute fracture.  There is a right frontal scalp contusion.  The included paranasal sinuses and mastoid air cells are clear.                                       Medications   LIDOcaine HCL 10 mg/ml (1%) injection 10 mL (10 mLs Infiltration Given by Other 2/26/24 2218)     Medical Decision Making  48 y/o mental health hx,etoh tonight, fall, small  scalp lac, stapled, CTs neg, took hours to sober up, clinically sober at 7am, no sign of other injury, will DC home with ride.    Amount and/or Complexity of Data Reviewed  Independent Historian: EMS  External Data Reviewed: notes.  Radiology: ordered. Decision-making details documented in ED Course.    Risk  Prescription drug management.               ED Course as of 02/27/24 1536   Mon Feb 26, 2024 2205 BP(!): 134/103 [EF]   2205 Temp: 98.2 °F (36.8 °C) [EF]   2205 Temp Source: Oral [EF]   2205 Pulse: 103 [EF]   2205 Resp(!): 24 [EF]   2205 SpO2: 98 % [EF]   2253 Clinically more sober at this time [EF]   2259 Staple placed [EF]   2327 IMPRESSION: 1. No acute bone findings are seen within the cervical spine. 2. Persistent fusion of C1 through C5 vertebrae. This is associated with fusion of the ex epidural condyles with C1. This could be due to prior surgery or trauma. 3. Minimal degenerative changes in the lower cervical spine. Dictated and Authenticated by: Juan Carlos Souza MD 02/26/2024 11:04 PM Central Time ([place] & [place]   [EF]   2327 IMPRESSION: 1. No acute intracranial findings. 2. Right frontal scalp small subcutaneous induration. This could be due to a hematoma or swelling. Dictated and Authenticated by: Juan Carlos Souza MD 02/26/2024 10:14 PM Central Time ([place] & [place])   [EF]   e Feb 27, 2024   0631 Ambulatory around the ER, clinically sober she can be discharged home when she finds a ride. [EF]   0631 CT Head Without Contrast [EF]      ED Course User Index  [EF] Isra Pineda MD                           Clinical Impression:  Final diagnoses:  [F10.920] Alcoholic intoxication without complication (Primary)  [S01.01XA] Laceration of scalp, initial encounter          ED Disposition Condition    Discharge Stable          ED Prescriptions    None       Follow-up Information       Follow up With Specialties Details Why Contact Info Additional Information    Kamala Santana MD Internal Medicine In 10 days  For suture removal [address] [facility]  [address]  [place] [address]  [phone]       [facility] -  Emergency Medicine  As needed, If symptoms worsen [address] [place] [address] [address] [place] [address] 1st floor             Isra Pineda MD  02/27/24 9171

## 2024-02-27 NOTE — FIRST PROVIDER EVALUATION
Emergency Department TeleTriage Encounter Note      CHIEF COMPLAINT    Chief Complaint   Patient presents with    Alcohol Intoxication     At home drinking wine.     Head Laceration     Fell and hit the foot of the pool table. Laceration to the top of head.        VITAL SIGNS   Initial Vitals [02/26/24 2018]   BP Pulse Resp Temp SpO2   (!) 134/103 103 (!) 24 98.2 °F (36.8 °C) 98 %      MAP       --            ALLERGIES    Review of patient's allergies indicates:   Allergen Reactions    Penicillins Hives    Banana Nausea Only    Potato Nausea Only    Tomato (solanum lycopersicum) Nausea Only       PROVIDER TRIAGE NOTE  Patient   Is slurring her words and unable to give me much history.  Per EMS she fell and hit her head and had loss of consciousness.  She has been drinking alcohol.  History of prior brain surgeries.  Denies blood thinner use.      Phy:   Constitutional: well nourished, well developed, appearing stated age, NAD        Initial orders will be placed and care will be transferred to an alternate provider when patient is roomed for a full evaluation. Any additional orders and the final disposition will be determined by that provider.        ORDERS  Labs Reviewed - No data to display    ED Orders (720h ago, onward)      None              Virtual Visit Note: The provider triage portion of this emergency department evaluation and documentation was performed via [employer], a HIPAA-compliant telemedicine application, in concert with a tele-presenter in the room. A face to face patient evaluation with one of my colleagues will occur once the patient is placed in an emergency department room.      DISCLAIMER: This note was prepared with [employer] voice recognition transcription software. Garbled syntax, mangled pronouns, and other bizarre constructions may be attributed to that software system.

## 2024-02-29 ENCOUNTER — TELEPHONE (OUTPATIENT)
Dept: ENDOCRINOLOGY | Facility: CLINIC | Age: 49
End: 2024-02-29
Payer: MEDICARE

## 2024-02-29 NOTE — TELEPHONE ENCOUNTER
----- Message from Briannesteff Willis sent at 2/29/2024  1:06 PM CST -----  Regarding: Pharm Auth  Contact: [facility]  Pharmacy Calling to Clarify an RX      Name of Caller: Kenrick     Pharmacy Name:   [id number] [address] [address]  Phone: [phone] Fax: [phone]      Prescription Name: acarbose (PRECOSE) 25 MG Tab    What do they need to clarify?: Dosage     Best Call Back Number: [phone]    Additional Information: Sts is the prescription supposed to be 25 Mg or 50 Mg.  Please Advise - Thank you

## 2024-04-23 DIAGNOSIS — Z12.31 ENCOUNTER FOR SCREENING MAMMOGRAM FOR MALIGNANT NEOPLASM OF BREAST: Primary | ICD-10-CM

## 2024-05-31 ENCOUNTER — TELEPHONE (OUTPATIENT)
Dept: ENDOCRINOLOGY | Facility: CLINIC | Age: 49
End: 2024-05-31
Payer: MEDICARE

## 2024-05-31 NOTE — TELEPHONE ENCOUNTER
----- Message from Gunnar Mariee sent at 5/29/2024  1:19 PM CDT -----  Regarding: Appt request  Type:  Appointment Request    Caller is requesting an appointment.     Name of Caller:  PT    Symptoms:  f/u and labs    Would the patient rather a call back or a response via [facility]? Call back    Best Call Back Number:  [phone]      Additional Information:  Sts that she also needs lab orders sent over to [facility] so she can have her labs done and then needs a f/u appt.   Please advise -- Thank you

## 2024-06-06 ENCOUNTER — TELEPHONE (OUTPATIENT)
Dept: ENDOCRINOLOGY | Facility: CLINIC | Age: 49
End: 2024-06-06
Payer: MEDICARE

## 2024-06-06 NOTE — TELEPHONE ENCOUNTER
----- Message from Callie Ga sent at 6/6/2024 12:34 PM CDT -----  Regarding: advise  Contact: pt  Type: Needs Medical Advice  Who Called:  pt   Symptoms (please be specific):    How long has patient had these symptoms:    Pharmacy name and phone #:   Best Call Back Number: [phone]    Additional Information: wants blood work transferred to lab core are u available to assist MRN: [id number] ESTEPHANIA COBOS   Headed there now

## 2024-06-07 ENCOUNTER — LAB VISIT (OUTPATIENT)
Dept: LAB | Facility: HOSPITAL | Age: 49
End: 2024-06-07
Attending: PHYSICIAN ASSISTANT
Payer: MEDICARE

## 2024-06-07 DIAGNOSIS — E16.2 HYPOGLYCEMIA: ICD-10-CM

## 2024-06-07 LAB
ESTIMATED AVG GLUCOSE: 94 MG/DL (ref 68–131)
HBA1C MFR BLD: 4.9 % (ref 4.5–6.2)

## 2024-06-07 PROCEDURE — 83036 HEMOGLOBIN GLYCOSYLATED A1C: CPT | Performed by: PHYSICIAN ASSISTANT

## 2024-06-07 PROCEDURE — 82985 ASSAY OF GLYCATED PROTEIN: CPT | Performed by: PHYSICIAN ASSISTANT

## 2024-06-12 LAB — FRUCTOSAMINE SERPL-SCNC: 266 UMOL /L (ref 151–300)

## 2024-08-08 DIAGNOSIS — E16.2 HYPOGLYCEMIA: ICD-10-CM

## 2024-09-17 ENCOUNTER — TELEPHONE (OUTPATIENT)
Dept: OBSTETRICS AND GYNECOLOGY | Facility: CLINIC | Age: 49
End: 2024-09-17
Payer: MEDICARE

## 2024-09-17 NOTE — TELEPHONE ENCOUNTER
----- Message from Audelia Gomez sent at 9/17/2024 10:32 AM CDT -----  Contact: Patient  Type:  Appointment Request    Caller is requesting a sooner appointment.  Caller declined first available appointment listed below.  Caller will not accept being placed on the waitlist and is requesting a message be sent to doctor.    Name of Caller: Patient  When is the first available appointment?  N/A    Symptoms:  ovaries painful / ulcer on vagina / feel fluish / low grade fever / diagnosed with pelvic inflammatory disease    Would the patient rather a call back or a response via [facility]?   Call back  Best Call Back Number:   [phone]    Additional Information:   States she would like to speak with someone to schedule an appointment at the earliest - [facility] would not let me schedule - please call - thank you

## 2024-10-20 ENCOUNTER — HOSPITAL ENCOUNTER (EMERGENCY)
Facility: HOSPITAL | Age: 49
Discharge: PSYCHIATRIC OR SUBSTANCE USE FACILITY | End: 2024-10-21
Attending: EMERGENCY MEDICINE
Payer: MEDICARE

## 2024-10-20 DIAGNOSIS — F32.A DEPRESSION WITH SUICIDAL IDEATION: ICD-10-CM

## 2024-10-20 DIAGNOSIS — N39.0 URINARY TRACT INFECTION WITHOUT HEMATURIA, SITE UNSPECIFIED: Primary | ICD-10-CM

## 2024-10-20 DIAGNOSIS — Z00.8 MEDICAL CLEARANCE FOR PSYCHIATRIC ADMISSION: ICD-10-CM

## 2024-10-20 DIAGNOSIS — F41.9 ANXIETY: ICD-10-CM

## 2024-10-20 DIAGNOSIS — R45.851 DEPRESSION WITH SUICIDAL IDEATION: ICD-10-CM

## 2024-10-20 LAB
ALBUMIN SERPL BCP-MCNC: 4.9 G/DL (ref 3.5–5.2)
ALP SERPL-CCNC: 56 U/L (ref 40–150)
ALT SERPL W/O P-5'-P-CCNC: 29 U/L (ref 10–44)
AMPHET+METHAMPHET UR QL: ABNORMAL
ANION GAP SERPL CALC-SCNC: 20 MMOL/L (ref 8–16)
APAP SERPL-MCNC: <3 UG/ML (ref 10–20)
AST SERPL-CCNC: 17 U/L (ref 10–40)
B-HCG UR QL: NEGATIVE
BACTERIA #/AREA URNS HPF: ABNORMAL /HPF
BARBITURATES UR QL SCN>200 NG/ML: NEGATIVE
BASOPHILS # BLD AUTO: 0.05 K/UL (ref 0–0.2)
BASOPHILS NFR BLD: 0.7 % (ref 0–1.9)
BENZODIAZ UR QL SCN>200 NG/ML: NEGATIVE
BILIRUB SERPL-MCNC: 1.1 MG/DL (ref 0.1–1)
BILIRUB UR QL STRIP: NEGATIVE
BUN SERPL-MCNC: 9 MG/DL (ref 6–20)
BZE UR QL SCN: NEGATIVE
CALCIUM SERPL-MCNC: 10 MG/DL (ref 8.7–10.5)
CANNABINOIDS UR QL SCN: NEGATIVE
CHLORIDE SERPL-SCNC: 104 MMOL/L (ref 95–110)
CLARITY UR: CLEAR
CO2 SERPL-SCNC: 14 MMOL/L (ref 23–29)
COLOR UR: COLORLESS
CREAT SERPL-MCNC: 0.8 MG/DL (ref 0.5–1.4)
CREAT UR-MCNC: 23 MG/DL (ref 15–325)
CTP QC/QA: YES
DIFFERENTIAL METHOD BLD: ABNORMAL
EOSINOPHIL # BLD AUTO: 0 K/UL (ref 0–0.5)
EOSINOPHIL NFR BLD: 0.1 % (ref 0–8)
ERYTHROCYTE [DISTWIDTH] IN BLOOD BY AUTOMATED COUNT: 11.5 % (ref 11.5–14.5)
EST. GFR  (NO RACE VARIABLE): >60 ML/MIN/1.73 M^2
ETHANOL SERPL-MCNC: <10 MG/DL
GLUCOSE SERPL-MCNC: 93 MG/DL (ref 70–110)
GLUCOSE UR QL STRIP: NEGATIVE
HCT VFR BLD AUTO: 42.3 % (ref 37–48.5)
HCV AB SERPL QL IA: NEGATIVE
HGB BLD-MCNC: 14.9 G/DL (ref 12–16)
HGB UR QL STRIP: NEGATIVE
HIV 1+2 AB+HIV1 P24 AG SERPL QL IA: NEGATIVE
IMM GRANULOCYTES # BLD AUTO: 0.01 K/UL (ref 0–0.04)
IMM GRANULOCYTES NFR BLD AUTO: 0.1 % (ref 0–0.5)
KETONES UR QL STRIP: ABNORMAL
LEUKOCYTE ESTERASE UR QL STRIP: ABNORMAL
LYMPHOCYTES # BLD AUTO: 1.2 K/UL (ref 1–4.8)
LYMPHOCYTES NFR BLD: 15.9 % (ref 18–48)
MCH RBC QN AUTO: 31.2 PG (ref 27–31)
MCHC RBC AUTO-ENTMCNC: 35.2 G/DL (ref 32–36)
MCV RBC AUTO: 89 FL (ref 82–98)
METHADONE UR QL SCN>300 NG/ML: NEGATIVE
MICROSCOPIC COMMENT: ABNORMAL
MONOCYTES # BLD AUTO: 0.6 K/UL (ref 0.3–1)
MONOCYTES NFR BLD: 7.8 % (ref 4–15)
NEUTROPHILS # BLD AUTO: 5.7 K/UL (ref 1.8–7.7)
NEUTROPHILS NFR BLD: 75.4 % (ref 38–73)
NITRITE UR QL STRIP: NEGATIVE
NRBC BLD-RTO: 0 /100 WBC
OPIATES UR QL SCN: NEGATIVE
PCP UR QL SCN>25 NG/ML: NEGATIVE
PH UR STRIP: 6 [PH] (ref 5–8)
PLATELET # BLD AUTO: 312 K/UL (ref 150–450)
PMV BLD AUTO: 10.1 FL (ref 9.2–12.9)
POTASSIUM SERPL-SCNC: 3.1 MMOL/L (ref 3.5–5.1)
PROT SERPL-MCNC: 8.5 G/DL (ref 6–8.4)
PROT UR QL STRIP: NEGATIVE
RBC # BLD AUTO: 4.77 M/UL (ref 4–5.4)
RBC #/AREA URNS HPF: 1 /HPF (ref 0–4)
SODIUM SERPL-SCNC: 138 MMOL/L (ref 136–145)
SP GR UR STRIP: 1 (ref 1–1.03)
SQUAMOUS #/AREA URNS HPF: 3 /HPF
TOXICOLOGY INFORMATION: ABNORMAL
TSH SERPL DL<=0.005 MIU/L-ACNC: 1.36 UIU/ML (ref 0.4–4)
URN SPEC COLLECT METH UR: ABNORMAL
UROBILINOGEN UR STRIP-ACNC: NEGATIVE EU/DL
WBC # BLD AUTO: 7.56 K/UL (ref 3.9–12.7)
WBC #/AREA URNS HPF: 6 /HPF (ref 0–5)
YEAST URNS QL MICRO: ABNORMAL

## 2024-10-20 PROCEDURE — 81000 URINALYSIS NONAUTO W/SCOPE: CPT | Mod: 59 | Performed by: EMERGENCY MEDICINE

## 2024-10-20 PROCEDURE — 84443 ASSAY THYROID STIM HORMONE: CPT | Performed by: EMERGENCY MEDICINE

## 2024-10-20 PROCEDURE — 86803 HEPATITIS C AB TEST: CPT | Performed by: EMERGENCY MEDICINE

## 2024-10-20 PROCEDURE — 87389 HIV-1 AG W/HIV-1&-2 AB AG IA: CPT | Performed by: EMERGENCY MEDICINE

## 2024-10-20 PROCEDURE — 80143 DRUG ASSAY ACETAMINOPHEN: CPT | Performed by: EMERGENCY MEDICINE

## 2024-10-20 PROCEDURE — 25000003 PHARM REV CODE 250: Performed by: EMERGENCY MEDICINE

## 2024-10-20 PROCEDURE — 80053 COMPREHEN METABOLIC PANEL: CPT | Performed by: EMERGENCY MEDICINE

## 2024-10-20 PROCEDURE — 85025 COMPLETE CBC W/AUTO DIFF WBC: CPT | Performed by: EMERGENCY MEDICINE

## 2024-10-20 PROCEDURE — 36415 COLL VENOUS BLD VENIPUNCTURE: CPT | Performed by: EMERGENCY MEDICINE

## 2024-10-20 PROCEDURE — 82077 ASSAY SPEC XCP UR&BREATH IA: CPT | Performed by: EMERGENCY MEDICINE

## 2024-10-20 PROCEDURE — 81025 URINE PREGNANCY TEST: CPT | Performed by: EMERGENCY MEDICINE

## 2024-10-20 PROCEDURE — 80307 DRUG TEST PRSMV CHEM ANLYZR: CPT | Performed by: EMERGENCY MEDICINE

## 2024-10-20 PROCEDURE — G0425 INPT/ED TELECONSULT30: HCPCS | Mod: GT,,, | Performed by: STUDENT IN AN ORGANIZED HEALTH CARE EDUCATION/TRAINING PROGRAM

## 2024-10-20 PROCEDURE — 99285 EMERGENCY DEPT VISIT HI MDM: CPT

## 2024-10-20 RX ORDER — NITROFURANTOIN 25; 75 MG/1; MG/1
100 CAPSULE ORAL ONCE
Status: COMPLETED | OUTPATIENT
Start: 2024-10-20 | End: 2024-10-20

## 2024-10-20 RX ORDER — NITROFURANTOIN 25; 75 MG/1; MG/1
100 CAPSULE ORAL 2 TIMES DAILY
Qty: 20 CAPSULE | Refills: 0 | Status: ON HOLD | OUTPATIENT
Start: 2024-10-20 | End: 2024-10-30

## 2024-10-20 RX ADMIN — NITROFURANTOIN MONOHYDRATE/MACROCRYSTALS 100 MG: 25; 75 CAPSULE ORAL at 04:10

## 2024-10-20 NOTE — ED NOTES
Physician's Emergency Certificate for Linda Sullivan faxed to and called into [facility] office.  PEC scanned into chart for [facility].

## 2024-10-20 NOTE — ED PROVIDER NOTES
"Encounter Date: 10/20/2024       History     Chief Complaint   Patient presents with    Psychiatric Evaluation     States " I need to be in a hospital "     49-year-old female presents complaining of feeling that she needs psychiatric evaluation treatment and possible admission.  The patient reports she has had escalating depression and escalating feeling of anxiety.  The patient states that she feels she is on the verge of or has had a nervous breakdown.  She states she is crying constantly has had recent bad news and is overwhelmed completely with her current life events.  She has been having suicidal thoughts intermittently.  She denies any homicidal ideations, auditory or visual hallucinations and denies any delusions.  She denies any acute medical issues problems or complaints otherwise.        Review of patient's allergies indicates:   Allergen Reactions    Penicillins Hives    Banana Nausea Only    Potato Nausea Only    Tomato (solanum lycopersicum) Nausea Only     Past Medical History:   Diagnosis Date    Allergy     Anxiety     Behcet's     Depression     with psychosis    Migraine headache     -chronic; uncontrolled -currently on topamax with PRN triptan -continues to have symptoms - neurology appt pending     Schizophrenia 2022    -stay at [facility] -21    TIA (transient ischemic attack)     x 2     Past Surgical History:   Procedure Laterality Date     SECTION      x 3    SPINE SURGERY      cervical fusion C6 to cranium    TONSILLECTOMY, ADENOIDECTOMY      TOTAL ABDOMINAL HYSTERECTOMY      still has ovaries     Family History   Problem Relation Name Age of Onset    Hypertension Mother      Hyperlipidemia Mother      Cataracts Mother      Cancer Maternal Grandmother          bone    Cancer Father      No Known Problems Brother      Allergies Daughter      No Known Problems Son      No Known Problems Maternal Uncle      No Known Problems Paternal Aunt      No " "Known Problems Paternal Uncle      No Known Problems Daughter      Amblyopia Neg Hx      Blindness Neg Hx      Diabetes Neg Hx      Glaucoma Neg Hx      Macular degeneration Neg Hx      Retinal detachment Neg Hx      Strabismus Neg Hx      Stroke Neg Hx      Thyroid disease Neg Hx       Social History     Tobacco Use    Smoking status: Some Days     Current packs/day: 0.25     Average packs/day: 0.3 packs/day for 33.0 years (8.3 ttl pk-yrs)     Types: Cigarettes    Smokeless tobacco: Never    Tobacco comments:     started smoking again, 3 months   Substance Use Topics    Alcohol use: Yes     Alcohol/week: 28.0 standard drinks of alcohol     Types: 28 Cans of beer per week     Comment: now only occasionally, quit after DUI 2012    Drug use: Not Currently     Types: Amphetamines, "Crack" cocaine, Cocaine, Codeine, Hydrocodone, Morphine, Marijuana     Comment: rehab, clean years ago 2010     Review of Systems   Constitutional: Negative.  Negative for fever.   HENT: Negative.     Respiratory: Negative.     Cardiovascular: Negative.  Negative for chest pain.   Gastrointestinal: Negative.  Negative for abdominal pain, diarrhea, nausea and vomiting.   Genitourinary: Negative.  Negative for dysuria.   Musculoskeletal: Negative.    Skin: Negative.    Neurological:  Negative for dizziness, seizures, syncope, speech difficulty, light-headedness and headaches.   Psychiatric/Behavioral:  Positive for decreased concentration, dysphoric mood, sleep disturbance and suicidal ideas. The patient is nervous/anxious.    All other systems reviewed and are negative.      Physical Exam     Initial Vitals [10/20/24 1318]   BP Pulse Resp Temp SpO2   (!) 147/60 100 18 98.3 °F (36.8 °C) 100 %      MAP       --         Physical Exam    Nursing note and vitals reviewed.  Constitutional: She is cooperative. She does not appear ill. No distress.   HENT:   Head: Normocephalic and atraumatic.   Nose: Nose normal. Mouth/Throat: Uvula is midline, " oropharynx is clear and moist and mucous membranes are normal.   Eyes: Conjunctivae, EOM and lids are normal. Pupils are equal, round, and reactive to light.   Neck: Trachea normal and phonation normal. Neck supple. No stridor present. No JVD present.   Normal range of motion.   Full passive range of motion without pain.     Cardiovascular:  Normal rate, regular rhythm, normal heart sounds, intact distal pulses and normal pulses.     Exam reveals no gallop, no distant heart sounds, no friction rub and no decreased pulses.       No murmur heard.  Abdominal: Abdomen is soft. There is no abdominal tenderness.   Musculoskeletal:         General: No tenderness or edema. Normal range of motion.      Cervical back: Full passive range of motion without pain, normal range of motion and neck supple. No pain with movement. Normal range of motion.      Thoracic back: Normal range of motion.      Lumbar back: Normal range of motion.      Comments: Pulses 2+ throughout, no extremity abnormalities     Neurological: She is alert and oriented to person, place, and time. She has normal strength. No cranial nerve deficit or sensory deficit. Gait normal. GCS score is 15. GCS eye subscore is 4. GCS verbal subscore is 5. GCS motor subscore is 6.   No focal deficits   Skin: Skin is warm and dry. Capillary refill takes less than 2 seconds. No rash noted. No erythema.   Psychiatric: Her speech is normal. Her mood appears anxious. Her affect is labile. She is slowed and withdrawn. Thought content is not paranoid and not delusional. She exhibits a depressed mood. She expresses suicidal ideation. She expresses no homicidal ideation. She expresses no suicidal plans and no homicidal plans.         ED Course   Procedures  Labs Reviewed   CBC W/ AUTO DIFFERENTIAL - Abnormal       Result Value    WBC 7.56      RBC 4.77      Hemoglobin 14.9      Hematocrit 42.3      MCV 89      MCH 31.2 (*)     MCHC 35.2      RDW 11.5      Platelets 312      MPV  10.1      Immature Granulocytes 0.1      Gran # (ANC) 5.7      Immature Grans (Abs) 0.01      Lymph # 1.2      Mono # 0.6      Eos # 0.0      Baso # 0.05      nRBC 0      Gran % 75.4 (*)     Lymph % 15.9 (*)     Mono % 7.8      Eosinophil % 0.1      Basophil % 0.7      Differential Method Automated      Narrative:     Release to patient->Immediate   COMPREHENSIVE METABOLIC PANEL - Abnormal    Sodium 138      Potassium 3.1 (*)     Chloride 104      CO2 14 (*)     Glucose 93      BUN 9      Creatinine 0.8      Calcium 10.0      Total Protein 8.5 (*)     Albumin 4.9      Total Bilirubin 1.1 (*)     Alkaline Phosphatase 56      AST 17      ALT 29      eGFR >60      Anion Gap 20 (*)     Narrative:     Release to patient->Immediate   URINALYSIS, REFLEX TO URINE CULTURE - Abnormal    Specimen UA Urine, Clean Catch      Color, UA Colorless (*)     Appearance, UA Clear      pH, UA 6.0      Specific Gravity, UA 1.005      Protein, UA Negative      Glucose, UA Negative      Ketones, UA 2+ (*)     Bilirubin (UA) Negative      Occult Blood UA Negative      Nitrite, UA Negative      Urobilinogen, UA Negative      Leukocytes, UA 2+ (*)     Narrative:     Specimen Source->Urine   DRUG SCREEN PANEL, URINE EMERGENCY - Abnormal    Benzodiazepines Negative      Methadone metabolites Negative      Cocaine (Metab.) Negative      Opiate Scrn, Ur Negative      Barbiturate Screen, Ur Negative      Amphetamine Screen, Ur Presumptive Positive (*)     THC Negative      Phencyclidine Negative      Creatinine, Urine 23.0      Toxicology Information SEE COMMENT      Narrative:     Specimen Source->Urine   ACETAMINOPHEN LEVEL - Abnormal    Acetaminophen (Tylenol), Serum <3.0 (*)     Narrative:     Release to patient->Immediate   URINALYSIS MICROSCOPIC - Abnormal    RBC, UA 1      WBC, UA 6 (*)     Bacteria Occasional      Yeast, UA None      Squam Epithel, UA 3      Microscopic Comment SEE COMMENT      Narrative:     Specimen Source->Urine   HIV 1  "/ 2 ANTIBODY    HIV 1/2 Ag/Ab Negative      Narrative:     Release to patient->Immediate   HEPATITIS C ANTIBODY    Hepatitis C Ab Negative      Narrative:     Release to patient->Immediate   TSH    TSH 1.359      Narrative:     Release to patient->Immediate   ALCOHOL,MEDICAL (ETHANOL)    Alcohol, Serum <10      Narrative:     Release to patient->Immediate   POCT URINE PREGNANCY    POC Preg Test, Ur Negative       Acceptable Yes            Imaging Results    None          Medications   nitrofurantoin (macrocrystal-monohydrate) 100 MG capsule 100 mg (has no administration in time range)     Medical Decision Making  Emergent evaluation of 49-year-old female who presents overwhelming depression causing fatigue, inability to perform activities of daily living.  The patient feels that she is "losing it" and feels that she needs help.  She has had intermittent suicidal thoughts without any attempt or plan.  Pec completed, will obtain tele psychiatry input for recommendation if this should be continued and if she needs inpatient psychiatric treatment.  The psychiatrist does recommend pec/inpatient psychiatric evaluation.  Pec completed.  Patient is medically stable for psychiatric evaluation.    Amount and/or Complexity of Data Reviewed  Labs: ordered.    Risk  Prescription drug management.                  Medically cleared for psychiatry placement: 10/20/2024  4:58 PM                   Clinical Impression:  Final diagnoses:  [N39.0] Urinary tract infection without hematuria, site unspecified (Primary)  [Z00.8] Medical clearance for psychiatric admission  [F41.9] Anxiety  [F32.A, R45.851] Depression with suicidal ideation          ED Disposition Condition    Transfer to Psych Facility Stable          ED Prescriptions       Medication Sig Dispense Start Date End Date Auth. Provider    nitrofurantoin, macrocrystal-monohydrate, (MACROBID) 100 MG capsule Take 1 capsule (100 mg total) by mouth 2 (two) times " daily. for 10 days 20 capsule 10/20/2024 10/30/2024 Rosemary Rosario MD          Follow-up Information    None          Rosemary Rosario MD  10/20/24 5727

## 2024-10-20 NOTE — CONSULTS
"[facility]  Psychiatry  Telepsychiatry Consult Note    Patient agreeable to consultation via telepsychiatry.    Tele-Consultation from Psychiatry started: 10/20/2024 at 4:24P  The chief complaint leading to psychiatric consultation is: SI  This consultation was requested by the Emergency Department attending physician.  The location of the consulting psychiatrist is [place], [place]  The patient location is  [facility] EMERGENCY DEPARTMENT   The patient arrived at the ED at: approx   Also present with the patient at the time of the consultation: nurse    Patient Identification:   Linda Sullivan is a 49 y.o. female.    Patient information was obtained from patient.  Patient presented voluntarily to the Emergency Department by private vehicle.    Consults  Teleconsult Time Documentation  Subjective:   Per ED summary "49-year-old female presents complaining of feeling that she needs psychiatric evaluation treatment and possible admission. The patient reports she has had escalating depression and escalating feeling of anxiety. The patient states that she feels she is on the verge of or has had a nervous breakdown. She states she is crying constantly has had recent bad news and is overwhelmed completely with her current life events. She has been having suicidal thoughts intermittently. She denies any homicidal ideations, auditory or visual hallucinations and denies any delusions. She denies any acute medical issues problems or complaints otherwise."    On interview with the patient,  "My brother brought me. I don't know really what is going. I guess I can say I went to the doctor four days ago and I received bad news. I think possible intake stay. My health has deteriorated. I feel defeated really. I can't get, um, for a long time, my resources to pull myself together."    "I got off my schedule."    "More than depression is the word. I know it's something neurological."    Pt reports using methamphetamine over " "the last four days.    Reports severe exhaustation. Says she sleeps too much.    No eye contact.     "I quit taking all of my medicine four or five days ago." Topomax, diabetes medicine, buspar - pt with thought-blocking and tangentiality of TP. Redirectable but very hard to follow.    Psych Review of Symptoms: items highlighted should be considered positive    Depression/Mood: depression, changes in sleep, anhedonia, energy, appetite, concentration, psychomotor retardation, SI, HI.  euphoria, increased energy, grandiosity, decreased sleep, hyper-religiousity, impulsivity, distractibility, pleasure seeking.    Anxiety: panic attacks, ruminative thoughts, obsession/compulsions    Trauma: nightmares, flashbacks, avoidance, intrusive symptoms, dissociation/derealization, disordered eating, self-injurious behavior    Cognition: memory troubles, executive function concerns, "brain fog"    Psychosis: hallucinations, delusions, paranoia, persecutory thoughts, difficulty with reality testing    Suicide Screening Tool:  In the past week, have you wished you were dead? y  In the past week, have you felt that you or your family would be better off if you were dead? y  In the past week, have you been having thoughts about killing yourself? y  Have you ever tried to kill yourself? y  Are you having thoughts of killing yourself right now? Denies     Biological considerations: no known hx hypothyroidism, b12 deficiency, syphillis, autoimmune disorders, strokes. Pt reports seizure disorder, hx of behcet's, fibromyalgia and migraines    Psychiatric History:   Previous Psychiatric Hospitalizations: yes, last was 2021 at [facility]  Previous Medication Trials: abilify, fioricet, Gabapentin, seroquel  Effexor wellbutrin Flexeril Klonopin cymbalta Vistaril  Paxil, topamax  Previous Suicide Attempts: yes  History of Violence: none  History of Depression: yes  History of Mojgan: none  History of Auditory/Visual Hallucination: yes  History " "of Delusions: no  Outpatient psychiatrist (current & past): goes to provider at [facility]    Substance Abuse History:  Tobacco: yes  Alcohol: last heavy use in february  Illicit Substances: "I've been as clean as I can be" - used methamphetamine several days ago by snorting, last time before that was 2010  Detox/Rehab: none    Legal History: Past charges/incarcerations: not discussed    Family Psychiatric History:   yes    Social History:  Developmental/Childhood: grew up in [place]  *Education: completed high school, [employer], did a few years of [employer]  Employment Status/Finances: last job was 10 years ago, currently living on disability  Relationship Status/Sexual Orientation: single,   Children: two daughters and a son  Housing Status: had a roommate   history: none  Access to gun: no  Yazidism: not discussed  Recreation/Hobbies: drug use    Psychiatric Mental Status Exam:  Arousal: alert  Sensorium/Orientation: oriented to grossly intact  Behavior/Cooperation: cooperative   Speech: normal rate, pressured, soft  Language: grossly intact  Mood: " I'm beyond depressed "   Affect: depressed  Thought Process: loose associations, tangential  Thought Content: help-seeking, hopeless, feels like a failure, interested in rehab  Auditory hallucinations: none  Visual hallucinations: none  Paranoia: none  Delusions:  none  Suicidal ideation: yes, ambivalent regarding plan   Homicidal ideation: none  Attention/Concentration:  intact  Memory:    Recent:  Intact   Remote: Intact  Fund of Knowledge:  average  Abstract reasoning: not assessed  Insight: has awareness of illness  Judgment: limited      Past Medical History:   Past Medical History:   Diagnosis Date    Allergy     Anxiety     Behcet's     Depression     with psychosis    Migraine headache     -chronic; uncontrolled -currently on topamax with PRN triptan -continues to have symptoms - neurology appt pending     Schizophrenia " 02/09/2022    -stay at [facility] 12/17-12/18/21    TIA (transient ischemic attack)     x 2      Laboratory Data:   Labs Reviewed   CBC W/ AUTO DIFFERENTIAL - Abnormal       Result Value    WBC 7.56      RBC 4.77      Hemoglobin 14.9      Hematocrit 42.3      MCV 89      MCH 31.2 (*)     MCHC 35.2      RDW 11.5      Platelets 312      MPV 10.1      Immature Granulocytes 0.1      Gran # (ANC) 5.7      Immature Grans (Abs) 0.01      Lymph # 1.2      Mono # 0.6      Eos # 0.0      Baso # 0.05      nRBC 0      Gran % 75.4 (*)     Lymph % 15.9 (*)     Mono % 7.8      Eosinophil % 0.1      Basophil % 0.7      Differential Method Automated      Narrative:     Release to patient->Immediate   COMPREHENSIVE METABOLIC PANEL - Abnormal    Sodium 138      Potassium 3.1 (*)     Chloride 104      CO2 14 (*)     Glucose 93      BUN 9      Creatinine 0.8      Calcium 10.0      Total Protein 8.5 (*)     Albumin 4.9      Total Bilirubin 1.1 (*)     Alkaline Phosphatase 56      AST 17      ALT 29      eGFR >60      Anion Gap 20 (*)     Narrative:     Release to patient->Immediate   URINALYSIS, REFLEX TO URINE CULTURE - Abnormal    Specimen UA Urine, Clean Catch      Color, UA Colorless (*)     Appearance, UA Clear      pH, UA 6.0      Specific Gravity, UA 1.005      Protein, UA Negative      Glucose, UA Negative      Ketones, UA 2+ (*)     Bilirubin (UA) Negative      Occult Blood UA Negative      Nitrite, UA Negative      Urobilinogen, UA Negative      Leukocytes, UA 2+ (*)     Narrative:     Specimen Source->Urine   DRUG SCREEN PANEL, URINE EMERGENCY - Abnormal    Benzodiazepines Negative      Methadone metabolites Negative      Cocaine (Metab.) Negative      Opiate Scrn, Ur Negative      Barbiturate Screen, Ur Negative      Amphetamine Screen, Ur Presumptive Positive (*)     THC Negative      Phencyclidine Negative      Creatinine, Urine 23.0      Toxicology Information SEE COMMENT      Narrative:     Specimen  Source->Urine   ACETAMINOPHEN LEVEL - Abnormal    Acetaminophen (Tylenol), Serum <3.0 (*)     Narrative:     Release to patient->Immediate   URINALYSIS MICROSCOPIC - Abnormal    RBC, UA 1      WBC, UA 6 (*)     Bacteria Occasional      Yeast, UA None      Squam Epithel, UA 3      Microscopic Comment SEE COMMENT      Narrative:     Specimen Source->Urine   HIV 1 / 2 ANTIBODY    HIV 1/2 Ag/Ab Negative      Narrative:     Release to patient->Immediate   HEPATITIS C ANTIBODY    Hepatitis C Ab Negative      Narrative:     Release to patient->Immediate   TSH    TSH 1.359      Narrative:     Release to patient->Immediate   ALCOHOL,MEDICAL (ETHANOL)    Alcohol, Serum <10      Narrative:     Release to patient->Immediate   POCT URINE PREGNANCY    POC Preg Test, Ur Negative       Acceptable Yes         Neurological History:  Seizures: Yes  Head trauma: Yes    Allergies:   Review of patient's allergies indicates:   Allergen Reactions    Penicillins Hives    Banana Nausea Only    Potato Nausea Only    Tomato (solanum lycopersicum) Nausea Only       Medications in ER:   Medications   nitrofurantoin (macrocrystal-monohydrate) 100 MG capsule 100 mg (has no administration in time range)       Medications at home: currently none    No new subjective & objective note has been filed under this hospital service since the last note was generated.      Assessment - Diagnosis - Goals:     Assessment:  MDD with psychotic features, currently with severe depression and psychotic symptoms  Methamphetamine use disorder with recent relapse  R/o SIMD    Plan:  1. Dispo/Legal Status: Cont PEC at this time as the pt is currently gravely disabled due to an acute psychiatric illness. Seek inpt bed for pt safety and stabilization when/if medically cleared by the ER team.   2. Scheduled Medications: Defer changes to primary inpt team. Defer any non-psych meds to the ER MD.  3. PRN Medications: Haldol and Ativan prn non-redirectable  agitation associated with breakthrough psychosis or angeles if needed to help the pt more effectively interact with her environment.   4. Precautions/Nursin:1 obs  5. To-Do: Continue to observe pt's behavior while in the ER and will reassess the pt daily until placement is found.  And pls obtain baseline EKG, if patient is requiring multiple prn's for bx, monitor for Qtc prolongation       Plan of Care communicated to: ED MD    Time with patient: 21 minutes      More than 50% of the time was spent counseling/coordinating care    Consulting clinician was informed of the encounter and consult note.    Consultation ended: 10/20/2024 at 4:58P    Lexis Oglesby MD   Psychiatry  [facility]

## 2024-10-21 VITALS
DIASTOLIC BLOOD PRESSURE: 63 MMHG | BODY MASS INDEX: 22.22 KG/M2 | HEIGHT: 69 IN | RESPIRATION RATE: 18 BRPM | HEART RATE: 94 BPM | WEIGHT: 150 LBS | TEMPERATURE: 98 F | OXYGEN SATURATION: 99 % | SYSTOLIC BLOOD PRESSURE: 125 MMHG

## 2024-10-21 NOTE — ED NOTES
[facility] called for Linda Sullivan for transport to [facility]; will be here in another 2 hours according to dispatch.

## 2024-12-05 ENCOUNTER — TELEPHONE (OUTPATIENT)
Dept: ENDOCRINOLOGY | Facility: CLINIC | Age: 49
End: 2024-12-05
Payer: MEDICARE

## 2024-12-05 NOTE — TELEPHONE ENCOUNTER
----- Message from Med Assistant Hancock sent at 12/4/2024  3:56 PM CST -----  Regarding: FW: Pharmacy Authorization    ----- Message -----  From: Janny Alvarez  Sent: 12/4/2024   2:00 PM CST  To: [facility] Staff  Subject: Pharmacy Authorization                           Type:  Pharmacy Calling to Clarify an RX    Name of Caller: [facility]    Pharmacy Name:[facility]    Prescription Name:diabetic testing supplies    What do they need to clarify?:verbal authorization    Best Call Back Number:    Additional Information: needs a verbal authorization for diabetic testing supplies.  Please advise. Thank you!

## 2025-01-27 ENCOUNTER — TELEPHONE (OUTPATIENT)
Dept: NEUROSURGERY | Facility: CLINIC | Age: 50
End: 2025-01-27
Payer: MEDICARE

## 2025-01-27 NOTE — TELEPHONE ENCOUNTER
Called patient to inquire about old imaging being placed on a disc. No response, LVM to call back.

## 2025-02-04 ENCOUNTER — TELEPHONE (OUTPATIENT)
Dept: ENDOCRINOLOGY | Facility: CLINIC | Age: 50
End: 2025-02-04
Payer: MEDICARE

## 2025-02-04 NOTE — TELEPHONE ENCOUNTER
----- Message from Nurse Loyola sent at 2/4/2025 11:57 AM CST -----  Regarding: FW: advice    ----- Message -----  From: Raysa Wetzel  Sent: 2/4/2025   9:16 AM CST  To: [facility] Staff  Subject: advice                                           Type:  Needs Medical Advice    Who Called: pt    Best Call Back Number: [phone]      Additional Information: pt wants a callback from a nurse.  please call to discuss.  
Returned call to pt.  Pt had blood work done from a different provider (not in [facility]) and wanted Ms. Benoit to look over lab results to see if her medicine needs changing.  Explained she will have to talk to the provider that ordered the test.  Pt will bring a copy of the lab results to our office.  
(3) no apparent problem

## 2025-02-11 ENCOUNTER — TELEPHONE (OUTPATIENT)
Dept: ENDOCRINOLOGY | Facility: CLINIC | Age: 50
End: 2025-02-11
Payer: MEDICARE

## 2025-02-11 NOTE — TELEPHONE ENCOUNTER
----- Message from Nurse Loyola sent at 2/11/2025 10:13 AM CST -----  Regarding: FW: refill and test results  Contact: pt    ----- Message -----  From: Jess Ly  Sent: 2/11/2025   9:29 AM CST  To: [facility] Staff  Subject: refill and test results                          Type:  Needs Medical Advice    Who Called: pt    Pharmacy name and phone #:    [id number] - [address] & [address] [address]  Phone: [phone] Fax: [phone]    Would the patient rather a call back or a response via [facility]? Call back    Best Call Back Number: [phone]    Additional Information:  pt requesting test results from blood work and also asking if rx for strips was called in.

## 2025-04-07 ENCOUNTER — OFFICE VISIT (OUTPATIENT)
Dept: ENDOCRINOLOGY | Facility: CLINIC | Age: 50
End: 2025-04-07
Payer: MEDICARE

## 2025-04-07 VITALS
DIASTOLIC BLOOD PRESSURE: 80 MMHG | HEIGHT: 68 IN | OXYGEN SATURATION: 98 % | SYSTOLIC BLOOD PRESSURE: 108 MMHG | HEART RATE: 76 BPM | BODY MASS INDEX: 27.03 KG/M2 | TEMPERATURE: 98 F | WEIGHT: 178.38 LBS

## 2025-04-07 DIAGNOSIS — R94.6 ABNORMAL RESULTS OF THYROID FUNCTION STUDIES: ICD-10-CM

## 2025-04-07 DIAGNOSIS — E55.9 HYPOVITAMINOSIS D: ICD-10-CM

## 2025-04-07 DIAGNOSIS — E16.2 HYPOGLYCEMIA: Primary | ICD-10-CM

## 2025-04-07 DIAGNOSIS — R79.89 HIGH SERUM THYROID STIMULATING HORMONE (TSH): ICD-10-CM

## 2025-04-07 DIAGNOSIS — E61.1 IRON DEFICIENCY: ICD-10-CM

## 2025-04-07 DIAGNOSIS — J32.1 FRONTAL SINUSITIS, UNSPECIFIED CHRONICITY: ICD-10-CM

## 2025-04-07 LAB — GLUCOSE SERPL-MCNC: 89 MG/DL (ref 70–110)

## 2025-04-07 PROCEDURE — 82962 GLUCOSE BLOOD TEST: CPT | Mod: S$GLB,,, | Performed by: PHYSICIAN ASSISTANT

## 2025-04-07 PROCEDURE — 99999 PR PBB SHADOW E&M-EST. PATIENT-LVL V: CPT | Mod: PBBFAC,,, | Performed by: PHYSICIAN ASSISTANT

## 2025-04-07 RX ORDER — ACARBOSE 50 MG/1
50 TABLET ORAL
Qty: 270 TABLET | Refills: 3 | Status: SHIPPED | OUTPATIENT
Start: 2025-04-07 | End: 2026-04-07

## 2025-04-07 RX ORDER — GABAPENTIN 600 MG/1
600 TABLET ORAL 3 TIMES DAILY
COMMUNITY
Start: 2024-11-15

## 2025-04-07 RX ORDER — CHOLECALCIFEROL (VITAMIN D3) 25 MCG
TABLET ORAL
COMMUNITY
Start: 2025-03-10

## 2025-04-07 RX ORDER — AZITHROMYCIN 250 MG/1
TABLET, FILM COATED ORAL
Qty: 6 TABLET | Refills: 0 | Status: SHIPPED | OUTPATIENT
Start: 2025-04-07 | End: 2025-04-12

## 2025-04-07 RX ORDER — ACARBOSE 50 MG/1
50 TABLET ORAL
Qty: 270 TABLET | Refills: 3 | Status: SHIPPED | OUTPATIENT
Start: 2025-04-07 | End: 2025-04-07 | Stop reason: SDUPTHER

## 2025-04-07 RX ORDER — DICYCLOMINE HYDROCHLORIDE 10 MG/1
20 CAPSULE ORAL 3 TIMES DAILY
COMMUNITY

## 2025-04-07 NOTE — PROGRESS NOTES
CC: Hypoglycemia    HPI: Linda Sullivan is a 50 y.o. female here for hypoglycemia along with pending conditions listed in the Visit Diagnosis.     Reports episodes of dizziness, nausea, tremors. Dx in HS. She has had one episode of low blood sugar in the past few months.     No recent episodes of low bs.   + polydipsia, polyuria.   She drinks coke zero all day. Reports glucoses in . 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 Reports fatigue after eating. Pt complain of a rash on her hand. She has seen dermatology. Working as a  at [employer] now. Dose of seroquel was recently increased.    Diet:  BF-2 bf sandwiches  LH-fruit  DN-bf sandwich  Drinks coffee, gatorade.     She has three children. Hx of partial hysterectomy.    On 1000 IU of vd daily.  PMHx, PSHx: reviewed in epic.    Social Hx: no ETOH use. Smokes 4 packs weekly since she was 13 yo.    Wt Readings from Last 20 Encounters:   04/07/25 80.9 kg (178 lb 5.6 oz)   10/20/24 68 kg (150 lb)   02/26/24 81.6 kg (180 lb)   02/18/24 75.8 kg (167 lb)   11/09/23 75.8 kg (167 lb 1.7 oz)   10/24/23 79.2 kg (174 lb 9.7 oz)   08/05/23 76.2 kg (168 lb)   05/04/23 81.1 kg (178 lb 10.9 oz)   12/15/22 79.4 kg (175 lb 0.7 oz)   11/04/22 86.4 kg (190 lb 7.6 oz)   10/25/22 82.6 kg (182 lb 1.6 oz)   09/23/22 82.6 kg (182 lb)   09/22/22 82.7 kg (182 lb 6.9 oz)   08/23/22 76.9 kg (169 lb 9.6 oz)   07/25/22 74.6 kg (164 lb 5.7 oz)   06/30/22 72 kg (158 lb 11.7 oz)   06/07/22 71.9 kg (158 lb 8.2 oz)   05/04/22 69.7 kg (153 lb 8.8 oz)   03/28/22 71.4 kg (157 lb 6.5 oz)   12/30/21 62.9 kg (138 lb 11.2 oz)      ROS:   Constitutional: + wt loss 7 lbs  Eyes: No recent visual changes  Cardiovascular: Denies current anginal symptoms  Respiratory: Denies current respiratory difficulty  Gastrointestinal: Denies recent bowel disturbances  GenitoUrinary - No dysuria  Skin: No new skin rash  Neurologic: No focal neurologic complaints  Musculoskeletal: no joint  "pain  Endocrine: no polyphagia, polydipsia or polyuria  Remainder ROS negative     /80 (BP Location: Right arm, Patient Position: Sitting)   Pulse 76   Temp 98.1 °F (36.7 °C) (Oral)   Ht 5' 8" (1.727 m)   Wt 80.9 kg (178 lb 5.6 oz)   SpO2 98%   BMI 27.12 kg/m²      Personally reviewed labs below:  12/24 [facility]  A1c 5.1%  Prolactin 20.2  VD: 24.8  Chol 204  Trig 101  HDL 66    TSH: 4.9  FT4 0.98  TPO 11    Lab Results   Component Value Date    TSH 1.359 10/20/2024    H0WRMOJ 6.2 05/19/2006    FREET4 0.80 10/26/2023          Chemistry        Component Value Date/Time     10/20/2024 1400    K 3.1 (L) 10/20/2024 1400     10/20/2024 1400    CO2 14 (L) 10/20/2024 1400    BUN 9 10/20/2024 1400    CREATININE 0.8 10/20/2024 1400    GLU 93 10/20/2024 1400        Component Value Date/Time    CALCIUM 10.0 10/20/2024 1400    ALKPHOS 56 10/20/2024 1400    AST 17 10/20/2024 1400    ALT 29 10/20/2024 1400    BILITOT 1.1 (H) 10/20/2024 1400    ESTGFRAFRICA >60.0 07/25/2022 1341    EGFRNONAA >60.0 07/25/2022 1341         Lab Results   Component Value Date    HGBA1C 4.9 06/07/2024    HGBA1C 4.8 10/26/2023    HGBA1C 4.7 12/30/2021      PE:  GENERAL: middle aged female, well developed, well nourished  RESPIRATORY: Normal effort, CTAB  NEURO: steady gait, CN ll-Xll grossly intact  SKIN: + raised rash on dorsal side of right hand  PSYCH: normal mood and affect    Assessment/Plan:   1. Hypoglycemia  Fructosamine    Fructosamine    Renal Function Panel    blood sugar diagnostic Strp    acarbose (PRECOSE) 50 MG Tab    POCT Glucose, Hand-Held Device    DISCONTINUED: acarbose (PRECOSE) 50 MG Tab      2. Iron deficiency  Iron and TIBC    Iron and TIBC      3. High serum thyroid stimulating hormone (TSH)  TSH    T4, Free      4. Hypovitaminosis D  Vitamin D      5. Abnormal results of thyroid function studies  TSH    T4, Free    TSH    T4, Free      6. Frontal sinusitis, unspecified chronicity  " azithromycin (Z-AUGUST) 250 MG tablet        Hypoglycemia-resolved. Recheck fructosamine. Continue acarbose.  Iron deficiency-check iron.  Sinusitis-start z-august. F/u w/ PCP.  Abnormal thyroid labs-recheck TFTs  Hypovitaminosis d-increase to 2000 IU of vd daily.    FOLLOWUP  TFTs, iron,   F/u in 6 mths w/ labs prior-fructosamine, tfts, iron, rp, vd

## 2025-04-09 DIAGNOSIS — E16.2 HYPOGLYCEMIA: Primary | ICD-10-CM

## 2025-04-09 RX ORDER — INSULIN PUMP SYRINGE, 3 ML
EACH MISCELLANEOUS
Qty: 1 EACH | Refills: 0 | Status: SHIPPED | OUTPATIENT
Start: 2025-04-09 | End: 2026-04-09

## 2025-04-09 NOTE — TELEPHONE ENCOUNTER
----- Message from Sarita sent at 4/9/2025 11:42 AM CDT -----  Name of Who is Calling:PADMINI COBOS [[id number]]  What is the request in detail:Pt stated her insurance is not covering her meter and test strips. Pt would like the doctor to send her a prescription in that her insurance will cover.Pt would like the doctor to sent it to [facility]  Can the clinic reply by [facility]:Call  What Number to Call Back if not in [facility]: Telephone Information:Mobile          [phone]

## 2025-04-14 ENCOUNTER — LAB VISIT (OUTPATIENT)
Dept: LAB | Facility: HOSPITAL | Age: 50
End: 2025-04-14
Attending: PHYSICIAN ASSISTANT
Payer: MEDICARE

## 2025-04-14 DIAGNOSIS — R94.6 ABNORMAL RESULTS OF THYROID FUNCTION STUDIES: ICD-10-CM

## 2025-04-14 DIAGNOSIS — R79.89 HIGH SERUM THYROID STIMULATING HORMONE (TSH): ICD-10-CM

## 2025-04-14 DIAGNOSIS — E55.9 HYPOVITAMINOSIS D: ICD-10-CM

## 2025-04-14 DIAGNOSIS — E61.1 IRON DEFICIENCY: ICD-10-CM

## 2025-04-14 DIAGNOSIS — E16.2 HYPOGLYCEMIA: ICD-10-CM

## 2025-04-14 LAB
25(OH)D3+25(OH)D2 SERPL-MCNC: 35 NG/ML (ref 30–96)
ALBUMIN SERPL BCP-MCNC: 4 G/DL (ref 3.5–5.2)
ANION GAP (OHS): 11 MMOL/L (ref 8–16)
BUN SERPL-MCNC: 8 MG/DL (ref 6–20)
CALCIUM SERPL-MCNC: 9.1 MG/DL (ref 8.7–10.5)
CHLORIDE SERPL-SCNC: 103 MMOL/L (ref 95–110)
CO2 SERPL-SCNC: 20 MMOL/L (ref 23–29)
CREAT SERPL-MCNC: 0.8 MG/DL (ref 0.5–1.4)
GFR SERPLBLD CREATININE-BSD FMLA CKD-EPI: >60 ML/MIN/1.73/M2
GLUCOSE SERPL-MCNC: 82 MG/DL (ref 70–110)
IRON SATN MFR SERPL: 15 % (ref 20–50)
IRON SERPL-MCNC: 63 UG/DL (ref 30–160)
PHOSPHATE SERPL-MCNC: 3.8 MG/DL (ref 2.7–4.5)
POTASSIUM SERPL-SCNC: 4.4 MMOL/L (ref 3.5–5.1)
SODIUM SERPL-SCNC: 134 MMOL/L (ref 136–145)
T4 FREE SERPL-MCNC: 0.96 NG/DL (ref 0.71–1.51)
TIBC SERPL-MCNC: 428 UG/DL (ref 250–450)
TRANSFERRIN SERPL-MCNC: 289 MG/DL (ref 200–375)
TSH SERPL-ACNC: 1.9 UIU/ML (ref 0.4–4)

## 2025-04-14 PROCEDURE — 80069 RENAL FUNCTION PANEL: CPT

## 2025-04-14 PROCEDURE — 82306 VITAMIN D 25 HYDROXY: CPT

## 2025-04-14 PROCEDURE — 82985 ASSAY OF GLYCATED PROTEIN: CPT

## 2025-04-14 PROCEDURE — 36415 COLL VENOUS BLD VENIPUNCTURE: CPT | Mod: PO

## 2025-04-14 PROCEDURE — 84466 ASSAY OF TRANSFERRIN: CPT

## 2025-04-14 PROCEDURE — 84439 ASSAY OF FREE THYROXINE: CPT

## 2025-04-14 PROCEDURE — 84443 ASSAY THYROID STIM HORMONE: CPT

## 2025-04-15 ENCOUNTER — RESULTS FOLLOW-UP (OUTPATIENT)
Dept: ENDOCRINOLOGY | Facility: CLINIC | Age: 50
End: 2025-04-15

## 2025-04-18 LAB — W FRUCTOSAMINE: 226 UMOL /L

## 2025-04-21 DIAGNOSIS — E16.2 HYPOGLYCEMIA: ICD-10-CM

## 2025-04-21 RX ORDER — ACARBOSE 50 MG/1
50 TABLET ORAL
Qty: 270 TABLET | Refills: 3 | Status: SHIPPED | OUTPATIENT
Start: 2025-04-21 | End: 2026-04-21

## 2025-05-29 ENCOUNTER — TELEPHONE (OUTPATIENT)
Dept: ENDOCRINOLOGY | Facility: CLINIC | Age: 50
End: 2025-05-29
Payer: MEDICARE

## 2025-05-29 NOTE — TELEPHONE ENCOUNTER
Pt c/o elevated BG (235), cloudy urine, blurry vision and general malaise. Her BG is always elevated. She has been taking the acarbose as prescribed. Please advise

## 2025-06-13 ENCOUNTER — TELEPHONE (OUTPATIENT)
Dept: ENDOCRINOLOGY | Facility: CLINIC | Age: 50
End: 2025-06-13
Payer: MEDICARE

## 2025-06-13 DIAGNOSIS — E16.2 HYPOGLYCEMIA: ICD-10-CM

## 2025-06-13 RX ORDER — ACARBOSE 100 MG/1
100 TABLET ORAL
Qty: 270 TABLET | Refills: 3 | Status: SHIPPED | OUTPATIENT
Start: 2025-06-13 | End: 2026-06-13

## 2025-06-13 NOTE — TELEPHONE ENCOUNTER
Copied from CRM #[id number]. Topic: General Inquiry - Patient Advice  >> Jun 13, 2025 11:18 AM Kvng wrote:  Type:  Needs Medical Advice    Who Called: pt     Symptoms (please be specific): pt blood sugar level currently at 263, pt would like to speak with provider      How long has patient had these symptoms:  today 06/13    Pharmacy name and phone #:    [facility] - [place], [place] - [address]  [address]  [facility] [address]  [facility] [address]  Phone: [phone] Fax: [phone]        Would the patient rather a call back or a response via [facility]? Call back     Best Call Back Number: [phone]     Additional Information: please call to advise, Thank You.

## 2025-06-13 NOTE — TELEPHONE ENCOUNTER
Copied from CRM #[id number]. Topic: Medications - Medication Refill  >> Jun 13, 2025  9:41 AM Any wrote:  Type:  RX Refill Request    Who Called: pt   Refill or New Rx:refill  RX Name and Strength:acarbose (PRECOSE) 50 MG Tab  blood sugar diagnostic Strp  How is the patient currently taking it? (ex. 1XDay):as directed   Is this a 30 day or 90 day RX:90   Preferred Pharmacy with phone number  [facility] - [place], [place] - [address]  [address] [address]  Phone: [phone] Fax: [phone]    Local or Mail Order:local   Ordering Provider:maximiliano   Would the patient rather a call back or a response via [facility]? Call back   Best Call Back Number:[phone]   Additional Information:

## 2025-07-11 DIAGNOSIS — Z12.11 SCREEN FOR COLON CANCER: Primary | ICD-10-CM

## 2025-07-21 DIAGNOSIS — Z12.31 ENCOUNTER FOR SCREENING MAMMOGRAM FOR MALIGNANT NEOPLASM OF BREAST: Primary | ICD-10-CM

## 2025-07-25 ENCOUNTER — HOSPITAL ENCOUNTER (OUTPATIENT)
Dept: RADIOLOGY | Facility: HOSPITAL | Age: 50
Discharge: HOME | End: 2025-07-25
Payer: MEDICARE

## 2025-07-25 VITALS — BODY MASS INDEX: 26.98 KG/M2 | HEIGHT: 68 IN | WEIGHT: 178 LBS

## 2025-07-25 DIAGNOSIS — Z12.31 ENCOUNTER FOR SCREENING MAMMOGRAM FOR MALIGNANT NEOPLASM OF BREAST: ICD-10-CM

## 2025-07-25 PROCEDURE — 77063 BREAST TOMOSYNTHESIS BI: CPT | Mod: 26,,, | Performed by: RADIOLOGY

## 2025-07-25 PROCEDURE — 77067 SCR MAMMO BI INCL CAD: CPT | Mod: TC,PO

## 2025-07-25 PROCEDURE — 77067 SCR MAMMO BI INCL CAD: CPT | Mod: 26,,, | Performed by: RADIOLOGY
